# Patient Record
Sex: FEMALE | Race: WHITE | NOT HISPANIC OR LATINO | Employment: OTHER | URBAN - METROPOLITAN AREA
[De-identification: names, ages, dates, MRNs, and addresses within clinical notes are randomized per-mention and may not be internally consistent; named-entity substitution may affect disease eponyms.]

---

## 2023-10-18 ENCOUNTER — OFFICE VISIT (OUTPATIENT)
Dept: INTERNAL MEDICINE | Facility: CLINIC | Age: 68
End: 2023-10-18
Payer: MEDICARE

## 2023-10-18 ENCOUNTER — PATIENT ROUNDING (BHMG ONLY) (OUTPATIENT)
Dept: INTERNAL MEDICINE | Facility: CLINIC | Age: 68
End: 2023-10-18
Payer: MEDICARE

## 2023-10-18 ENCOUNTER — TELEPHONE (OUTPATIENT)
Dept: INTERNAL MEDICINE | Facility: CLINIC | Age: 68
End: 2023-10-18

## 2023-10-18 VITALS
TEMPERATURE: 97.2 F | HEIGHT: 67 IN | WEIGHT: 196 LBS | HEART RATE: 97 BPM | BODY MASS INDEX: 30.76 KG/M2 | SYSTOLIC BLOOD PRESSURE: 139 MMHG | OXYGEN SATURATION: 98 % | DIASTOLIC BLOOD PRESSURE: 81 MMHG

## 2023-10-18 DIAGNOSIS — I25.10 CORONARY ARTERY DISEASE INVOLVING NATIVE CORONARY ARTERY OF NATIVE HEART WITHOUT ANGINA PECTORIS: ICD-10-CM

## 2023-10-18 DIAGNOSIS — Z76.89 ESTABLISHING CARE WITH NEW DOCTOR, ENCOUNTER FOR: Primary | ICD-10-CM

## 2023-10-18 DIAGNOSIS — K08.109 EDENTULOUS: ICD-10-CM

## 2023-10-18 DIAGNOSIS — E78.5 HYPERLIPIDEMIA, UNSPECIFIED HYPERLIPIDEMIA TYPE: ICD-10-CM

## 2023-10-18 DIAGNOSIS — Z13.29 SCREENING FOR THYROID DISORDER: ICD-10-CM

## 2023-10-18 DIAGNOSIS — E66.09 CLASS 1 OBESITY DUE TO EXCESS CALORIES WITHOUT SERIOUS COMORBIDITY WITH BODY MASS INDEX (BMI) OF 30.0 TO 30.9 IN ADULT: ICD-10-CM

## 2023-10-18 DIAGNOSIS — G25.81 RESTLESS LEG: ICD-10-CM

## 2023-10-18 DIAGNOSIS — I43 CARDIOMYOPATHY IN DISEASES CLASSIFIED ELSEWHERE: ICD-10-CM

## 2023-10-18 DIAGNOSIS — I10 ESSENTIAL HYPERTENSION: ICD-10-CM

## 2023-10-18 DIAGNOSIS — R39.15 URINARY URGENCY: ICD-10-CM

## 2023-10-18 DIAGNOSIS — Z23 ENCOUNTER FOR IMMUNIZATION: ICD-10-CM

## 2023-10-18 DIAGNOSIS — I50.22 CHRONIC SYSTOLIC CONGESTIVE HEART FAILURE: ICD-10-CM

## 2023-10-18 DIAGNOSIS — Z11.59 NEED FOR HEPATITIS C SCREENING TEST: ICD-10-CM

## 2023-10-18 DIAGNOSIS — Z86.73 HISTORY OF CVA (CEREBROVASCULAR ACCIDENT): ICD-10-CM

## 2023-10-18 DIAGNOSIS — E11.65 TYPE 2 DIABETES MELLITUS WITH HYPERGLYCEMIA, UNSPECIFIED WHETHER LONG TERM INSULIN USE: ICD-10-CM

## 2023-10-18 DIAGNOSIS — K58.0 IRRITABLE BOWEL SYNDROME WITH DIARRHEA: ICD-10-CM

## 2023-10-18 LAB
ALBUMIN SERPL-MCNC: 4 G/DL (ref 3.5–5.2)
ALBUMIN UR-MCNC: 12.5 MG/DL
ALBUMIN/GLOB SERPL: 1.3 G/DL
ALP SERPL-CCNC: 59 U/L (ref 39–117)
ALT SERPL W P-5'-P-CCNC: 23 U/L (ref 1–33)
ANION GAP SERPL CALCULATED.3IONS-SCNC: 12.3 MMOL/L (ref 5–15)
AST SERPL-CCNC: 29 U/L (ref 1–32)
BILIRUB BLD-MCNC: NEGATIVE MG/DL
BILIRUB SERPL-MCNC: 0.3 MG/DL (ref 0–1.2)
BUN SERPL-MCNC: 29 MG/DL (ref 8–23)
BUN/CREAT SERPL: 18.5 (ref 7–25)
CALCIUM SPEC-SCNC: 9.4 MG/DL (ref 8.6–10.5)
CHLORIDE SERPL-SCNC: 107 MMOL/L (ref 98–107)
CHOLEST SERPL-MCNC: 212 MG/DL (ref 0–200)
CLARITY, POC: CLEAR
CO2 SERPL-SCNC: 18.7 MMOL/L (ref 22–29)
COLOR UR: YELLOW
CREAT SERPL-MCNC: 1.57 MG/DL (ref 0.57–1)
CREAT UR-MCNC: 37.5 MG/DL
EGFRCR SERPLBLD CKD-EPI 2021: 35.3 ML/MIN/1.73
EXPIRATION DATE: NORMAL
GLOBULIN UR ELPH-MCNC: 3 GM/DL
GLUCOSE SERPL-MCNC: 90 MG/DL (ref 65–99)
GLUCOSE UR STRIP-MCNC: NEGATIVE MG/DL
HBA1C MFR BLD: 5.6 % (ref 4.8–5.6)
HCV AB SER DONR QL: REACTIVE
HDLC SERPL-MCNC: 52 MG/DL (ref 40–60)
KETONES UR QL: NEGATIVE
LDLC SERPL CALC-MCNC: 136 MG/DL (ref 0–100)
LDLC/HDLC SERPL: 2.57 {RATIO}
LEUKOCYTE EST, POC: NEGATIVE
Lab: NORMAL
MICROALBUMIN/CREAT UR: 333.3 MG/G (ref 0–29)
NITRITE UR-MCNC: NEGATIVE MG/ML
PH UR: 7 [PH] (ref 5–8)
POTASSIUM SERPL-SCNC: 5.4 MMOL/L (ref 3.5–5.2)
PROT SERPL-MCNC: 7 G/DL (ref 6–8.5)
PROT UR STRIP-MCNC: NEGATIVE MG/DL
RBC # UR STRIP: NEGATIVE /UL
SODIUM SERPL-SCNC: 138 MMOL/L (ref 136–145)
SP GR UR: 1.01 (ref 1–1.03)
TRIGL SERPL-MCNC: 132 MG/DL (ref 0–150)
TSH SERPL DL<=0.05 MIU/L-ACNC: 2.31 UIU/ML (ref 0.27–4.2)
UROBILINOGEN UR QL: NORMAL
VLDLC SERPL-MCNC: 24 MG/DL (ref 5–40)

## 2023-10-18 PROCEDURE — 84443 ASSAY THYROID STIM HORMONE: CPT | Performed by: STUDENT IN AN ORGANIZED HEALTH CARE EDUCATION/TRAINING PROGRAM

## 2023-10-18 PROCEDURE — 83036 HEMOGLOBIN GLYCOSYLATED A1C: CPT | Performed by: STUDENT IN AN ORGANIZED HEALTH CARE EDUCATION/TRAINING PROGRAM

## 2023-10-18 PROCEDURE — 80061 LIPID PANEL: CPT | Performed by: STUDENT IN AN ORGANIZED HEALTH CARE EDUCATION/TRAINING PROGRAM

## 2023-10-18 PROCEDURE — 80053 COMPREHEN METABOLIC PANEL: CPT | Performed by: STUDENT IN AN ORGANIZED HEALTH CARE EDUCATION/TRAINING PROGRAM

## 2023-10-18 PROCEDURE — 82043 UR ALBUMIN QUANTITATIVE: CPT | Performed by: STUDENT IN AN ORGANIZED HEALTH CARE EDUCATION/TRAINING PROGRAM

## 2023-10-18 PROCEDURE — 86803 HEPATITIS C AB TEST: CPT | Performed by: STUDENT IN AN ORGANIZED HEALTH CARE EDUCATION/TRAINING PROGRAM

## 2023-10-18 PROCEDURE — 82570 ASSAY OF URINE CREATININE: CPT | Performed by: STUDENT IN AN ORGANIZED HEALTH CARE EDUCATION/TRAINING PROGRAM

## 2023-10-18 RX ORDER — DICYCLOMINE HYDROCHLORIDE 10 MG/1
10 CAPSULE ORAL
Qty: 120 CAPSULE | Refills: 2 | Status: SHIPPED | OUTPATIENT
Start: 2023-10-18

## 2023-10-18 RX ORDER — ROPINIROLE 1 MG/1
1 TABLET, FILM COATED ORAL 3 TIMES DAILY
Qty: 270 TABLET | Refills: 2 | Status: SHIPPED | OUTPATIENT
Start: 2023-10-18

## 2023-10-18 RX ORDER — ALLOPURINOL 300 MG/1
300 TABLET ORAL DAILY
COMMUNITY

## 2023-10-18 RX ORDER — ACYCLOVIR 200 MG/1
200 CAPSULE ORAL
Status: CANCELLED | OUTPATIENT
Start: 2023-10-18

## 2023-10-18 RX ORDER — ALLOPURINOL 300 MG/1
300 TABLET ORAL DAILY
Status: CANCELLED | OUTPATIENT
Start: 2023-10-18

## 2023-10-18 RX ORDER — ROSUVASTATIN CALCIUM 20 MG/1
20 TABLET, COATED ORAL DAILY
Qty: 90 TABLET | Refills: 2 | Status: SHIPPED | OUTPATIENT
Start: 2023-10-18

## 2023-10-18 RX ORDER — METOPROLOL SUCCINATE 50 MG/1
50 TABLET, EXTENDED RELEASE ORAL DAILY
Qty: 90 TABLET | Refills: 2 | Status: SHIPPED | OUTPATIENT
Start: 2023-10-18

## 2023-10-18 RX ORDER — DAPAGLIFLOZIN 10 MG/1
10 TABLET, FILM COATED ORAL DAILY
Qty: 90 TABLET | Refills: 2 | Status: SHIPPED | OUTPATIENT
Start: 2023-10-18

## 2023-10-18 RX ORDER — TRAZODONE HYDROCHLORIDE 100 MG/1
100 TABLET ORAL NIGHTLY
COMMUNITY

## 2023-10-18 RX ORDER — SPIRONOLACTONE 25 MG/1
25 TABLET ORAL DAILY
Qty: 90 TABLET | Refills: 2 | Status: SHIPPED | OUTPATIENT
Start: 2023-10-18

## 2023-10-18 RX ORDER — ACYCLOVIR 200 MG/1
200 CAPSULE ORAL
COMMUNITY

## 2023-10-18 RX ORDER — FUROSEMIDE 40 MG/1
40 TABLET ORAL DAILY
Qty: 90 TABLET | Refills: 2 | Status: SHIPPED | OUTPATIENT
Start: 2023-10-18 | End: 2024-01-16

## 2023-10-18 RX ORDER — ROPINIROLE 1 MG/1
1 TABLET, FILM COATED ORAL 3 TIMES DAILY
Qty: 270 TABLET | Refills: 2 | Status: SHIPPED | OUTPATIENT
Start: 2023-10-18 | End: 2023-10-18 | Stop reason: SDUPTHER

## 2023-10-18 NOTE — TELEPHONE ENCOUNTER
CALLED SPOKE TO PATIENT, SHE WILL COME IN & SIGN THE RECORDS RELEASE FORMS SO WE CAN FAX THE 4 REQUESTS.

## 2023-10-18 NOTE — LETTER
[facility]  Vaccine Consent Form    Patient Name:  Stefania Figueroa  Patient :  1955     Vaccine(s) Ordered    Pneumococcal Conjugate Vaccine 20-Valent (PCV20)        Screening Checklist  The following questions should be completed prior to vaccination. If you answer “yes” to any question, it does not necessarily mean you should not be vaccinated. It just means we may need to clarify or ask more questions. If a question is unclear, please ask your healthcare provider to explain it.    Yes No   Any fever or moderate to severe illness today (mild illness and/or antibiotic treatment are not contraindications)?     Do you have a history of a serious reaction to any previous vaccinations, such as anaphylaxis, encephalopathy within 7 days, Guillain-Westerlo syndrome within 6 weeks, seizure?     Have you received any live vaccine(s) in the past month (MMR, KWABENA)?     Do you have an anaphylactic allergy to latex (DTaP, DTaP-IPV, Hep A, Hep B, MenB, RV, Td, Tdap), baker’s yeast (Hep B, HPV), or gelatin (KWABENA, MMR)?     Do you have an anaphylactic allergy to neomycin (Rabies, KWABENA, MMR, IPV, Hep A), polymyxin B (IPV), or streptomycin (IPV)?      Any cancer, leukemia, AIDS, or other immune system disorder? (KWABENA, MMR, RV)     Do you have a parent, brother, or sister with an immune system problem (if immune competence of vaccine recipient clinically verified, can proceed)? (MMR, KWABENA)     Any recent steroid treatments for >2 weeks, chemotherapy, or radiation treatment? (KWABENA, MMR)     Have you received antibody-containing blood transfusions or IVIG in the past 11 months (recommended interval is dependent on product)? (MMR, KWABENA)     Have you taken antiviral drugs (acyclovir, famciclovir, valacyclovir) in the last 24 or 48 hours, respectively (KWABENA)?      Are you pregnant or planning to become pregnant within 1 month? (KWABENA, MMR, HPV, IPV, MenB; For hep B- refer to Engerix-B)     For infants, have you ever been told your child has  had intussusception or a medical emergency involving obstruction of the intestine (RV)? If not for an infant, can skip this question.         *Ordering Physician/APC should be consulted if “yes” is checked by the patient or guardian above.      I have received, read, and understand the Vaccine Information Statement (VIS) for each vaccine ordered above.  I have considered my health status as well as the health status of my close contacts.  I have taken the opportunity to discuss my vaccine questions with my health care provider.   I have requested that the ordered vaccine(s) be given to me.  I understand the benefits and risks of the vaccines.  I understand that I should remain in the clinic for 15 minutes after receiving the vaccine(s).  _________________________________________________________  Signature of Patient or Parent/Legal Guardian ____________________  Date

## 2023-10-18 NOTE — PROGRESS NOTES
"Chief Complaint  Med Refill (Pt states she might have a week left here she's trying to go back to [place] ), Irritable Bowel Syndrome (Pt thinks she has ibs has really bad gas ), Weight Gain (Pt concerned of weight gain), and Anxiety (States her meds aren't helping /Scared she might have another heart attack or stroke her bp has been running 170/90 )    Subjective          Stefania Figueroa presents to [facility]  History of Present Illness  Previous PCP: negar brink ([place], [place])  Specialists: Quintin Velazquez (cardiology, [facility]); Michael Van (cardiology, [facility])  Covid Vaccine: 2 doses   Shingrix: no  Pneumonia Vaccine: no  TDAP Vaccine: no  Colonoscopy:yes   Pap Smear: years ago   Mammogram: no     Miss Figueroa is a 69 yo F with a history of HFrEF (EF 25-35%), CAD, previous CVA, T2DM who is here to establish care.  Of note, she has been living in [place] for about 6 months.  Prior to that, she's bounced between three other [facility] ([place], [place]; [place], [place]; and [place], [place]).    Reports having had previous echo's showing an EF low of 25%, with a more recent one at 35%.  She reports a history of two Mis.  She has no defibrillator, although this has been discussed.  She has one INOCENTE, and has not had a CABG.    She, of note, also reports two previous CVA's, with residual issues in her left foot (stating that the \"Left foot doesn't get signals correctly\").    She reports three hospitalizations in the two years for heart failure (one of these was for sepsis as well as heart failure).  These hospitalizations were in [place], [place] (in September 2022, December 2022, and March 2023).  She reports that she briefly follows with an Michael Van of Cardiology in [place].  In [place], [place], followed with a Quintin Velazquez MD.    She has been off her lasix and metoprolol 3 days ago.  She reports that her regular weight is 176 lbs (today, she weighed 196 lbs). "  She reports that her edema generally is in her stomach rather than her legs.  She denies dyspnea, and denies chest pain.    She has previously refused statins because she is concerned that it might affect her memory.  I did encourage her to go onto a statin, and she is amenable to starting.    She reports a long standing history of IBS with diarrhea.  She reports having urinary urgency without dysuria, and would like her urine checked for a UTI.    Elevated BP noted by MA above under chief complaint was taken prior to taking her morning meds (with the exception of metoprolol and lasix, that she is out of).    PHQ-9 Depression Screening  Little interest or pleasure in doing things? 0-->not at all   Feeling down, depressed, or hopeless? 0-->not at all   Trouble falling or staying asleep, or sleeping too much?     Feeling tired or having little energy?     Poor appetite or overeating?     Feeling bad about yourself - or that you are a failure or have let yourself or your family down?     Trouble concentrating on things, such as reading the newspaper or watching television?     Moving or speaking so slowly that other people could have noticed? Or the opposite - being so fidgety or restless that you have been moving around a lot more than usual?     Thoughts that you would be better off dead, or of hurting yourself in some way?     PHQ-9 Total Score 0   If you checked off any problems, how difficult have these problems made it for you to do your work, take care of things at home, or get along with other people?           Current Outpatient Medications   Medication Instructions    acyclovir (ZOVIRAX) 200 mg, Oral, Every 4 Hours While Awake    allopurinol (ZYLOPRIM) 300 mg, Oral, Daily    dicyclomine (BENTYL) 10 mg, Oral, 4 Times Daily Before Meals & Nightly PRN    Farxiga 10 mg, Oral, Daily    furosemide (LASIX) 40 mg, Oral, Daily    metoprolol succinate XL (TOPROL XL) 50 mg, Oral, Daily    rOPINIRole (REQUIP) 1 mg,  "Oral, 3 Times Daily, Take 1 hour before bedtime.    rosuvastatin (CRESTOR) 20 mg, Oral, Daily    spironolactone (ALDACTONE) 25 mg, Oral, Daily    traZODone (DESYREL) 100 mg, Oral, Nightly       The following portions of the patient's history were reviewed and updated as appropriate: allergies, current medications, past family history, past medical history, past social history, past surgical history, and problem list.    Objective   Vital Signs:   /81 (BP Location: Left arm, Patient Position: Sitting, Cuff Size: Large Adult)   Pulse 97   Temp 97.2 °F (36.2 °C) (Temporal)   Ht 170.2 cm (67\")   Wt 88.9 kg (196 lb)   SpO2 98%   BMI 30.70 kg/m²     BP Readings from Last 3 Encounters:   10/18/23 139/81   09/25/23 128/65     Wt Readings from Last 3 Encounters:   10/18/23 88.9 kg (196 lb)   09/25/23 85 kg (187 lb 6.3 oz)     BMI is >= 25 and <30. (Overweight) The following options were offered after discussion;: exercise counseling/recommendations and nutrition counseling/recommendations    Physical Exam  Vitals reviewed.   Constitutional:       General: She is not in acute distress.     Appearance: Normal appearance. She is not ill-appearing, toxic-appearing or diaphoretic.   HENT:      Head: Normocephalic and atraumatic.      Right Ear: Tympanic membrane, ear canal and external ear normal.      Left Ear: Tympanic membrane, ear canal and external ear normal.      Mouth/Throat:      Mouth: Mucous membranes are moist.      Pharynx: Oropharynx is clear.      Comments: Wears dentures  Eyes:      Conjunctiva/sclera: Conjunctivae normal.   Cardiovascular:      Rate and Rhythm: Normal rate and regular rhythm.      Pulses: Normal pulses.      Heart sounds: Normal heart sounds. No murmur heard.     No friction rub. No gallop.   Pulmonary:      Effort: Pulmonary effort is normal. No respiratory distress.      Breath sounds: Normal breath sounds. No stridor. No wheezing, rhonchi or rales.   Chest:      Chest wall: No " tenderness.   Abdominal:      General: Abdomen is flat.      Palpations: Abdomen is soft. There is no mass.      Tenderness: There is no abdominal tenderness. There is no guarding or rebound.   Musculoskeletal:      Right lower leg: No edema.      Left lower leg: No edema.   Skin:     General: Skin is warm and dry.   Neurological:      Mental Status: She is alert. Mental status is at baseline.   Psychiatric:         Behavior: Behavior normal.         Thought Content: Thought content normal.         Judgment: Judgment normal.        Result Review :   The following data was reviewed by: Parker Fontana MD on 10/18/2023:           Lab Results   Component Value Date    BILIRUBINUR Negative 10/18/2023       Procedures   POC UA is negative LE, negative nitrites.  UA unremarkable     Assessment and Plan    Diagnoses and all orders for this visit:    1. Establishing care with new doctor, encounter for (Primary)    2. Essential hypertension  -     metoprolol succinate XL (Toprol XL) 50 MG 24 hr tablet; Take 1 tablet by mouth Daily.  Dispense: 90 tablet; Refill: 2  -     spironolactone (ALDACTONE) 25 MG tablet; Take 1 tablet by mouth Daily.  Dispense: 90 tablet; Refill: 2  -     Ambulatory Referral to Cardiology    3. Restless leg  -     Discontinue: rOPINIRole (REQUIP) 1 MG tablet; Take 1 tablet by mouth 3 (Three) Times a Day for 14 days. Take 1 hour before bedtime.  Dispense: 270 tablet; Refill: 2  -     rOPINIRole (REQUIP) 1 MG tablet; Take 1 tablet by mouth 3 (Three) Times a Day. Take 1 hour before bedtime.  Dispense: 270 tablet; Refill: 2    4. Class 1 obesity due to excess calories without serious comorbidity with body mass index (BMI) of 30.0 to 30.9 in adult    5. Chronic systolic congestive heart failure  -     dapagliflozin Propanediol (Farxiga) 10 MG tablet; Take 10 mg by mouth Daily.  Dispense: 90 tablet; Refill: 2  -     furosemide (LASIX) 40 MG tablet; Take 1 tablet by mouth Daily for 90 days.  Dispense: 90  tablet; Refill: 2  -     metoprolol succinate XL (Toprol XL) 50 MG 24 hr tablet; Take 1 tablet by mouth Daily.  Dispense: 90 tablet; Refill: 2  -     spironolactone (ALDACTONE) 25 MG tablet; Take 1 tablet by mouth Daily.  Dispense: 90 tablet; Refill: 2  -     Ambulatory Referral to Cardiology  -     Adult Transthoracic Echo Complete W/ Cont if Necessary Per Protocol; Future  -     Comprehensive Metabolic Panel  -     Hemoglobin A1c    6. History of CVA (cerebrovascular accident)  -     Lipid Panel  -     rosuvastatin (Crestor) 20 MG tablet; Take 1 tablet by mouth Daily.  Dispense: 90 tablet; Refill: 2    7. Type 2 diabetes mellitus with hyperglycemia, unspecified whether long term insulin use  -     dapagliflozin Propanediol (Farxiga) 10 MG tablet; Take 10 mg by mouth Daily.  Dispense: 90 tablet; Refill: 2  -     Comprehensive Metabolic Panel  -     Hemoglobin A1c  -     Microalbumin / Creatinine Urine Ratio - Urine, Clean Catch    8. Screening for thyroid disorder  -     TSH    9. Hyperlipidemia, unspecified hyperlipidemia type  -     Lipid Panel  -     rosuvastatin (Crestor) 20 MG tablet; Take 1 tablet by mouth Daily.  Dispense: 90 tablet; Refill: 2    10. Need for hepatitis C screening test  -     Hepatitis C Antibody    11. Encounter for immunization  -     Pneumococcal Conjugate Vaccine 20-Valent (PCV20)    12. Cardiomyopathy in diseases classified elsewhere  -     Adult Transthoracic Echo Complete W/ Cont if Necessary Per Protocol; Future    13. Edentulous    14. Irritable bowel syndrome with diarrhea  -     dicyclomine (BENTYL) 10 MG capsule; Take 1 capsule by mouth 4 (Four) Times a Day Before Meals & at Bedtime As Needed for Abdominal Cramping.  Dispense: 120 capsule; Refill: 2    15. Coronary artery disease involving native coronary artery of native heart without angina pectoris  -     rosuvastatin (Crestor) 20 MG tablet; Take 1 tablet by mouth Daily.  Dispense: 90 tablet; Refill: 2    16. Urinary  urgency  -     POC Urinalysis Dipstick, Automated  -     Urine Culture - Urine, Urine, Clean Catch  -     Urinalysis With Microscopic - Urine, Clean Catch      CHF:  -have ordered repeat Echo and placed cardiology referral    Misc:  -I did  Miss Figueroa that I am quite concerned that her frequent travel between states is resulting in her being lost to care  -I did speak with her at length that when she moves, she really should do so in as organized a manner as possible.  This would include efforts to find and establish with a new provider as soon as possible (Miss Figueroa reports having been living here for 6 months, and only established when she was in danger of running out of chronic medications).    Immunization:  -Discussed risks/benefits to vaccination, reviewed components of the vaccine, discussed VIS, discussed informed consent, informed consent obtained. Patient/Parent was allowed to accept or refuse vaccine. Questions answered to satisfactory state of patient/parent. We reviewed typical age appropriate and seasonally appropriate vaccinations. Reviewed immunization history and updated state vaccination form as needed. Patient/Parent was counseled on the above vaccines.        Medications Discontinued During This Encounter   Medication Reason    dapagliflozin Propanediol (Farxiga) 10 MG tablet Reorder    metoprolol succinate XL (Toprol XL) 50 MG 24 hr tablet Reorder    spironolactone (ALDACTONE) 25 MG tablet Reorder    furosemide (LASIX) 40 MG tablet Reorder    rOPINIRole (REQUIP) 1 MG tablet Reorder    rOPINIRole (REQUIP) 1 MG tablet Reorder          Follow Up   Return in about 1 month (around 11/18/2023) for Medicare Wellness.  Patient was given instructions and counseling regarding her condition or for health maintenance advice. Please see specific information pulled into the AVS if appropriate.       Parker Fontana MD  10/18/23  12:12 EDT

## 2023-10-18 NOTE — Clinical Note
Please records:  [facility], in [place] Michael Van of cardiology, [facility] Kurt Gonzales, previous PCP in [place] Quintin Velazquez, Cardiology, [facility]

## 2023-10-19 ENCOUNTER — LAB (OUTPATIENT)
Dept: LAB | Facility: HOSPITAL | Age: 68
End: 2023-10-19
Payer: MEDICARE

## 2023-10-19 DIAGNOSIS — R76.8 POSITIVE HEPATITIS C ANTIBODY TEST: Primary | ICD-10-CM

## 2023-10-19 DIAGNOSIS — B19.20 HEPATITIS C VIRUS INFECTION WITHOUT HEPATIC COMA, UNSPECIFIED CHRONICITY: ICD-10-CM

## 2023-10-19 DIAGNOSIS — N18.32 STAGE 3B CHRONIC KIDNEY DISEASE: ICD-10-CM

## 2023-10-19 DIAGNOSIS — R76.8 POSITIVE HEPATITIS C ANTIBODY TEST: ICD-10-CM

## 2023-10-19 PROBLEM — Z53.20 REFUSAL OF STATIN MEDICATION BY PATIENT: Status: ACTIVE | Noted: 2023-10-19

## 2023-10-19 LAB
ALBUMIN SERPL-MCNC: 4.2 G/DL (ref 3.5–5.2)
ALBUMIN/GLOB SERPL: 1.4 G/DL
ALP SERPL-CCNC: 61 U/L (ref 39–117)
ALT SERPL W P-5'-P-CCNC: 20 U/L (ref 1–33)
ANION GAP SERPL CALCULATED.3IONS-SCNC: 8.7 MMOL/L (ref 5–15)
AST SERPL-CCNC: 24 U/L (ref 1–32)
BILIRUB SERPL-MCNC: 0.3 MG/DL (ref 0–1.2)
BUN SERPL-MCNC: 28 MG/DL (ref 8–23)
BUN/CREAT SERPL: 16.1 (ref 7–25)
CALCIUM SPEC-SCNC: 9.5 MG/DL (ref 8.6–10.5)
CHLORIDE SERPL-SCNC: 106 MMOL/L (ref 98–107)
CO2 SERPL-SCNC: 23.3 MMOL/L (ref 22–29)
CREAT SERPL-MCNC: 1.74 MG/DL (ref 0.57–1)
EGFRCR SERPLBLD CKD-EPI 2021: 31.2 ML/MIN/1.73
GLOBULIN UR ELPH-MCNC: 2.9 GM/DL
GLUCOSE SERPL-MCNC: 83 MG/DL (ref 65–99)
INR PPP: 0.92 (ref 0.86–1.15)
POTASSIUM SERPL-SCNC: 5.3 MMOL/L (ref 3.5–5.2)
PROT SERPL-MCNC: 7.1 G/DL (ref 6–8.5)
PROTHROMBIN TIME: 12.5 SECONDS (ref 11.8–14.9)
SODIUM SERPL-SCNC: 138 MMOL/L (ref 136–145)

## 2023-10-19 PROCEDURE — 87517 HEPATITIS B DNA QUANT: CPT

## 2023-10-19 PROCEDURE — 85610 PROTHROMBIN TIME: CPT

## 2023-10-19 PROCEDURE — 87522 HEPATITIS C REVRS TRNSCRPJ: CPT

## 2023-10-19 PROCEDURE — 36415 COLL VENOUS BLD VENIPUNCTURE: CPT

## 2023-10-19 PROCEDURE — 80053 COMPREHEN METABOLIC PANEL: CPT

## 2023-10-21 LAB
HBV DNA SERPL NAA+PROBE-ACNC: NORMAL IU/ML
HBV DNA SERPL NAA+PROBE-LOG IU: NORMAL LOG10 IU/ML
HBV GENTYP SERPL NAA+PROBE: NORMAL
REF LAB TEST REF RANGE: NORMAL

## 2023-10-23 LAB
HCV RNA SERPL NAA+PROBE-ACNC: NORMAL IU/ML
TEST INFORMATION: NORMAL

## 2023-10-26 ENCOUNTER — TELEPHONE (OUTPATIENT)
Dept: INTERNAL MEDICINE | Facility: CLINIC | Age: 68
End: 2023-10-26
Payer: MEDICARE

## 2023-10-26 NOTE — TELEPHONE ENCOUNTER
Name: DUKE Stefania    Relationship: Self    Best Callback Number: [phone]    HUB PROVIDED THE RELAY MESSAGE FROM THE OFFICE   PATIENT VOICED UNDERSTANDING AND HAS NO FURTHER QUESTIONS AT THIS TIME    ADDITIONAL INFORMATION:

## 2023-10-26 NOTE — TELEPHONE ENCOUNTER
Attempted to contact patient regarding lab results. Left Voicemail to call our office back.     HUB OK TO READ/ ADVISE:       ----- Message from Parker Fontana MD sent at 10/26/2023  8:18 AM EDT -----  PCR testing for Hepatitis C is negative.  There is no evidence of hepatitis C infection.

## 2023-10-26 NOTE — TELEPHONE ENCOUNTER
----- Message from Parker Fontana MD sent at 10/26/2023  8:18 AM EDT -----  PCR testing for Hepatitis C is negative.  There is no evidence of hepatitis C infection.

## 2023-10-30 ENCOUNTER — TELEPHONE (OUTPATIENT)
Dept: INTERNAL MEDICINE | Facility: CLINIC | Age: 68
End: 2023-10-30
Payer: MEDICARE

## 2023-10-30 DIAGNOSIS — I10 ESSENTIAL HYPERTENSION: ICD-10-CM

## 2023-10-30 DIAGNOSIS — I50.22 CHRONIC SYSTOLIC CONGESTIVE HEART FAILURE: ICD-10-CM

## 2023-10-30 DIAGNOSIS — G25.81 RESTLESS LEG: ICD-10-CM

## 2023-10-30 DIAGNOSIS — E78.5 HYPERLIPIDEMIA, UNSPECIFIED HYPERLIPIDEMIA TYPE: ICD-10-CM

## 2023-10-30 DIAGNOSIS — Z86.73 HISTORY OF CVA (CEREBROVASCULAR ACCIDENT): ICD-10-CM

## 2023-10-30 DIAGNOSIS — K58.0 IRRITABLE BOWEL SYNDROME WITH DIARRHEA: ICD-10-CM

## 2023-10-30 DIAGNOSIS — E11.65 TYPE 2 DIABETES MELLITUS WITH HYPERGLYCEMIA, UNSPECIFIED WHETHER LONG TERM INSULIN USE: ICD-10-CM

## 2023-10-30 DIAGNOSIS — I25.10 CORONARY ARTERY DISEASE INVOLVING NATIVE CORONARY ARTERY OF NATIVE HEART WITHOUT ANGINA PECTORIS: ICD-10-CM

## 2023-10-30 NOTE — TELEPHONE ENCOUNTER
Caller: Stefania DUKE    Relationship to patient: Self    Best call back number: [phone]    Patient is needing: PATIENT CALLED STATING ALL HER MEDICATION THAT WAS SENT ON 10.18.23 WAS SENT TO THE WRONG PHARMACY AND PATIENT IS REQUESTING THE MEDICATION BE RESUBMITTED TO [facility] IN [place] INSTEAD. PLEASE CALL PATIENT ONCE COMPLETED.

## 2023-11-01 RX ORDER — SPIRONOLACTONE 25 MG/1
25 TABLET ORAL DAILY
Qty: 90 TABLET | Refills: 2 | Status: SHIPPED | OUTPATIENT
Start: 2023-11-01

## 2023-11-01 RX ORDER — DAPAGLIFLOZIN 10 MG/1
10 TABLET, FILM COATED ORAL DAILY
Qty: 90 TABLET | Refills: 2 | Status: SHIPPED | OUTPATIENT
Start: 2023-11-01 | End: 2023-11-01 | Stop reason: SDUPTHER

## 2023-11-01 RX ORDER — ROPINIROLE 1 MG/1
1 TABLET, FILM COATED ORAL 3 TIMES DAILY
Qty: 270 TABLET | Refills: 2 | Status: SHIPPED | OUTPATIENT
Start: 2023-11-01

## 2023-11-01 RX ORDER — FUROSEMIDE 40 MG/1
40 TABLET ORAL DAILY
Qty: 30 TABLET | Refills: 2 | Status: SHIPPED | OUTPATIENT
Start: 2023-11-01 | End: 2023-11-01 | Stop reason: SDUPTHER

## 2023-11-01 RX ORDER — DAPAGLIFLOZIN 10 MG/1
10 TABLET, FILM COATED ORAL DAILY
Qty: 90 TABLET | Refills: 2 | Status: SHIPPED | OUTPATIENT
Start: 2023-11-01

## 2023-11-01 RX ORDER — FUROSEMIDE 40 MG/1
40 TABLET ORAL DAILY
Qty: 30 TABLET | Refills: 2 | Status: SHIPPED | OUTPATIENT
Start: 2023-11-01 | End: 2024-01-30

## 2023-11-01 RX ORDER — ACYCLOVIR 200 MG/1
200 CAPSULE ORAL
Status: CANCELLED | OUTPATIENT
Start: 2023-11-01

## 2023-11-01 RX ORDER — ROPINIROLE 1 MG/1
1 TABLET, FILM COATED ORAL 3 TIMES DAILY
Qty: 270 TABLET | Refills: 2 | Status: SHIPPED | OUTPATIENT
Start: 2023-11-01 | End: 2023-11-01 | Stop reason: SDUPTHER

## 2023-11-01 RX ORDER — ROSUVASTATIN CALCIUM 20 MG/1
20 TABLET, COATED ORAL DAILY
Qty: 90 TABLET | Refills: 2 | Status: SHIPPED | OUTPATIENT
Start: 2023-11-01

## 2023-11-01 RX ORDER — ROSUVASTATIN CALCIUM 20 MG/1
20 TABLET, COATED ORAL DAILY
Qty: 90 TABLET | Refills: 2 | Status: SHIPPED | OUTPATIENT
Start: 2023-11-01 | End: 2023-11-01 | Stop reason: SDUPTHER

## 2023-11-01 RX ORDER — DICYCLOMINE HYDROCHLORIDE 10 MG/1
10 CAPSULE ORAL
Qty: 120 CAPSULE | Refills: 2 | Status: SHIPPED | OUTPATIENT
Start: 2023-11-01 | End: 2023-11-01 | Stop reason: SDUPTHER

## 2023-11-01 RX ORDER — METOPROLOL SUCCINATE 50 MG/1
50 TABLET, EXTENDED RELEASE ORAL DAILY
Qty: 90 TABLET | Refills: 2 | Status: SHIPPED | OUTPATIENT
Start: 2023-11-01

## 2023-11-01 RX ORDER — DICYCLOMINE HYDROCHLORIDE 10 MG/1
10 CAPSULE ORAL
Qty: 120 CAPSULE | Refills: 2 | Status: SHIPPED | OUTPATIENT
Start: 2023-11-01

## 2023-11-01 RX ORDER — METOPROLOL SUCCINATE 50 MG/1
50 TABLET, EXTENDED RELEASE ORAL DAILY
Qty: 90 TABLET | Refills: 2 | Status: SHIPPED | OUTPATIENT
Start: 2023-11-01 | End: 2023-11-01 | Stop reason: SDUPTHER

## 2023-11-01 RX ORDER — SPIRONOLACTONE 25 MG/1
25 TABLET ORAL DAILY
Qty: 90 TABLET | Refills: 2 | Status: SHIPPED | OUTPATIENT
Start: 2023-11-01 | End: 2023-11-01 | Stop reason: SDUPTHER

## 2023-11-01 NOTE — TELEPHONE ENCOUNTER
Caller: Stefania DUKE    Relationship: Self    Best call back number: [phone]     Requested Prescriptions:   Requested Prescriptions     Pending Prescriptions Disp Refills    allopurinol (ZYLOPRIM) 300 MG tablet       Sig: Take 1 tablet by mouth Daily.    acyclovir (ZOVIRAX) 200 MG capsule       Sig: Take 1 capsule by mouth Every 4 (Four) Hours While Awake.        Pharmacy where request should be sent: [facility]/PHARMACY #[id number] - [place], [place] - [address] [facility] [phone] [facility] [phone] FX     Last office visit with prescribing clinician: 10/18/2023   Last telemedicine visit with prescribing clinician: Visit date not found   Next office visit with prescribing clinician: 11/22/2023     Additional details provided by patient: PATIENT IS NEEDING A REFILL.    Does the patient have less than a 3 day supply:  [x] Yes  [] No    Would you like a call back once the refill request has been completed: [x] Yes [] No    If the office needs to give you a call back, can they leave a voicemail: [x] Yes [] No    Natanael Kong Rep   11/01/23 16:09 EDT

## 2023-11-02 RX ORDER — ALLOPURINOL 300 MG/1
300 TABLET ORAL DAILY
Qty: 90 TABLET | Refills: 2 | Status: SHIPPED | OUTPATIENT
Start: 2023-11-02

## 2023-11-02 NOTE — TELEPHONE ENCOUNTER
I don't believe we discussed either issue in clinic, and I don't have her other provider's notes for review.  I'll continue the allopurinol, but I'll hold off on refilling acyclovir for now.

## 2023-11-03 RX ORDER — ACYCLOVIR 200 MG/1
200 CAPSULE ORAL
OUTPATIENT
Start: 2023-11-03

## 2023-11-03 NOTE — TELEPHONE ENCOUNTER
Caller: Stefania DUKE    Relationship: Self    Best call back number: [phone]     Requested Prescriptions:   Requested Prescriptions     Pending Prescriptions Disp Refills    acyclovir (ZOVIRAX) 200 MG capsule       Sig: Take 1 capsule by mouth Every 4 (Four) Hours While Awake.      [facility]     Pharmacy where request should be sent: [facility]/PHARMACY #[id number] - [place], [place] - [address] [facility] [phone]  - [phone] FX     Last office visit with prescribing clinician: 10/18/2023   Last telemedicine visit with prescribing clinician: Visit date not found   Next office visit with prescribing clinician: 11/22/2023     Does the patient have less than a 3 day supply:  [x] Yes  [] No    Would you like a call back once the refill request has been completed: [x] Yes [] No    If the office needs to give you a call back, can they leave a voicemail: [x] Yes [] No    Natanael Riggs Rep   11/03/23 10:12 EDT

## 2023-11-03 NOTE — TELEPHONE ENCOUNTER
"This is a repeat request.    \"  I don't believe we discussed either issue in clinic, and I don't have her other provider's notes for review.  I'll continue the allopurinol, but I'll hold off on refilling acyclovir for now.      \"  "

## 2023-11-03 NOTE — TELEPHONE ENCOUNTER
It does not appear that the Acyclovir or the Myrbetriq has been sent in by you prior. Are these medications okay to send?

## 2023-11-06 ENCOUNTER — TELEPHONE (OUTPATIENT)
Dept: INTERNAL MEDICINE | Facility: CLINIC | Age: 68
End: 2023-11-06
Payer: MEDICARE

## 2023-11-06 DIAGNOSIS — N32.81 OVERACTIVE BLADDER: Primary | ICD-10-CM

## 2023-11-06 NOTE — PROGRESS NOTES
".\"A [employer] message has been sent to the patient for PATIENT ROUNDING with [facility], [place]\"  "

## 2023-11-06 NOTE — TELEPHONE ENCOUNTER
Patient called in upset and stated she did not understand why her Acyclovir was denied. I explained your note to patients prior refill request and advised she had not discussed this with you in re guard to needing it refilled. Patient stated she is having a viral breakout. I asked the patient if she would like me to schedule her an appointment to come in and talk you about it and she stated that she did not have a car. Asking if we can send it in or do a video visit.

## 2023-11-09 RX ORDER — ACYCLOVIR 400 MG/1
400 TABLET ORAL 3 TIMES DAILY
Qty: 30 TABLET | Refills: 0 | Status: SHIPPED | OUTPATIENT
Start: 2023-11-09 | End: 2023-11-19

## 2023-11-09 NOTE — TELEPHONE ENCOUNTER
Patient informed that medication has been sent in. Patient asked if the Myrbetriq had been sent in, informed patient that this was not on active med list. Patient states that she takes this due to having her issues with bladder due to stroke. She has not taken this for a while but is stating that she needs this.    Patient requests that [facility] be sent to [facility].

## 2023-11-16 ENCOUNTER — OFFICE VISIT (OUTPATIENT)
Dept: CARDIOLOGY | Facility: CLINIC | Age: 68
End: 2023-11-16
Payer: MEDICARE

## 2023-11-16 VITALS
BODY MASS INDEX: 30.29 KG/M2 | DIASTOLIC BLOOD PRESSURE: 70 MMHG | HEART RATE: 76 BPM | SYSTOLIC BLOOD PRESSURE: 142 MMHG | HEIGHT: 67 IN | WEIGHT: 193 LBS

## 2023-11-16 DIAGNOSIS — I50.22 SYSTOLIC CHF, CHRONIC: Primary | ICD-10-CM

## 2023-11-16 DIAGNOSIS — E78.2 HYPERLIPEMIA, MIXED: ICD-10-CM

## 2023-11-16 DIAGNOSIS — I42.0 NONISCHEMIC DILATED CARDIOMYOPATHY: ICD-10-CM

## 2023-11-16 PROCEDURE — 99204 OFFICE O/P NEW MOD 45 MIN: CPT | Performed by: SPECIALIST

## 2023-11-16 PROCEDURE — 93000 ELECTROCARDIOGRAM COMPLETE: CPT | Performed by: SPECIALIST

## 2023-11-16 PROCEDURE — 3077F SYST BP >= 140 MM HG: CPT | Performed by: SPECIALIST

## 2023-11-16 PROCEDURE — 3078F DIAST BP <80 MM HG: CPT | Performed by: SPECIALIST

## 2023-11-16 RX ORDER — ASPIRIN 81 MG/1
81 TABLET ORAL DAILY
Qty: 90 TABLET | Refills: 5 | Status: SHIPPED | OUTPATIENT
Start: 2023-11-16

## 2023-11-16 NOTE — PROGRESS NOTES
[facility]   Cardiology Consult Note    Patient Name: Stefania DUKE  : 1955  Referring Physician: Parker Fontana MD  Subjective   Subjective     Reason for Consult/ Chief Complaint:   Chief Complaint   Patient presents with    Hypertension    Congestive Heart Failure       HPI:  Stefania DUKE is a 68 y.o. female with history of cardiomyopathy and CHF.  Has moved in here from [place].  Wants to establish care.  No chest pain.  No shortness of breath.    Review of Systems:    Constitutional no fever,  no weight loss   Skin no rash   Otolaryngeal no difficulty swallowing   Cardiovascular See HPI   Pulmonary no cough, no sputum production   Gastrointestinal no constipation, no diarrhea   Genitourinary no dysuria, no hematuria   Hematologic no easy bruisability, no abnormal bleeding   Musculoskeletal no muscle pain   Neurologic no dizziness, no falls       Personal History     Past Medical History:  Past Medical History:   Diagnosis Date    CHF (congestive heart failure)     Diabetes mellitus     Fluid retention     Gout     Hypertension     Stroke     Tachycardia        Family History: History reviewed. No pertinent family history.    Social History:  reports that she quit smoking about 31 years ago. Her smoking use included cigarettes. She has never used smokeless tobacco. She reports that she does not drink alcohol and does not use drugs.    Home Medications:  Mirabegron ER, acyclovir, allopurinol, dapagliflozin Propanediol, dicyclomine, furosemide, metoprolol succinate XL, rOPINIRole, rosuvastatin, spironolactone, and traZODone    Allergies:  Allergies   Allergen Reactions    Entresto [Sacubitril-Valsartan] Unknown - Low Severity       Objective    Objective     Vitals:   Heart Rate:  [76] 76  BP: (142)/(70) 142/70  Body mass index is 30.23 kg/m².  PHYSICAL EXAM:    General Appearance:   well developed  well nourished  HENT:   oropharynx moist  lips not cyanotic  Neck:  thyroid not  enlarged  supple  Respiratory:  no respiratory distress  normal breath sounds  no rales  Cardiovascular:  no jugular venous distention  regular rhythm  apical impulse normal  S1 normal, S2 normal  no S3, no S4   no murmur  no rub, no thrill  carotid pulses normal; no bruit  pedal pulses normal  lower extremity edema: none    Skin:   warm, dry  Psychiatric:  judgement and insight appropriate  normal mood and affect    RESULTS:           Result Review    Result Review:  I have personally reviewed the available results:  [x]  Laboratory  [x]  EKG/Telemetry   [x]  Cardiology/Vascular   [x] Medications  [x]  Old records  Lab Results   Component Value Date    CHOL 212 (H) 10/18/2023     Lab Results   Component Value Date    TRIG 132 10/18/2023     Lab Results   Component Value Date    HDL 52 10/18/2023     Lab Results   Component Value Date     (H) 10/18/2023     Lab Results   Component Value Date    VLDL 24 10/18/2023           ECG 12 Lead    Date/Time: 11/16/2023 1:00 PM  Performed by: Paco Cortez MD    Authorized by: Paco Cortez MD  Rhythm: sinus rhythm  Rate: normal  Conduction: left bundle branch block  QRS axis: normal  Other findings: non-specific ST-T wave changes    Clinical impression: abnormal EKG           Impression/Plan  1.  Nonischemic cardiomyopathy: Continue Toprol-XL 50 mg once a day.  Could not tolerate Entresto before.  Repeat echocardiogram.  2.  Chronic systolic heart failure stable: Continue Farxiga 10 mg once a day.  Continue Aldactone 25 mg once a day.  Monitor BMP.  Continue Lasix 40 mg once a day.  3.  Hyperlipidemia: Continue Crestor 20 mg once a day.  Monitor lipid and hepatic profile.  4.  Stable CORONARY ARTERY DISEASE: Continue metoprolol 50 mg once a day.  Add aspirin 81 mg once a day.        Electronically signed by Paco Cortez MD, 11/16/23, 12:47 PM EST.

## 2023-12-08 ENCOUNTER — TELEPHONE (OUTPATIENT)
Dept: INTERNAL MEDICINE | Facility: CLINIC | Age: 68
End: 2023-12-08

## 2023-12-08 DIAGNOSIS — I50.22 CHRONIC SYSTOLIC CONGESTIVE HEART FAILURE: ICD-10-CM

## 2023-12-08 RX ORDER — FUROSEMIDE 40 MG/1
40 TABLET ORAL DAILY
Qty: 30 TABLET | Refills: 0 | Status: SHIPPED | OUTPATIENT
Start: 2023-12-08 | End: 2024-03-07

## 2023-12-08 RX ORDER — TRAZODONE HYDROCHLORIDE 100 MG/1
100 TABLET ORAL NIGHTLY
Status: CANCELLED | OUTPATIENT
Start: 2023-12-08

## 2023-12-08 NOTE — TELEPHONE ENCOUNTER
Caller: Stefania Molina    Relationship: Self    Best call back number: [phone]     Requested Prescriptions:   Requested Prescriptions     Pending Prescriptions Disp Refills    traZODone (DESYREL) 100 MG tablet       Sig: Take 1 tablet by mouth Every Night.    furosemide (LASIX) 40 MG tablet 30 tablet 2     Sig: Take 1 tablet by mouth Daily for 90 days.        Pharmacy where request should be sent: [facility]/PHARMACY #[id number] - [place], [place] - [address] [facility] [phone] [facility] [phone] FX     Last office visit with prescribing clinician: 10/18/2023   Last telemedicine visit with prescribing clinician: Visit date not found   Next office visit with prescribing clinician: Visit date not found     Additional details provided by patient:     Does the patient have less than a 3 day supply:  [x] Yes  [] No    Would you like a call back once the refill request has been completed: [] Yes [] No    If the office needs to give you a call back, can they leave a voicemail: [] Yes [] No    Naatnael Pitt Rep   12/08/23 11:10 EST

## 2023-12-08 NOTE — TELEPHONE ENCOUNTER
I'm sending a courtesy refill of a month of lasix.  She apparently cancelled as she is out of network.  Does she intend to continue to follow at this clinic?    I'd like to have an appointment with her sometime in the next month for follow up.

## 2023-12-08 NOTE — TELEPHONE ENCOUNTER
Dr. Sauceda asked me to call the patient to talk to her about her insurance and her refills. She has [employer]. I called patient and shared since she is already a patient she can call the number on the back of her card and request to have continuity of care benefits so she can continue seeing her provider and not worry about out of network. Patient agreed and will call. I shared that the provider does need to see her for routine follow ups for medication refills so if she can do that so we don't have any delays with future refills. Patient will keep us updated.

## 2023-12-14 DIAGNOSIS — I50.22 CHRONIC SYSTOLIC CONGESTIVE HEART FAILURE: ICD-10-CM

## 2023-12-14 DIAGNOSIS — E11.65 TYPE 2 DIABETES MELLITUS WITH HYPERGLYCEMIA, UNSPECIFIED WHETHER LONG TERM INSULIN USE: ICD-10-CM

## 2023-12-14 DIAGNOSIS — N32.81 OVERACTIVE BLADDER: ICD-10-CM

## 2023-12-14 DIAGNOSIS — I10 ESSENTIAL HYPERTENSION: ICD-10-CM

## 2023-12-14 NOTE — TELEPHONE ENCOUNTER
Caller: Stefania Molina    Relationship: Self    Best call back number: [phone]     Requested Prescriptions:   Requested Prescriptions     Pending Prescriptions Disp Refills    traZODone (DESYREL) 100 MG tablet       Sig: Take 1 tablet by mouth Every Night.    allopurinol (ZYLOPRIM) 300 MG tablet 90 tablet 2     Sig: Take 1 tablet by mouth Daily.    furosemide (LASIX) 40 MG tablet 30 tablet 0     Sig: Take 1 tablet by mouth Daily for 90 days.    metoprolol succinate XL (Toprol XL) 50 MG 24 hr tablet 90 tablet 2     Sig: Take 1 tablet by mouth Daily.    Mirabegron ER (MYRBETRIQ) 25 MG tablet sustained-release 24 hour 24 hr tablet 90 tablet 2     Sig: Take 1 tablet by mouth Daily.    spironolactone (ALDACTONE) 25 MG tablet 90 tablet 2     Sig: Take 1 tablet by mouth Daily.        Pharmacy where request should be sent: [facility]/PHARMACY #[id number] - [place], [place] - [address] [facility] - [phone]  - [phone] FX     Last office visit with prescribing clinician: 10/18/2023   Last telemedicine visit with prescribing clinician: Visit date not found   Next office visit with prescribing clinician: Visit date not found     Additional details provided by patient: PATIENT IS CHANGING INSURANCE FIRST OF THE YEAR    Does the patient have less than a 3 day supply:  [] Yes  [] No    Would you like a call back once the refill request has been completed: [] Yes [] No    If the office needs to give you a call back, can they leave a voicemail: [] Yes [] No    Rozina Real, PCT   12/14/23 15:41 EST

## 2023-12-15 RX ORDER — METOPROLOL SUCCINATE 50 MG/1
50 TABLET, EXTENDED RELEASE ORAL DAILY
Qty: 90 TABLET | Refills: 2 | Status: SHIPPED | OUTPATIENT
Start: 2023-12-15

## 2023-12-15 RX ORDER — TRAZODONE HYDROCHLORIDE 100 MG/1
100 TABLET ORAL NIGHTLY
Qty: 90 TABLET | Refills: 1 | Status: SHIPPED | OUTPATIENT
Start: 2023-12-15

## 2023-12-15 RX ORDER — ALLOPURINOL 300 MG/1
300 TABLET ORAL DAILY
Qty: 90 TABLET | Refills: 2 | Status: SHIPPED | OUTPATIENT
Start: 2023-12-15

## 2023-12-15 RX ORDER — SPIRONOLACTONE 25 MG/1
25 TABLET ORAL DAILY
Qty: 90 TABLET | Refills: 2 | Status: SHIPPED | OUTPATIENT
Start: 2023-12-15

## 2023-12-15 RX ORDER — FUROSEMIDE 40 MG/1
40 TABLET ORAL DAILY
Qty: 30 TABLET | Refills: 0 | Status: SHIPPED | OUTPATIENT
Start: 2023-12-15 | End: 2024-03-14

## 2024-02-08 ENCOUNTER — HOSPITAL ENCOUNTER (OUTPATIENT)
Dept: CARDIOLOGY | Facility: HOSPITAL | Age: 69
Discharge: HOME | End: 2024-02-08
Admitting: SPECIALIST
Payer: MEDICARE

## 2024-02-08 DIAGNOSIS — I50.22 SYSTOLIC CHF, CHRONIC: ICD-10-CM

## 2024-02-08 DIAGNOSIS — I42.0 NONISCHEMIC DILATED CARDIOMYOPATHY: ICD-10-CM

## 2024-02-08 PROCEDURE — 93306 TTE W/DOPPLER COMPLETE: CPT

## 2024-02-09 ENCOUNTER — TELEPHONE (OUTPATIENT)
Dept: CARDIOLOGY | Facility: CLINIC | Age: 69
End: 2024-02-09
Payer: MEDICARE

## 2024-02-09 DIAGNOSIS — I42.0 NONISCHEMIC DILATED CARDIOMYOPATHY: Primary | ICD-10-CM

## 2024-02-09 LAB
AORTIC DIMENSIONLESS INDEX: 0.82 (DI)
BH CV ECHO MEAS - ACS: 1.6 CM
BH CV ECHO MEAS - AI P1/2T: 469.4 MSEC
BH CV ECHO MEAS - AO MAX PG: 7 MMHG
BH CV ECHO MEAS - AO MEAN PG: 4 MMHG
BH CV ECHO MEAS - AO ROOT DIAM: 2.8 CM
BH CV ECHO MEAS - AO V2 MAX: 135 CM/SEC
BH CV ECHO MEAS - AO V2 VTI: 28.1 CM
BH CV ECHO MEAS - AVA(I,D): 2.6 CM2
BH CV ECHO MEAS - EDV(CUBED): 132.7 ML
BH CV ECHO MEAS - EDV(MOD-SP2): 96.7 ML
BH CV ECHO MEAS - EDV(MOD-SP4): 105 ML
BH CV ECHO MEAS - EF(MOD-BP): 36.6 %
BH CV ECHO MEAS - EF(MOD-SP2): 26.8 %
BH CV ECHO MEAS - EF(MOD-SP4): 43.6 %
BH CV ECHO MEAS - ESV(CUBED): 59.3 ML
BH CV ECHO MEAS - ESV(MOD-SP2): 70.8 ML
BH CV ECHO MEAS - ESV(MOD-SP4): 59.2 ML
BH CV ECHO MEAS - FS: 23.5 %
BH CV ECHO MEAS - IVS/LVPW: 1 CM
BH CV ECHO MEAS - IVSD: 0.8 CM
BH CV ECHO MEAS - LA DIMENSION: 4.1 CM
BH CV ECHO MEAS - LAT PEAK E' VEL: 7.1 CM/SEC
BH CV ECHO MEAS - LV DIASTOLIC VOL/BSA (35-75): 56.6 CM2
BH CV ECHO MEAS - LV MASS(C)D: 140.5 GRAMS
BH CV ECHO MEAS - LV MAX PG: 5.7 MMHG
BH CV ECHO MEAS - LV MEAN PG: 3 MMHG
BH CV ECHO MEAS - LV SYSTOLIC VOL/BSA (12-30): 31.9 CM2
BH CV ECHO MEAS - LV V1 MAX: 119 CM/SEC
BH CV ECHO MEAS - LV V1 VTI: 23 CM
BH CV ECHO MEAS - LVIDD: 5.1 CM
BH CV ECHO MEAS - LVIDS: 3.9 CM
BH CV ECHO MEAS - LVOT AREA: 3.1 CM2
BH CV ECHO MEAS - LVOT DIAM: 2 CM
BH CV ECHO MEAS - LVPWD: 0.8 CM
BH CV ECHO MEAS - MED PEAK E' VEL: 4.2 CM/SEC
BH CV ECHO MEAS - MR MAX PG: 96.8 MMHG
BH CV ECHO MEAS - MR MAX VEL: 492 CM/SEC
BH CV ECHO MEAS - MR MEAN PG: 57 MMHG
BH CV ECHO MEAS - MR MEAN VEL: 351 CM/SEC
BH CV ECHO MEAS - MR VTI: 148 CM
BH CV ECHO MEAS - MV A MAX VEL: 119 CM/SEC
BH CV ECHO MEAS - MV DEC SLOPE: 923 CM/SEC2
BH CV ECHO MEAS - MV DEC TIME: 0.2 SEC
BH CV ECHO MEAS - MV E MAX VEL: 64.3 CM/SEC
BH CV ECHO MEAS - MV E/A: 0.54
BH CV ECHO MEAS - MV MEAN PG: 3 MMHG
BH CV ECHO MEAS - MV P1/2T: 30.9 MSEC
BH CV ECHO MEAS - MV V2 VTI: 23.2 CM
BH CV ECHO MEAS - MVA(P1/2T): 7.1 CM2
BH CV ECHO MEAS - MVA(VTI): 3.1 CM2
BH CV ECHO MEAS - PA V2 MAX: 119 CM/SEC
BH CV ECHO MEAS - PULM A REVS DUR: 0.14 SEC
BH CV ECHO MEAS - PULM A REVS VEL: 51.1 CM/SEC
BH CV ECHO MEAS - PULM DIAS VEL: 44.8 CM/SEC
BH CV ECHO MEAS - PULM S/D: 1.47
BH CV ECHO MEAS - PULM SYS VEL: 65.8 CM/SEC
BH CV ECHO MEAS - QP/QS: 1.17
BH CV ECHO MEAS - RV MAX PG: 5.2 MMHG
BH CV ECHO MEAS - RV V1 MAX: 114 CM/SEC
BH CV ECHO MEAS - RV V1 VTI: 26.9 CM
BH CV ECHO MEAS - RVDD: 3.2 CM
BH CV ECHO MEAS - RVOT DIAM: 2 CM
BH CV ECHO MEAS - SI(MOD-SP2): 14 ML/M2
BH CV ECHO MEAS - SI(MOD-SP4): 24.7 ML/M2
BH CV ECHO MEAS - SV(LVOT): 72.3 ML
BH CV ECHO MEAS - SV(MOD-SP2): 25.9 ML
BH CV ECHO MEAS - SV(MOD-SP4): 45.8 ML
BH CV ECHO MEAS - SV(RVOT): 84.5 ML
BH CV ECHO MEAS - TAPSE (>1.6): 1.99 CM
BH CV ECHO MEASUREMENTS AVERAGE E/E' RATIO: 11.38
BH CV XLRA - RV BASE: 3.8 CM
BH CV XLRA - TDI S': 13.5 CM/SEC
LEFT ATRIUM VOLUME INDEX: 24.8 ML/M2
LV EF 2D ECHO EST: 40 %

## 2024-02-09 RX ORDER — VALSARTAN 80 MG/1
80 TABLET ORAL DAILY
Qty: 90 TABLET | Refills: 3 | Status: SHIPPED | OUTPATIENT
Start: 2024-02-09

## 2024-02-09 RX ORDER — ACYCLOVIR 400 MG/1
400 TABLET ORAL 3 TIMES DAILY
Qty: 30 TABLET | Refills: 0 | Status: SHIPPED | OUTPATIENT
Start: 2024-02-09 | End: 2024-02-19

## 2024-02-09 NOTE — TELEPHONE ENCOUNTER
----- Message from FAWAD Fuentes sent at 2/9/2024 12:52 PM EST -----  Echocardiogram shows ejection fraction 40% which is improved from prior echocardiogram 1/21/2023, recommend starting valsartan 80 mg daily.  Monitor blood pressure daily for the first 2 weeks of medication initiation.  Check BMP and proBNP in 2 weeks.  Follow-up as scheduled.

## 2024-02-22 ENCOUNTER — HOSPITAL ENCOUNTER (EMERGENCY)
Facility: HOSPITAL | Age: 69
Discharge: HOME | End: 2024-02-23
Attending: EMERGENCY MEDICINE
Payer: MEDICARE

## 2024-02-22 DIAGNOSIS — R53.1 WEAKNESS: Primary | ICD-10-CM

## 2024-02-22 DIAGNOSIS — N28.9 ACUTE RENAL INSUFFICIENCY: ICD-10-CM

## 2024-02-22 LAB
ALBUMIN SERPL-MCNC: 3.9 G/DL (ref 3.5–5.2)
ALBUMIN/GLOB SERPL: 1.4 G/DL
ALP SERPL-CCNC: 69 U/L (ref 39–117)
ALT SERPL W P-5'-P-CCNC: 12 U/L (ref 1–33)
ANION GAP SERPL CALCULATED.3IONS-SCNC: 13 MMOL/L (ref 5–15)
AST SERPL-CCNC: 17 U/L (ref 1–32)
BASOPHILS # BLD AUTO: 0.03 10*3/MM3 (ref 0–0.2)
BASOPHILS NFR BLD AUTO: 0.4 % (ref 0–1.5)
BILIRUB SERPL-MCNC: 0.2 MG/DL (ref 0–1.2)
BUN SERPL-MCNC: 55 MG/DL (ref 8–23)
BUN/CREAT SERPL: 28.8 (ref 7–25)
CALCIUM SPEC-SCNC: 9.4 MG/DL (ref 8.6–10.5)
CHLORIDE SERPL-SCNC: 99 MMOL/L (ref 98–107)
CO2 SERPL-SCNC: 23 MMOL/L (ref 22–29)
CREAT SERPL-MCNC: 1.91 MG/DL (ref 0.57–1)
DEPRECATED RDW RBC AUTO: 41.3 FL (ref 37–54)
EGFRCR SERPLBLD CKD-EPI 2021: 27.9 ML/MIN/1.73
EOSINOPHIL # BLD AUTO: 0.16 10*3/MM3 (ref 0–0.4)
EOSINOPHIL NFR BLD AUTO: 2 % (ref 0.3–6.2)
ERYTHROCYTE [DISTWIDTH] IN BLOOD BY AUTOMATED COUNT: 12.7 % (ref 12.3–15.4)
GLOBULIN UR ELPH-MCNC: 2.8 GM/DL
GLUCOSE SERPL-MCNC: 106 MG/DL (ref 65–99)
HCT VFR BLD AUTO: 33.4 % (ref 34–46.6)
HGB BLD-MCNC: 10.9 G/DL (ref 12–15.9)
HOLD SPECIMEN: NORMAL
HOLD SPECIMEN: NORMAL
IMM GRANULOCYTES # BLD AUTO: 0.02 10*3/MM3 (ref 0–0.05)
IMM GRANULOCYTES NFR BLD AUTO: 0.3 % (ref 0–0.5)
LYMPHOCYTES # BLD AUTO: 1.93 10*3/MM3 (ref 0.7–3.1)
LYMPHOCYTES NFR BLD AUTO: 24.2 % (ref 19.6–45.3)
MAGNESIUM SERPL-MCNC: 2.3 MG/DL (ref 1.6–2.4)
MCH RBC QN AUTO: 29 PG (ref 26.6–33)
MCHC RBC AUTO-ENTMCNC: 32.6 G/DL (ref 31.5–35.7)
MCV RBC AUTO: 88.8 FL (ref 79–97)
MONOCYTES # BLD AUTO: 0.47 10*3/MM3 (ref 0.1–0.9)
MONOCYTES NFR BLD AUTO: 5.9 % (ref 5–12)
NEUTROPHILS NFR BLD AUTO: 5.38 10*3/MM3 (ref 1.7–7)
NEUTROPHILS NFR BLD AUTO: 67.2 % (ref 42.7–76)
NRBC BLD AUTO-RTO: 0 /100 WBC (ref 0–0.2)
PLATELET # BLD AUTO: 164 10*3/MM3 (ref 140–450)
PMV BLD AUTO: 10.2 FL (ref 6–12)
POTASSIUM SERPL-SCNC: 4.5 MMOL/L (ref 3.5–5.2)
PROT SERPL-MCNC: 6.7 G/DL (ref 6–8.5)
RBC # BLD AUTO: 3.76 10*6/MM3 (ref 3.77–5.28)
SODIUM SERPL-SCNC: 135 MMOL/L (ref 136–145)
TROPONIN T SERPL HS-MCNC: 65 NG/L
WBC NRBC COR # BLD AUTO: 7.99 10*3/MM3 (ref 3.4–10.8)
WHOLE BLOOD HOLD COAG: NORMAL
WHOLE BLOOD HOLD SPECIMEN: NORMAL

## 2024-02-22 PROCEDURE — 93005 ELECTROCARDIOGRAM TRACING: CPT | Performed by: EMERGENCY MEDICINE

## 2024-02-22 PROCEDURE — 84484 ASSAY OF TROPONIN QUANT: CPT

## 2024-02-22 PROCEDURE — 80053 COMPREHEN METABOLIC PANEL: CPT

## 2024-02-22 PROCEDURE — 99284 EMERGENCY DEPT VISIT MOD MDM: CPT

## 2024-02-22 PROCEDURE — 85025 COMPLETE CBC W/AUTO DIFF WBC: CPT

## 2024-02-22 PROCEDURE — 36415 COLL VENOUS BLD VENIPUNCTURE: CPT

## 2024-02-22 PROCEDURE — 83735 ASSAY OF MAGNESIUM: CPT

## 2024-02-22 PROCEDURE — 93010 ELECTROCARDIOGRAM REPORT: CPT | Performed by: INTERNAL MEDICINE

## 2024-02-22 PROCEDURE — 93005 ELECTROCARDIOGRAM TRACING: CPT

## 2024-02-22 RX ORDER — SODIUM CHLORIDE 0.9 % (FLUSH) 0.9 %
10 SYRINGE (ML) INJECTION AS NEEDED
Status: DISCONTINUED | OUTPATIENT
Start: 2024-02-22 | End: 2024-02-23 | Stop reason: HOSPADM

## 2024-02-23 ENCOUNTER — APPOINTMENT (OUTPATIENT)
Dept: GENERAL RADIOLOGY | Facility: HOSPITAL | Age: 69
End: 2024-02-23
Payer: MEDICARE

## 2024-02-23 VITALS
HEART RATE: 67 BPM | DIASTOLIC BLOOD PRESSURE: 82 MMHG | WEIGHT: 195.77 LBS | TEMPERATURE: 98.2 F | OXYGEN SATURATION: 95 % | HEIGHT: 67 IN | SYSTOLIC BLOOD PRESSURE: 146 MMHG | BODY MASS INDEX: 30.73 KG/M2 | RESPIRATION RATE: 18 BRPM

## 2024-02-23 LAB
BACTERIA UR QL AUTO: ABNORMAL /HPF
BILIRUB UR QL STRIP: NEGATIVE
CLARITY UR: CLEAR
COLOR UR: YELLOW
FLUAV SUBTYP SPEC NAA+PROBE: NOT DETECTED
FLUBV RNA ISLT QL NAA+PROBE: NOT DETECTED
GEN 5 2HR TROPONIN T REFLEX: 67 NG/L
GLUCOSE UR STRIP-MCNC: ABNORMAL MG/DL
HGB UR QL STRIP.AUTO: NEGATIVE
HYALINE CASTS UR QL AUTO: ABNORMAL /LPF
KETONES UR QL STRIP: NEGATIVE
LEUKOCYTE ESTERASE UR QL STRIP.AUTO: ABNORMAL
NITRITE UR QL STRIP: NEGATIVE
PH UR STRIP.AUTO: 5.5 [PH] (ref 5–8)
PROT UR QL STRIP: NEGATIVE
RBC # UR STRIP: ABNORMAL /HPF
REF LAB TEST METHOD: ABNORMAL
RSV RNA NPH QL NAA+NON-PROBE: NOT DETECTED
SARS-COV-2 RNA RESP QL NAA+PROBE: NOT DETECTED
SP GR UR STRIP: 1.01 (ref 1–1.03)
SQUAMOUS #/AREA URNS HPF: ABNORMAL /HPF
TROPONIN T DELTA: 2 NG/L
UROBILINOGEN UR QL STRIP: ABNORMAL
WBC # UR STRIP: ABNORMAL /HPF

## 2024-02-23 PROCEDURE — 81001 URINALYSIS AUTO W/SCOPE: CPT | Performed by: EMERGENCY MEDICINE

## 2024-02-23 PROCEDURE — 84484 ASSAY OF TROPONIN QUANT: CPT | Performed by: EMERGENCY MEDICINE

## 2024-02-23 PROCEDURE — 87637 SARSCOV2&INF A&B&RSV AMP PRB: CPT | Performed by: EMERGENCY MEDICINE

## 2024-02-23 PROCEDURE — 71045 X-RAY EXAM CHEST 1 VIEW: CPT

## 2024-02-23 NOTE — DISCHARGE INSTRUCTIONS
Please stop your valsartan and call your cardiologist today to discuss a low blood pressure and further instructions with your blood pressure medication    Your BUN and creatinine were increased today.  This certainly can be from the Lasix, spironolactone and valsartan.  Please stop the valsartan and cut the Lasix to 20 mg a day.  Please have your doctor recheck your renal function or BUN, creatinine and GFR on Monday    Please check your blood pressure twice a day, record and discuss those readings with your cardiologist    Please return to the emergency immediately for chest pain, chest pressure, shortness of breath, near passing out, passing out, unusual fatigue, unusual sweating or any new symptoms that you are concerned with

## 2024-02-23 NOTE — ED PROVIDER NOTES
Time: 9:49 PM EST  Date of encounter:  2/22/2024  Independent Historian/Clinical History and Information was obtained by:   Patient    History is limited by: N/A    Chief complaint: Weakness and fatigue    History of Present Illness:  Patient is a 68 y.o. year old female who presents to the emergency department for evaluation of weakness and fatigue.  The patient states that she has had weakness and fatigue and lightheadedness since Monday.  She states that she started on losartan on that day prescribed by her cardiologist, Dr. Sood.  She states she is not exactly sure why she was started on that medication other than she does have congestive heart failure.  She does note since that time she has been feeling well.  She has been lightheaded with standing and fatigued.  She does note shortness of breath but this is chronic and unchanged.  The patient's had no chest pain or chest pressure.  The patient's had no headache.  Patient's had no vomiting or diarrhea.  Patient's had no cough.  Patient's had no sore throat.  The patient's had no abdominal pain.  Patient denies any urinary frequency, urgency or dysuria.  Patient denies any swelling in the legs.  The patient does note that she has been checking her blood pressure at home.  She states that 1 time on her wrist monitor her systolic blood pressure was 60.  It has improved since has been in the emergency room.    Patient Care Team  Primary Care Provider: Angie Orourke APRN    Past Medical History:     Allergies   Allergen Reactions    Entresto [Sacubitril-Valsartan] Unknown - Low Severity     Past Medical History:   Diagnosis Date    CHF (congestive heart failure)     Diabetes mellitus     Fluid retention     Gout     Hypertension     Stroke     Tachycardia      Past Surgical History:   Procedure Laterality Date    TONSILLECTOMY AND ADENOIDECTOMY       History reviewed. No pertinent family history.    Home Medications:  Prior to Admission medications     Medication Sig Start Date End Date Taking? Authorizing Provider   allopurinol (ZYLOPRIM) 300 MG tablet Take 1 tablet by mouth Daily. 12/15/23   Angie Oruorke APRN   aspirin 81 MG EC tablet Take 1 tablet by mouth Daily. 23   Paco Cortez MD   dapagliflozin Propanediol (Farxiga) 10 MG tablet Take 10 mg by mouth Daily. 23   Parker Fontana MD   dicyclomine (BENTYL) 10 MG capsule Take 1 capsule by mouth 4 (Four) Times a Day Before Meals & at Bedtime As Needed for Abdominal Cramping. 23   Parker Fontana MD   furosemide (LASIX) 40 MG tablet Take 1 tablet by mouth Daily for 90 days. 12/15/23 3/14/24  Angie Orourke APRN   metoprolol succinate XL (Toprol XL) 50 MG 24 hr tablet Take 1 tablet by mouth Daily. 12/15/23   Angie Orourke APRN   Mirabegron ER (MYRBETRIQ) 25 MG tablet sustained-release 24 hour 24 hr tablet Take 1 tablet by mouth Daily. 12/15/23   Angie Orourke APRN   rOPINIRole (REQUIP) 1 MG tablet Take 1 tablet by mouth 3 (Three) Times a Day. Take 1 hour before bedtime. 23   Parker Fontana MD   rosuvastatin (Crestor) 20 MG tablet Take 1 tablet by mouth Daily. 23   Parker Fontana MD   spironolactone (ALDACTONE) 25 MG tablet Take 1 tablet by mouth Daily. 12/15/23   Angie Orourke APRN   traZODone (DESYREL) 100 MG tablet Take 1 tablet by mouth Every Night. 12/15/23   Angie Orourke APRN   valsartan (DIOVAN) 80 MG tablet Take 1 tablet by mouth Daily. 24   Latrice Allen APRN        Social History:   Social History     Tobacco Use    Smoking status: Former     Types: Cigarettes     Quit date:      Years since quittin.1    Smokeless tobacco: Never   Substance Use Topics    Alcohol use: Never    Drug use: Never         Review of Systems:  Review of Systems   Constitutional:  Positive for fatigue. Negative for chills, diaphoresis and fever.   HENT:  Negative for congestion, postnasal drip, rhinorrhea and sore  "throat.    Eyes:  Negative for photophobia.   Respiratory:  Positive for shortness of breath. Negative for cough and chest tightness.    Cardiovascular:  Negative for chest pain, palpitations and leg swelling.   Gastrointestinal:  Negative for abdominal pain, diarrhea, nausea and vomiting.   Genitourinary:  Negative for difficulty urinating, dysuria, flank pain, frequency, hematuria and urgency.   Musculoskeletal:  Negative for neck pain and neck stiffness.   Skin:  Negative for pallor and rash.   Neurological:  Positive for weakness and light-headedness. Negative for dizziness, syncope, numbness and headaches.   Hematological:  Negative for adenopathy. Does not bruise/bleed easily.   Psychiatric/Behavioral: Negative.          Physical Exam:  /82   Pulse 67   Temp 98.2 °F (36.8 °C) (Oral)   Resp 18   Ht 170 cm (66.93\")   Wt 88.8 kg (195 lb 12.3 oz)   SpO2 95%   BMI 30.73 kg/m²         Physical Exam  Vitals and nursing note reviewed.   Constitutional:       General: She is not in acute distress.     Appearance: Normal appearance. She is not ill-appearing, toxic-appearing or diaphoretic.   HENT:      Head: Normocephalic and atraumatic.      Mouth/Throat:      Mouth: Mucous membranes are moist.   Eyes:      Pupils: Pupils are equal, round, and reactive to light.   Cardiovascular:      Rate and Rhythm: Normal rate and regular rhythm.      Pulses: Normal pulses.           Carotid pulses are 2+ on the right side and 2+ on the left side.       Radial pulses are 2+ on the right side and 2+ on the left side.        Femoral pulses are 2+ on the right side and 2+ on the left side.       Popliteal pulses are 2+ on the right side and 2+ on the left side.        Dorsalis pedis pulses are 2+ on the right side and 2+ on the left side.        Posterior tibial pulses are 2+ on the right side and 2+ on the left side.      Heart sounds: Normal heart sounds. No murmur heard.  Pulmonary:      Effort: Pulmonary effort is " normal. No accessory muscle usage, respiratory distress or retractions.      Breath sounds: Normal breath sounds. No wheezing, rhonchi or rales.   Abdominal:      General: Abdomen is flat. There is no distension.      Palpations: Abdomen is soft. There is no mass or pulsatile mass.      Tenderness: There is no abdominal tenderness. There is no right CVA tenderness, left CVA tenderness, guarding or rebound.      Comments: No rigidity   Musculoskeletal:         General: No swelling, tenderness or deformity.      Cervical back: Neck supple. No tenderness.      Right lower leg: No edema.      Left lower leg: No edema.   Skin:     General: Skin is warm and dry.      Capillary Refill: Capillary refill takes less than 2 seconds.      Coloration: Skin is not jaundiced or pale.      Findings: No erythema.   Neurological:      General: No focal deficit present.      Mental Status: She is alert and oriented to person, place, and time. Mental status is at baseline.      Cranial Nerves: Cranial nerves 2-12 are intact. No cranial nerve deficit.      Sensory: Sensation is intact. No sensory deficit.      Motor: Motor function is intact. No weakness or pronator drift.      Coordination: Coordination is intact. Coordination normal.   Psychiatric:         Mood and Affect: Mood normal.         Behavior: Behavior normal.                Procedures:  Procedures      Medical Decision Making:      Comorbidities that affect care:    Hypertension, congestive heart failure, diabetes, hypercholesteremia    External Notes reviewed:    None      The following orders were placed and all results were independently analyzed by me:  Orders Placed This Encounter   Procedures    COVID-19, FLU A/B, RSV PCR 1 HR TAT - Swab, Nasopharynx    XR Chest 1 View    Painter Draw    Comprehensive Metabolic Panel    Magnesium    Single High Sensitivity Troponin T    CBC Auto Differential    High Sensitivity Troponin T 2Hr    Urinalysis With Culture If Indicated -  Urine, Clean Catch    Urinalysis, Microscopic Only - Urine, Clean Catch    Undress & Gown    Continuous Pulse Oximetry    Vital Signs    ECG 12 Lead ED Triage Standing Order; Syncope    CBC & Differential    Green Top (Gel)    Lavender Top    Gold Top - SST    Light Blue Top       Medications Given in the Emergency Department:  Medications - No data to display       ED Course:    The patient was initially evaluated in the triage area where orders were placed. The patient was later dispositioned by Ghassan Jerry DO.      The patient was advised to stay for completion of workup which includes but is not limited to communication of labs and radiological results, reassessment and plan. The patient was advised that leaving prior to disposition by a provider could result in critical findings that are not communicated to the patient.     ED Course as of 02/26/24 0243   Thu Feb 22, 2024 2040 EKG:    Rhythm: Sinus rhythm  Rate: 70  Intervals: Normal KS and QT interval  Left bundle branch block present  T-wave: Isolated nonspecific T wave inversion in III, nonspecific T wave flattening  ST Segment: Nonspecific ST segment V1, V2, no obvious pathological ST elevation and reciprocal ST depression to suggest STEMI    EKG Comparison: No change in the QRS and ST morphology from the EKG that was performed January 20, 2023  Interpreted by me   [SD]   2150 --- PROVIDER IN TRIAGE NOTE ---    The patient was evaluated by Maria De Jesus fairbanks in triage. Orders were placed and the patient is currently awaiting disposition.    [AJ]      ED Course User Index  [AJ] Maria De Jesus Cha PA-C  [SD] Ghassan Jerry DO       Labs:    Lab Results (last 24 hours)       ** No results found for the last 24 hours. **             Imaging:    No Radiology Exams Resulted Within Past 24 Hours      Differential Diagnosis and Discussion:      Weakness: Based on the patient's history, signs, and symptoms, the diffential diagnosis includes but is not  limited to meningitis, stroke, sepsis, subarachnoid hemorrhage, intracranial bleeding, encephalitis, acute uti, dehydration, MS, myasthenia gravis, [place], migraine variant, neuromuscular disorders vertigo, electrolyte imbalance, and metabolic disorders.    All labs were reviewed and interpreted by me.  All X-rays impressions were independently interpreted by me.  EKG was interpreted by me.    MDM  Number of Diagnoses or Management Options  Acute renal insufficiency  Weakness  Diagnosis management comments:     The patient's vital signs are stable in the emergency room.    The patient's Covid swab was negative   The patient's Influenza swab was negative     The patient's urinalysis was negative for obvious infection or blood    The patient's CBC was reviewed and shows no abnormalities of critical concern.  Of note, there is no anemia requiring a blood transfusion and the platelet count is acceptable    The patient's CMP was reviewed and shows no abnormalities of critical concern.  Of note, the patient's sodium and potassium are acceptable.  The patient's liver enzymes are unremarkable.  The patient's renal function mistreats a BUN of 55 and a creatinine 1.91.  The patient's GFR was 27.9.  The patient has a normal anion gap.    Patient's initial troponin was 65.  Patient's repeat troponin 2 hours later was 67.  This is a delta of 2.  This is considered clinically insignificant.  I do feel the patient's troponin was slightly elevated due to the patient's kidney disease.    Chest x-ray was performed while in the emergency room.  The chest x-ray demonstrated no acute cardiopulmonary process    The patient's EKG demonstrated a normal sinus rhythm.  The EKG demonstrated no evidence of dysrhythmia.  The patient had no acute ST abnormalities or acute T wave abnormalities to indicate STEMI or other acute cardiac pathology, injury or ischemia      The patient's vital signs were stable while in the emergency  room.    The patient appeared to have worsening renal insufficiency by labs today.  This certainly can be caused by the combination of the valsartan, Lasix and spironolactone.  The patient notes that her blood pressure has been low since starting valsartan, so I will stop that medication.  I will have the patient cut her Lasix from 40 mg a day to 20 mg a day.  The patient will follow-up with her doctor on Monday to recheck the renal function.  I discussed this with her in detail.  The patient will also call her cardiologist today to determine further instructions on blood pressure control.    The patient was given very specific instructions on when and why to return to the emergency room.  The patient voiced understanding and felt comfortable with the discharge instructions.  They would return to the emergency room if necessary.  The patient appears appropriate for discharge and outpatient follow-up.         Amount and/or Complexity of Data Reviewed  Clinical lab tests: reviewed  Tests in the radiology section of CPT®: reviewed  Tests in the medicine section of CPT®: reviewed  Decide to obtain previous medical records or to obtain history from someone other than the patient: yes         Social Determinants of Health:    Patient is independent, reliable, and has access to care.       Disposition and Care Coordination:    Discharged: The patient is suitable and stable for discharge with no need for consideration of admission.    I have explained discharge medications and the need for follow up with the patient/caretakers. This was also printed in the discharge instructions. Patient was discharged with the following medications and follow up:      Medication List        Stop      valsartan 80 MG tablet  Commonly known as: Angie Giraldo, FAWAD  [address] [facility] [address]  [place] [address]  [phone]    On 2/26/2024  Acute renal insufficiency, call for appointment    Paco Cortez  MD  [address] [place] [address]  [phone]    Call on 2/23/2024  Blood pressure management and congestive heart failure, call for appointment       Final diagnoses:   Weakness   Acute renal insufficiency        ED Disposition       ED Disposition   Discharge    Condition   Stable    Comment   --               This medical record created using voice recognition software.             Ghassan eJrry DO  02/26/24 0249

## 2024-02-26 LAB
QT INTERVAL: 441 MS
QTC INTERVAL: 476 MS

## 2024-02-27 ENCOUNTER — OFFICE VISIT (OUTPATIENT)
Dept: CARDIOLOGY | Facility: CLINIC | Age: 69
End: 2024-02-27
Payer: MEDICARE

## 2024-02-27 ENCOUNTER — TELEPHONE (OUTPATIENT)
Dept: GASTROENTEROLOGY | Facility: CLINIC | Age: 69
End: 2024-02-27
Payer: MEDICARE

## 2024-02-27 VITALS
HEART RATE: 78 BPM | DIASTOLIC BLOOD PRESSURE: 76 MMHG | SYSTOLIC BLOOD PRESSURE: 130 MMHG | HEIGHT: 67 IN | WEIGHT: 193 LBS | BODY MASS INDEX: 30.29 KG/M2

## 2024-02-27 DIAGNOSIS — I42.8 NICM (NONISCHEMIC CARDIOMYOPATHY): ICD-10-CM

## 2024-02-27 DIAGNOSIS — I25.10 CORONARY ARTERY DISEASE INVOLVING NATIVE CORONARY ARTERY OF NATIVE HEART WITHOUT ANGINA PECTORIS: Primary | ICD-10-CM

## 2024-02-27 DIAGNOSIS — I50.22 CHRONIC SYSTOLIC HEART FAILURE: ICD-10-CM

## 2024-02-27 PROBLEM — M1A.9XX0 CHRONIC GOUT WITHOUT TOPHUS: Status: ACTIVE | Noted: 2024-02-27

## 2024-02-27 PROBLEM — E78.5 HYPERLIPIDEMIA: Status: ACTIVE | Noted: 2023-02-03

## 2024-02-27 PROBLEM — G25.81 RESTLESS LEGS SYNDROME: Status: ACTIVE | Noted: 2024-02-27

## 2024-02-27 PROBLEM — Z86.73 HISTORY OF CEREBROVASCULAR ACCIDENT: Status: RESOLVED | Noted: 2023-02-03 | Resolved: 2024-02-27

## 2024-02-27 PROBLEM — E11.9 TYPE 2 DIABETES MELLITUS: Status: ACTIVE | Noted: 2023-02-03

## 2024-02-27 PROBLEM — J45.909 ASTHMA: Status: ACTIVE | Noted: 2023-02-03

## 2024-02-27 PROBLEM — I83.90 VARICOSE VEINS OF LOWER EXTREMITY: Status: ACTIVE | Noted: 2023-07-12

## 2024-02-27 PROBLEM — G47.00 INSOMNIA: Status: ACTIVE | Noted: 2024-02-27

## 2024-02-27 PROBLEM — J34.2 DEVIATED NASAL SEPTUM: Status: ACTIVE | Noted: 2024-02-27

## 2024-02-27 PROBLEM — J42 CHRONIC BRONCHITIS: Status: ACTIVE | Noted: 2024-02-27

## 2024-02-27 PROCEDURE — 99214 OFFICE O/P EST MOD 30 MIN: CPT | Performed by: NURSE PRACTITIONER

## 2024-02-27 PROCEDURE — 1160F RVW MEDS BY RX/DR IN RCRD: CPT | Performed by: NURSE PRACTITIONER

## 2024-02-27 PROCEDURE — 3075F SYST BP GE 130 - 139MM HG: CPT | Performed by: NURSE PRACTITIONER

## 2024-02-27 PROCEDURE — 1159F MED LIST DOCD IN RCRD: CPT | Performed by: NURSE PRACTITIONER

## 2024-02-27 PROCEDURE — 3078F DIAST BP <80 MM HG: CPT | Performed by: NURSE PRACTITIONER

## 2024-02-27 RX ORDER — ACYCLOVIR 400 MG/1
TABLET ORAL
COMMUNITY

## 2024-02-27 RX ORDER — FUROSEMIDE 20 MG/1
40 TABLET ORAL DAILY
Qty: 90 TABLET | Refills: 1 | Status: SHIPPED | OUTPATIENT
Start: 2024-02-27

## 2024-02-27 RX ORDER — UBIDECARENONE 200 MG
CAPSULE ORAL
COMMUNITY

## 2024-02-27 NOTE — PROGRESS NOTES
"Chief Complaint  Follow-up    Subjective            History of Present Illness  Stefania Molina is a 68-year-old female patient who presents to the office today for follow-up.  She has CAD, chronic systolic heart failure, and nonischemic cardiomyopathy.  She was seen by Dr. Cortez on 2023 noticed a new patient.  At that time he had ordered an echocardiogram for evaluation of her heart function.  Echocardiogram was performed on 2024 and shows ejection fraction of 35 to 40% with trace amount of pericardial effusion.  She refuses to take aspirin due to prior adverse effect of purpura and refuses to take statin due to potential adverse effect of dementia. She reports cutting her lasix dose in half on her own after going to ER for \"dehydration\" on 24. She denies any new or worsening symptoms today.     PMH  Past Medical History:   Diagnosis Date    CAD 10/18/2023    CHF (congestive heart failure)     Diabetes mellitus     Fluid retention     Gout     Hypertension     Stroke     Tachycardia          ALLERGY  Allergies   Allergen Reactions    Clopidogrel Other (See Comments)     Nosebleed    Entresto [Sacubitril-Valsartan] Other (See Comments)     hypotension          SURGICALHX  Past Surgical History:   Procedure Laterality Date    TONSILLECTOMY AND ADENOIDECTOMY            SOC  Social History     Socioeconomic History    Marital status:    Tobacco Use    Smoking status: Former     Types: Cigarettes     Quit date:      Years since quittin.1    Smokeless tobacco: Never   Vaping Use    Vaping Use: Never used   Substance and Sexual Activity    Alcohol use: Never    Drug use: Never    Sexual activity: Defer         FAMHX  History reviewed. No pertinent family history.       MEDSIGONLY  Current Outpatient Medications on File Prior to Visit   Medication Sig    acyclovir (ZOVIRAX) 400 MG tablet Oral    allopurinol (ZYLOPRIM) 300 MG tablet Take 1 tablet by mouth Daily.    Coenzyme Q10 200 MG capsule " "Take  by mouth.    dapagliflozin Propanediol (Farxiga) 10 MG tablet Take 10 mg by mouth Daily.    dicyclomine (BENTYL) 10 MG capsule Take 1 capsule by mouth 4 (Four) Times a Day Before Meals & at Bedtime As Needed for Abdominal Cramping.    metoprolol succinate XL (Toprol XL) 50 MG 24 hr tablet Take 1 tablet by mouth Daily.    Mirabegron ER (MYRBETRIQ) 25 MG tablet sustained-release 24 hour 24 hr tablet Take 1 tablet by mouth Daily.    rOPINIRole (REQUIP) 1 MG tablet Take 1 tablet by mouth 3 (Three) Times a Day. Take 1 hour before bedtime.    spironolactone (ALDACTONE) 25 MG tablet Take 1 tablet by mouth Daily.    traZODone (DESYREL) 100 MG tablet Take 1 tablet by mouth Every Night.    [DISCONTINUED] furosemide (LASIX) 40 MG tablet Take 1 tablet by mouth Daily for 90 days.    [DISCONTINUED] aspirin 81 MG EC tablet Take 1 tablet by mouth Daily. (Patient not taking: Reported on 2/27/2024)    [DISCONTINUED] rosuvastatin (Crestor) 20 MG tablet Take 1 tablet by mouth Daily. (Patient not taking: Reported on 2/27/2024)     No current facility-administered medications on file prior to visit.         Objective   /76   Pulse 78   Ht 170 cm (66.93\")   Wt 87.5 kg (193 lb)   BMI 30.29 kg/m²       Physical Exam  HENT:      Head: Normocephalic.   Neck:      Vascular: No carotid bruit.   Cardiovascular:      Rate and Rhythm: Normal rate and regular rhythm.      Pulses: Normal pulses.      Heart sounds: Normal heart sounds. No murmur heard.  Pulmonary:      Effort: Pulmonary effort is normal.      Breath sounds: Normal breath sounds.   Musculoskeletal:      Cervical back: Neck supple.      Right lower leg: No edema.      Left lower leg: No edema.   Skin:     General: Skin is dry.   Neurological:      Mental Status: She is alert and oriented to person, place, and time.   Psychiatric:         Behavior: Behavior normal.       Result Review :   The following data was reviewed by: FAWAD Beard on 02/27/2024:  No " "results found for: \"PROBNP\"  CMP          2/22/2024    22:01   CMP   Glucose 106    BUN 55    Creatinine 1.91    EGFR 27.9    Sodium 135    Potassium 4.5    Chloride 99    Calcium 9.4    Total Protein 6.7    Albumin 3.9    Globulin 2.8    Total Bilirubin 0.2    Alkaline Phosphatase 69    AST (SGOT) 17    ALT (SGPT) 12    Albumin/Globulin Ratio 1.4    BUN/Creatinine Ratio 28.8    Anion Gap 13.0      CBC w/diff          2/22/2024    22:01   CBC w/Diff   WBC 7.99    RBC 3.76    Hemoglobin 10.9    Hematocrit 33.4    MCV 88.8    MCH 29.0    MCHC 32.6    RDW 12.7    Platelets 164    Neutrophil Rel % 67.2    Immature Granulocyte Rel % 0.3    Lymphocyte Rel % 24.2    Monocyte Rel % 5.9    Eosinophil Rel % 2.0    Basophil Rel % 0.4       Lab Results   Component Value Date    TSH 2.310 10/18/2023      No results found for: \"FREET4\"   No results found for: \"DDIMERQUANT\"  Magnesium   Date Value Ref Range Status   02/22/2024 2.3 1.6 - 2.4 mg/dL Final      No results found for: \"DIGOXIN\"   Lab Results   Component Value Date    TROPONINT 67 (C) 02/23/2024               10/18/2023    11:08   Lipid Panel   Total Cholesterol 212    Triglycerides 132    HDL Cholesterol 52    VLDL Cholesterol 24    LDL Cholesterol  136    LDL/HDL Ratio 2.57        Results for orders placed during the hospital encounter of 02/08/24    Adult Transthoracic Echo Complete W/ Cont if Necessary Per Protocol    Interpretation Summary  Fibrocalcific mitral and aortic valves.  Diffuse hypokinesis of the left ventricule and reduced left-ventricular systolic function ejection fraction around 35 to 40%.  Trace pericardial effusion.  Mild MR and mild TR.         Assessment and Plan    Diagnoses and all orders for this visit:    1. CAD (Primary)  She denies any anginal symptoms today, will not take aspirin therapy.  Recommending sporadically monitoring blood pressure at home since she is taking antihypertensive medications.  -     Blood Pressure Device    2. Chronic " systolic heart failure & 3. NICM (nonischemic cardiomyopathy)  Symptomatically stable at this time and euvolemic on exam today, continue to monitor for signs of fluid retention.  Recommending Lasix 20 mg daily, Farxiga 10 mg daily, metoprolol 50 mg daily, and spironolactone 25 mg daily.  Check BMP and proBNP in 2 weeks to assess CHF and renal function.  -     Basic Metabolic Panel; Future  -     proBNP; Future      Other orders  -     Misc. Devices (BMI Digital Smart Scale) misc; Use 1 each Daily.  Dispense: 1 each; Refill: 0            Follow Up   Return in about 6 months (around 8/27/2024) for Follow up with Dr Cortez.    Patient was given instructions and counseling regarding her condition or for health maintenance advice. Please see specific information pulled into the AVS if appropriate.     Stefania Molina  reports that she quit smoking about 32 years ago. Her smoking use included cigarettes. She has never used smokeless tobacco.           Latrice Allen, APRN  02/27/24  14:12 EST    Dictated Utilizing Dragon Dictation

## 2024-02-29 ENCOUNTER — TELEPHONE (OUTPATIENT)
Dept: GASTROENTEROLOGY | Facility: CLINIC | Age: 69
End: 2024-02-29
Payer: MEDICARE

## 2024-03-16 ENCOUNTER — APPOINTMENT (OUTPATIENT)
Dept: CT IMAGING | Facility: HOSPITAL | Age: 69
End: 2024-03-16
Payer: MEDICARE

## 2024-03-16 ENCOUNTER — APPOINTMENT (OUTPATIENT)
Dept: GENERAL RADIOLOGY | Facility: HOSPITAL | Age: 69
End: 2024-03-16
Payer: MEDICARE

## 2024-03-16 ENCOUNTER — HOSPITAL ENCOUNTER (INPATIENT)
Facility: HOSPITAL | Age: 69
LOS: 6 days | Discharge: HOME | End: 2024-03-22
Attending: EMERGENCY MEDICINE | Admitting: STUDENT IN AN ORGANIZED HEALTH CARE EDUCATION/TRAINING PROGRAM
Payer: MEDICARE

## 2024-03-16 DIAGNOSIS — I50.23 ACUTE ON CHRONIC HFREF (HEART FAILURE WITH REDUCED EJECTION FRACTION): ICD-10-CM

## 2024-03-16 DIAGNOSIS — R26.2 DIFFICULTY IN WALKING: ICD-10-CM

## 2024-03-16 DIAGNOSIS — I50.9 CONGESTIVE HEART FAILURE, UNSPECIFIED HF CHRONICITY, UNSPECIFIED HEART FAILURE TYPE: Primary | ICD-10-CM

## 2024-03-16 DIAGNOSIS — N18.31 STAGE 3A CHRONIC KIDNEY DISEASE: ICD-10-CM

## 2024-03-16 LAB
ALBUMIN SERPL-MCNC: 3.8 G/DL (ref 3.5–5.2)
ALBUMIN/GLOB SERPL: 1.2 G/DL
ALP SERPL-CCNC: 89 U/L (ref 39–117)
ALT SERPL W P-5'-P-CCNC: 13 U/L (ref 1–33)
ANION GAP SERPL CALCULATED.3IONS-SCNC: 12.2 MMOL/L (ref 5–15)
AST SERPL-CCNC: 16 U/L (ref 1–32)
BASOPHILS # BLD AUTO: 0.04 10*3/MM3 (ref 0–0.2)
BASOPHILS NFR BLD AUTO: 0.4 % (ref 0–1.5)
BILIRUB SERPL-MCNC: 0.5 MG/DL (ref 0–1.2)
BUN SERPL-MCNC: 27 MG/DL (ref 8–23)
BUN/CREAT SERPL: 17.5 (ref 7–25)
CALCIUM SPEC-SCNC: 9 MG/DL (ref 8.6–10.5)
CHLORIDE SERPL-SCNC: 103 MMOL/L (ref 98–107)
CHOLEST SERPL-MCNC: 152 MG/DL (ref 0–200)
CO2 SERPL-SCNC: 22.8 MMOL/L (ref 22–29)
CREAT SERPL-MCNC: 1.54 MG/DL (ref 0.57–1)
DEPRECATED RDW RBC AUTO: 43.8 FL (ref 37–54)
EGFRCR SERPLBLD CKD-EPI 2021: 36.2 ML/MIN/1.73
EOSINOPHIL # BLD AUTO: 0.24 10*3/MM3 (ref 0–0.4)
EOSINOPHIL NFR BLD AUTO: 2.7 % (ref 0.3–6.2)
ERYTHROCYTE [DISTWIDTH] IN BLOOD BY AUTOMATED COUNT: 13.3 % (ref 12.3–15.4)
FLUAV SUBTYP SPEC NAA+PROBE: NOT DETECTED
FLUBV RNA ISLT QL NAA+PROBE: NOT DETECTED
GEN 5 2HR TROPONIN T REFLEX: 92 NG/L
GLOBULIN UR ELPH-MCNC: 3.1 GM/DL
GLUCOSE BLDC GLUCOMTR-MCNC: 177 MG/DL (ref 70–99)
GLUCOSE SERPL-MCNC: 106 MG/DL (ref 65–99)
HCT VFR BLD AUTO: 34.5 % (ref 34–46.6)
HDLC SERPL-MCNC: 41 MG/DL (ref 40–60)
HGB BLD-MCNC: 11.1 G/DL (ref 12–15.9)
HOLD SPECIMEN: NORMAL
HOLD SPECIMEN: NORMAL
IMM GRANULOCYTES # BLD AUTO: 0.02 10*3/MM3 (ref 0–0.05)
IMM GRANULOCYTES NFR BLD AUTO: 0.2 % (ref 0–0.5)
LDLC SERPL CALC-MCNC: 92 MG/DL (ref 0–100)
LDLC/HDLC SERPL: 2.2 {RATIO}
LYMPHOCYTES # BLD AUTO: 1.27 10*3/MM3 (ref 0.7–3.1)
LYMPHOCYTES NFR BLD AUTO: 14 % (ref 19.6–45.3)
MCH RBC QN AUTO: 29.5 PG (ref 26.6–33)
MCHC RBC AUTO-ENTMCNC: 32.2 G/DL (ref 31.5–35.7)
MCV RBC AUTO: 91.8 FL (ref 79–97)
MONOCYTES # BLD AUTO: 0.53 10*3/MM3 (ref 0.1–0.9)
MONOCYTES NFR BLD AUTO: 5.9 % (ref 5–12)
NEUTROPHILS NFR BLD AUTO: 6.94 10*3/MM3 (ref 1.7–7)
NEUTROPHILS NFR BLD AUTO: 76.8 % (ref 42.7–76)
NRBC BLD AUTO-RTO: 0 /100 WBC (ref 0–0.2)
NT-PROBNP SERPL-MCNC: ABNORMAL PG/ML (ref 0–900)
PLATELET # BLD AUTO: 188 10*3/MM3 (ref 140–450)
PMV BLD AUTO: 10 FL (ref 6–12)
POTASSIUM SERPL-SCNC: 4.1 MMOL/L (ref 3.5–5.2)
PROT SERPL-MCNC: 6.9 G/DL (ref 6–8.5)
QT INTERVAL: 405 MS
QTC INTERVAL: 481 MS
RBC # BLD AUTO: 3.76 10*6/MM3 (ref 3.77–5.28)
RSV RNA NPH QL NAA+NON-PROBE: NOT DETECTED
SARS-COV-2 RNA RESP QL NAA+PROBE: NOT DETECTED
SODIUM SERPL-SCNC: 138 MMOL/L (ref 136–145)
TRIGL SERPL-MCNC: 104 MG/DL (ref 0–150)
TROPONIN T DELTA: -14 NG/L
TROPONIN T SERPL HS-MCNC: 106 NG/L
VLDLC SERPL-MCNC: 19 MG/DL (ref 5–40)
WBC NRBC COR # BLD AUTO: 9.04 10*3/MM3 (ref 3.4–10.8)
WHOLE BLOOD HOLD COAG: NORMAL
WHOLE BLOOD HOLD SPECIMEN: NORMAL

## 2024-03-16 PROCEDURE — 25510000001 IOPAMIDOL PER 1 ML: Performed by: EMERGENCY MEDICINE

## 2024-03-16 PROCEDURE — 71045 X-RAY EXAM CHEST 1 VIEW: CPT

## 2024-03-16 PROCEDURE — 25010000002 HEPARIN (PORCINE) PER 1000 UNITS: Performed by: STUDENT IN AN ORGANIZED HEALTH CARE EDUCATION/TRAINING PROGRAM

## 2024-03-16 PROCEDURE — 36415 COLL VENOUS BLD VENIPUNCTURE: CPT

## 2024-03-16 PROCEDURE — 71260 CT THORAX DX C+: CPT

## 2024-03-16 PROCEDURE — 25010000002 METHYLPREDNISOLONE PER 125 MG: Performed by: EMERGENCY MEDICINE

## 2024-03-16 PROCEDURE — 99223 1ST HOSP IP/OBS HIGH 75: CPT | Performed by: STUDENT IN AN ORGANIZED HEALTH CARE EDUCATION/TRAINING PROGRAM

## 2024-03-16 PROCEDURE — 82948 REAGENT STRIP/BLOOD GLUCOSE: CPT

## 2024-03-16 PROCEDURE — 83880 ASSAY OF NATRIURETIC PEPTIDE: CPT | Performed by: EMERGENCY MEDICINE

## 2024-03-16 PROCEDURE — 80061 LIPID PANEL: CPT | Performed by: STUDENT IN AN ORGANIZED HEALTH CARE EDUCATION/TRAINING PROGRAM

## 2024-03-16 PROCEDURE — 94799 UNLISTED PULMONARY SVC/PX: CPT

## 2024-03-16 PROCEDURE — 84484 ASSAY OF TROPONIN QUANT: CPT | Performed by: EMERGENCY MEDICINE

## 2024-03-16 PROCEDURE — 82948 REAGENT STRIP/BLOOD GLUCOSE: CPT | Performed by: STUDENT IN AN ORGANIZED HEALTH CARE EDUCATION/TRAINING PROGRAM

## 2024-03-16 PROCEDURE — 80053 COMPREHEN METABOLIC PANEL: CPT | Performed by: EMERGENCY MEDICINE

## 2024-03-16 PROCEDURE — 85025 COMPLETE CBC W/AUTO DIFF WBC: CPT | Performed by: EMERGENCY MEDICINE

## 2024-03-16 PROCEDURE — 94761 N-INVAS EAR/PLS OXIMETRY MLT: CPT

## 2024-03-16 PROCEDURE — 25010000002 FUROSEMIDE PER 20 MG: Performed by: EMERGENCY MEDICINE

## 2024-03-16 PROCEDURE — 94640 AIRWAY INHALATION TREATMENT: CPT

## 2024-03-16 PROCEDURE — 63710000001 INSULIN LISPRO (HUMAN) PER 5 UNITS: Performed by: STUDENT IN AN ORGANIZED HEALTH CARE EDUCATION/TRAINING PROGRAM

## 2024-03-16 PROCEDURE — 93005 ELECTROCARDIOGRAM TRACING: CPT | Performed by: EMERGENCY MEDICINE

## 2024-03-16 PROCEDURE — 99291 CRITICAL CARE FIRST HOUR: CPT

## 2024-03-16 PROCEDURE — 87637 SARSCOV2&INF A&B&RSV AMP PRB: CPT | Performed by: EMERGENCY MEDICINE

## 2024-03-16 RX ORDER — ATORVASTATIN CALCIUM 40 MG/1
40 TABLET, FILM COATED ORAL NIGHTLY
Status: DISCONTINUED | OUTPATIENT
Start: 2024-03-16 | End: 2024-03-22 | Stop reason: HOSPADM

## 2024-03-16 RX ORDER — NICOTINE POLACRILEX 4 MG
15 LOZENGE BUCCAL
Status: DISCONTINUED | OUTPATIENT
Start: 2024-03-16 | End: 2024-03-22 | Stop reason: HOSPADM

## 2024-03-16 RX ORDER — FUROSEMIDE 10 MG/ML
40 INJECTION INTRAMUSCULAR; INTRAVENOUS ONCE
Status: COMPLETED | OUTPATIENT
Start: 2024-03-16 | End: 2024-03-16

## 2024-03-16 RX ORDER — ROPINIROLE 1 MG/1
2 TABLET, FILM COATED ORAL NIGHTLY
COMMUNITY

## 2024-03-16 RX ORDER — ALBUTEROL SULFATE 2.5 MG/3ML
2.5 SOLUTION RESPIRATORY (INHALATION) EVERY 6 HOURS PRN
Status: DISCONTINUED | OUTPATIENT
Start: 2024-03-16 | End: 2024-03-22 | Stop reason: HOSPADM

## 2024-03-16 RX ORDER — INSULIN LISPRO 100 [IU]/ML
2-7 INJECTION, SOLUTION INTRAVENOUS; SUBCUTANEOUS
Status: DISCONTINUED | OUTPATIENT
Start: 2024-03-16 | End: 2024-03-22 | Stop reason: HOSPADM

## 2024-03-16 RX ORDER — FUROSEMIDE 10 MG/ML
40 INJECTION INTRAMUSCULAR; INTRAVENOUS 2 TIMES DAILY WITH MEALS
Status: DISCONTINUED | OUTPATIENT
Start: 2024-03-17 | End: 2024-03-17

## 2024-03-16 RX ORDER — TRAZODONE HYDROCHLORIDE 100 MG/1
100 TABLET ORAL NIGHTLY
Status: DISCONTINUED | OUTPATIENT
Start: 2024-03-16 | End: 2024-03-22 | Stop reason: HOSPADM

## 2024-03-16 RX ORDER — ACETAMINOPHEN 325 MG/1
650 TABLET ORAL EVERY 4 HOURS PRN
Status: DISCONTINUED | OUTPATIENT
Start: 2024-03-16 | End: 2024-03-22 | Stop reason: HOSPADM

## 2024-03-16 RX ORDER — NITROGLYCERIN 0.4 MG/1
0.4 TABLET SUBLINGUAL
Status: DISCONTINUED | OUTPATIENT
Start: 2024-03-16 | End: 2024-03-22 | Stop reason: HOSPADM

## 2024-03-16 RX ORDER — AMOXICILLIN 250 MG
2 CAPSULE ORAL 2 TIMES DAILY PRN
Status: DISCONTINUED | OUTPATIENT
Start: 2024-03-16 | End: 2024-03-22 | Stop reason: HOSPADM

## 2024-03-16 RX ORDER — ACETAMINOPHEN 650 MG/1
650 SUPPOSITORY RECTAL EVERY 4 HOURS PRN
Status: DISCONTINUED | OUTPATIENT
Start: 2024-03-16 | End: 2024-03-22 | Stop reason: HOSPADM

## 2024-03-16 RX ORDER — POLYETHYLENE GLYCOL 3350 17 G/17G
17 POWDER, FOR SOLUTION ORAL DAILY PRN
Status: DISCONTINUED | OUTPATIENT
Start: 2024-03-16 | End: 2024-03-22 | Stop reason: HOSPADM

## 2024-03-16 RX ORDER — IBUPROFEN 600 MG/1
1 TABLET ORAL
Status: DISCONTINUED | OUTPATIENT
Start: 2024-03-16 | End: 2024-03-22 | Stop reason: HOSPADM

## 2024-03-16 RX ORDER — SODIUM CHLORIDE 9 MG/ML
40 INJECTION, SOLUTION INTRAVENOUS AS NEEDED
Status: DISCONTINUED | OUTPATIENT
Start: 2024-03-16 | End: 2024-03-22 | Stop reason: HOSPADM

## 2024-03-16 RX ORDER — BISACODYL 5 MG/1
5 TABLET, DELAYED RELEASE ORAL DAILY PRN
Status: DISCONTINUED | OUTPATIENT
Start: 2024-03-16 | End: 2024-03-22 | Stop reason: HOSPADM

## 2024-03-16 RX ORDER — IPRATROPIUM BROMIDE AND ALBUTEROL SULFATE 2.5; .5 MG/3ML; MG/3ML
3 SOLUTION RESPIRATORY (INHALATION)
Status: DISCONTINUED | OUTPATIENT
Start: 2024-03-16 | End: 2024-03-21

## 2024-03-16 RX ORDER — DICYCLOMINE HYDROCHLORIDE 10 MG/1
10 CAPSULE ORAL
Status: DISCONTINUED | OUTPATIENT
Start: 2024-03-16 | End: 2024-03-22 | Stop reason: HOSPADM

## 2024-03-16 RX ORDER — PREDNISONE 20 MG/1
40 TABLET ORAL
Status: COMPLETED | OUTPATIENT
Start: 2024-03-17 | End: 2024-03-21

## 2024-03-16 RX ORDER — HEPARIN SODIUM 5000 [USP'U]/ML
5000 INJECTION, SOLUTION INTRAVENOUS; SUBCUTANEOUS EVERY 8 HOURS SCHEDULED
Status: DISCONTINUED | OUTPATIENT
Start: 2024-03-16 | End: 2024-03-22 | Stop reason: HOSPADM

## 2024-03-16 RX ORDER — SODIUM CHLORIDE 0.9 % (FLUSH) 0.9 %
10 SYRINGE (ML) INJECTION AS NEEDED
Status: DISCONTINUED | OUTPATIENT
Start: 2024-03-16 | End: 2024-03-22 | Stop reason: HOSPADM

## 2024-03-16 RX ORDER — DEXTROSE MONOHYDRATE 25 G/50ML
25 INJECTION, SOLUTION INTRAVENOUS
Status: DISCONTINUED | OUTPATIENT
Start: 2024-03-16 | End: 2024-03-22 | Stop reason: HOSPADM

## 2024-03-16 RX ORDER — BISACODYL 10 MG
10 SUPPOSITORY, RECTAL RECTAL DAILY PRN
Status: DISCONTINUED | OUTPATIENT
Start: 2024-03-16 | End: 2024-03-22 | Stop reason: HOSPADM

## 2024-03-16 RX ORDER — ROPINIROLE 1 MG/1
1 TABLET, FILM COATED ORAL
COMMUNITY

## 2024-03-16 RX ORDER — METHYLPREDNISOLONE SODIUM SUCCINATE 125 MG/2ML
125 INJECTION, POWDER, LYOPHILIZED, FOR SOLUTION INTRAMUSCULAR; INTRAVENOUS ONCE
Status: COMPLETED | OUTPATIENT
Start: 2024-03-16 | End: 2024-03-16

## 2024-03-16 RX ORDER — SODIUM CHLORIDE 0.9 % (FLUSH) 0.9 %
10 SYRINGE (ML) INJECTION EVERY 12 HOURS SCHEDULED
Status: DISCONTINUED | OUTPATIENT
Start: 2024-03-16 | End: 2024-03-22 | Stop reason: HOSPADM

## 2024-03-16 RX ORDER — ALBUTEROL SULFATE 2.5 MG/3ML
10 SOLUTION RESPIRATORY (INHALATION)
Status: COMPLETED | OUTPATIENT
Start: 2024-03-16 | End: 2024-03-16

## 2024-03-16 RX ORDER — ACETAMINOPHEN 160 MG/5ML
650 SOLUTION ORAL EVERY 4 HOURS PRN
Status: DISCONTINUED | OUTPATIENT
Start: 2024-03-16 | End: 2024-03-22 | Stop reason: HOSPADM

## 2024-03-16 RX ADMIN — IPRATROPIUM BROMIDE AND ALBUTEROL SULFATE 3 ML: .5; 3 SOLUTION RESPIRATORY (INHALATION) at 19:08

## 2024-03-16 RX ADMIN — TRAZODONE HYDROCHLORIDE 100 MG: 100 TABLET ORAL at 23:33

## 2024-03-16 RX ADMIN — METHYLPREDNISOLONE SODIUM SUCCINATE 125 MG: 125 INJECTION INTRAMUSCULAR; INTRAVENOUS at 10:56

## 2024-03-16 RX ADMIN — FUROSEMIDE 40 MG: 10 INJECTION, SOLUTION INTRAMUSCULAR; INTRAVENOUS at 15:26

## 2024-03-16 RX ADMIN — INSULIN LISPRO 4 UNITS: 100 INJECTION, SOLUTION INTRAVENOUS; SUBCUTANEOUS at 22:05

## 2024-03-16 RX ADMIN — IOPAMIDOL 100 ML: 755 INJECTION, SOLUTION INTRAVENOUS at 12:38

## 2024-03-16 RX ADMIN — HEPARIN SODIUM 5000 UNITS: 5000 INJECTION INTRAVENOUS; SUBCUTANEOUS at 22:01

## 2024-03-16 RX ADMIN — ALBUTEROL SULFATE 10 MG: 2.5 SOLUTION RESPIRATORY (INHALATION) at 11:22

## 2024-03-16 RX ADMIN — Medication 10 ML: at 10:59

## 2024-03-16 RX ADMIN — Medication 10 ML: at 22:02

## 2024-03-16 NOTE — PLAN OF CARE
Goal Outcome Evaluation:   Arrived with 2l/nc.  No respiratory distress noted.  Educated on core morbidities.  Care plan is ongoing.

## 2024-03-16 NOTE — CASE MANAGEMENT/SOCIAL WORK
"Discharge Planning Assessment   [facility]     Patient Name: Stefania Molina  MRN: [id number]  Today's Date: 3/16/2024    Admit Date: 3/16/2024        Discharge Needs Assessment       Row Name 03/16/24 1414       Living Environment    People in Home sibling(s);other (see comments)    Unique Family Situation 6 people living in a \"travel home\"    Current Living Arrangements other (see comments)  travel home    Potentially Unsafe Housing Conditions no electricity;no heat;no hot water;no air conditioning;no indoor plumbing    In the past 12 months has the electric, gas, oil, or water company threatened to shut off services in your home? Shut off  no utilities in travel home    Primary Care Provided by self    Provides Primary Care For no one    Family Caregiver if Needed none    Quality of Family Relationships involved       Resource/Environmental Concerns    Resource/Environmental Concerns environmental    Environment Concerns no electricity;no heat;no indoor plumbing;no permanent residence    Transportation Concerns rides, unreliable from others       Transportation Needs    In the past 12 months, has lack of transportation kept you from medical appointments or from getting medications? yes    In the past 12 months, has lack of transportation kept you from meetings, work, or from getting things needed for daily living? Yes       Food Insecurity    Within the past 12 months, you worried that your food would run out before you got the money to buy more. Sometimes    Within the past 12 months, the food you bought just didn't last and you didn't have money to get more. Sometimes       Transition Planning    Patient/Family Anticipates Transition to home with family    Patient/Family Anticipated Services at Transition none    Transportation Anticipated family or friend will provide       Discharge Needs Assessment    Readmission Within the Last 30 Days no previous admission in last 30 days    Equipment Currently Used at Home " "cane, straight;nebulizer    Concerns to be Addressed basic needs    Anticipated Changes Related to Illness none    Equipment Needed After Discharge none                   Discharge Plan    No documentation.                 Continued Care and Services - Admitted Since 3/16/2024    No active coordination exists for this encounter.          Demographic Summary       Row Name 03/16/24 1420       General Information    Arrived From home    Referral Source emergency department    Preferred Language English                   Functional Status       Row Name 03/16/24 1421       Physical Activity    On average, how many days per week do you engage in moderate to strenuous exercise (like a brisk walk)? 0 days    On average, how many minutes do you engage in exercise at this level? 0 min    Number of minutes of exercise per week 0       Functional Status, IADL    Medications independent    Meal Preparation independent    Housekeeping independent    Laundry independent    Shopping independent                   Psychosocial    No documentation.                  Abuse/Neglect       Row Name 03/16/24 1420       Personal Safety    Feels Unsafe at Home or Work/School no    Feels Threatened by Someone no    Does Anyone Try to Keep You From Having Contact with Others or Doing Things Outside Your Home? no    Physical Signs of Abuse Present no                   Legal    No documentation.                  Substance Abuse       Row Name 03/16/24 1420       Substance Use    Substance Use Status never used       AUDIT-C (Alcohol Use Disorders ID Test)    Q1: How often do you have a drink containing alcohol? Never    Q2: How many drinks containing alcohol do you have on a typical day when you are drinking? None    Q3: How often do you have six or more drinks on one occasion? Never    Audit-C Score 0                   Patient Forms    No documentation.                 Pt resides in a \"travel home\" with 6 other people. Pt has no running water, " no electricity, no heating and air. Pt does not have current services in the home. She uses a cane and a nebulizer however she cannot use the nebulizer due to the lack of electricity. She does not identify any other needs at this time regarding medical services. Pt would like to get back to [place] and get out of the living situation she is currently in. There are resource concerns in regards to her home situation, transportation concerns and financial strain.     ARCHANA Stevens

## 2024-03-16 NOTE — ED PROVIDER NOTES
Time: 3:12 PM EDT  Date of encounter:  3/16/2024  Independent Historian/Clinical History and Information was obtained by:   Patient    History is limited by: N/A    Chief Complaint: Dyspnea      History of Present Illness:  Patient is a 68 y.o. year old female who presents to the emergency department for evaluation of dyspnea.  Patient denies fever and chills.  Patient has no chest pain but reports shortness of breath with no cough.  The patient states that she has been standing mod and that is what triggered this.  Patient has no leg pain or swelling.  Patient denies abdominal pain.    HPI    Patient Care Team  Primary Care Provider: Angie Orourke APRN    Past Medical History:     Allergies   Allergen Reactions    Clopidogrel Other (See Comments)     Nosebleed    Entresto [Sacubitril-Valsartan] Other (See Comments)     hypotension     Past Medical History:   Diagnosis Date    CAD 10/18/2023    CHF (congestive heart failure)     Diabetes mellitus     Fluid retention     Gout     Hypertension     Stroke     Tachycardia      Past Surgical History:   Procedure Laterality Date    TONSILLECTOMY AND ADENOIDECTOMY       History reviewed. No pertinent family history.    Home Medications:  Prior to Admission medications    Medication Sig Start Date End Date Taking? Authorizing Provider   acyclovir (ZOVIRAX) 400 MG tablet Oral    ProviderIsamar MD   allopurinol (ZYLOPRIM) 300 MG tablet Take 1 tablet by mouth Daily. 12/15/23   Angie Orourke APRN   Coenzyme Q10 200 MG capsule Take  by mouth.    ProviderIsamar MD   dapagliflozin Propanediol (Farxiga) 10 MG tablet Take 10 mg by mouth Daily. 11/1/23   Parker Fontana MD   dicyclomine (BENTYL) 10 MG capsule Take 1 capsule by mouth 4 (Four) Times a Day Before Meals & at Bedtime As Needed for Abdominal Cramping. 11/1/23   Parker Fontana MD   furosemide (LASIX) 20 MG tablet Take 2 tablets by mouth Daily. 2/27/24   Latrice Allen APRN    metoprolol succinate XL (Toprol XL) 50 MG 24 hr tablet Take 1 tablet by mouth Daily. 12/15/23   Angie Orourke APRN   Mirabegron ER (MYRBETRIQ) 25 MG tablet sustained-release 24 hour 24 hr tablet Take 1 tablet by mouth Daily. 12/15/23   Angie Orourke APRN   Misc. Devices (BMI Digital Smart Scale) misc Use 1 each Daily. 3/13/24   Latrice Allen APRN   rOPINIRole (REQUIP) 1 MG tablet Take 1 tablet by mouth 3 (Three) Times a Day. Take 1 hour before bedtime. 23   Parker Fontana MD   spironolactone (ALDACTONE) 25 MG tablet Take 1 tablet by mouth Daily. 12/15/23   Angie Orourke APRN   traZODone (DESYREL) 100 MG tablet Take 1 tablet by mouth Every Night. 12/15/23   Angie Orourke APRN        Social History:   Social History     Tobacco Use    Smoking status: Former     Current packs/day: 0.00     Types: Cigarettes     Quit date:      Years since quittin.2    Smokeless tobacco: Never   Vaping Use    Vaping status: Some Days    Substances: Flavoring   Substance Use Topics    Alcohol use: Yes     Comment: occ    Drug use: Never         Review of Systems:  Review of Systems   Constitutional:  Negative for chills and fever.   HENT:  Negative for congestion, rhinorrhea and sore throat.    Eyes:  Negative for pain and visual disturbance.   Respiratory:  Positive for shortness of breath. Negative for apnea, cough and chest tightness.    Cardiovascular:  Positive for chest pain. Negative for palpitations.   Gastrointestinal:  Negative for abdominal pain, diarrhea, nausea and vomiting.   Genitourinary:  Negative for difficulty urinating and dysuria.   Musculoskeletal:  Negative for joint swelling and myalgias.   Skin:  Negative for color change.   Neurological:  Negative for seizures and headaches.   Psychiatric/Behavioral: Negative.     All other systems reviewed and are negative.       Physical Exam:  /74 (BP Location: Left arm, Patient Position: Sitting)   Pulse 84    "Temp 98 °F (36.7 °C) (Oral)   Resp 28   Ht 160 cm (63\")   Wt 87.5 kg (192 lb 14.4 oz)   SpO2 94%   BMI 34.17 kg/m²     Physical Exam  Vitals and nursing note reviewed.   Constitutional:       General: She is not in acute distress.     Appearance: Normal appearance. She is not toxic-appearing.   HENT:      Head: Normocephalic and atraumatic.      Jaw: There is normal jaw occlusion.   Eyes:      General: Lids are normal.      Extraocular Movements: Extraocular movements intact.      Conjunctiva/sclera: Conjunctivae normal.      Pupils: Pupils are equal, round, and reactive to light.   Cardiovascular:      Rate and Rhythm: Normal rate and regular rhythm.      Pulses: Normal pulses.      Heart sounds: Normal heart sounds.   Pulmonary:      Effort: Pulmonary effort is normal. No respiratory distress.      Breath sounds: Wheezing present. No rhonchi.   Abdominal:      General: Abdomen is flat.      Palpations: Abdomen is soft.      Tenderness: There is no abdominal tenderness. There is no guarding or rebound.   Musculoskeletal:         General: Normal range of motion.      Cervical back: Normal range of motion and neck supple.      Right lower leg: No edema.      Left lower leg: No edema.   Skin:     General: Skin is warm and dry.   Neurological:      Mental Status: She is alert and oriented to person, place, and time. Mental status is at baseline.   Psychiatric:         Mood and Affect: Mood normal.                  Procedures:  Procedures      Medical Decision Making:      Comorbidities that affect care:    Congestive heart failure    External Notes reviewed:    Hospital Discharge Summary: Patient was last seen in clinic for weakness and fatigue      The following orders were placed and all results were independently analyzed by me:  Orders Placed This Encounter   Procedures    XR Chest 1 View    CT Chest With Contrast Diagnostic    [facility] Draw    Comprehensive Metabolic Panel    BNP    Single High Sensitivity " Troponin T    CBC Auto Differential    High Sensitivity Troponin T 2Hr    NPO Diet NPO Type: Strict NPO    Undress & Gown    Continuous Pulse Oximetry    Vital Signs    Code Status and Medical Interventions:    Inpatient Hospitalist Consult    Oxygen Therapy- Nasal Cannula; Titrate 1-6 LPM Per SpO2; 90 - 95%    ECG 12 Lead ED Triage Standing Order; SOA    Insert Peripheral IV    Inpatient Admission    CBC & Differential    Green Top (Gel)    Lavender Top    Gold Top - SST    Light Blue Top       Medications Given in the Emergency Department:  Medications   sodium chloride 0.9 % flush 10 mL (10 mL Intravenous Given 3/16/24 1059)   methylPREDNISolone sodium succinate (SOLU-Medrol) injection 125 mg (125 mg Intravenous Given 3/16/24 1056)   albuterol (PROVENTIL) nebulizer solution 0.083% 2.5 mg/3mL (10 mg Nebulization Given 3/16/24 1122)   iopamidol (ISOVUE-370) 76 % injection 100 mL (100 mL Intravenous Given 3/16/24 1238)        ED Course:         Labs:    Lab Results (last 24 hours)       Procedure Component Value Units Date/Time    CBC & Differential [[id number]]  (Abnormal) Collected: 03/16/24 1041    Specimen: Blood from Arm, Right Updated: 03/16/24 1051    Narrative:      The following orders were created for panel order CBC & Differential.  Procedure                               Abnormality         Status                     ---------                               -----------         ------                     CBC Auto Differential[[id number]]        Abnormal            Final result                 Please view results for these tests on the individual orders.    Comprehensive Metabolic Panel [[id number]]  (Abnormal) Collected: 03/16/24 1041    Specimen: Blood from Arm, Right Updated: 03/16/24 1117     Glucose 106 mg/dL      BUN 27 mg/dL      Creatinine 1.54 mg/dL      Sodium 138 mmol/L      Potassium 4.1 mmol/L      Chloride 103 mmol/L      CO2 22.8 mmol/L      Calcium 9.0 mg/dL      Total Protein 6.9 g/dL       Albumin 3.8 g/dL      ALT (SGPT) 13 U/L      AST (SGOT) 16 U/L      Alkaline Phosphatase 89 U/L      Total Bilirubin 0.5 mg/dL      Globulin 3.1 gm/dL      A/G Ratio 1.2 g/dL      BUN/Creatinine Ratio 17.5     Anion Gap 12.2 mmol/L      eGFR 36.2 mL/min/1.73     Narrative:      GFR Normal >60  Chronic Kidney Disease <60  Kidney Failure <15      BNP [[id number]]  (Abnormal) Collected: 03/16/24 1041    Specimen: Blood from Arm, Right Updated: 03/16/24 1115     proBNP 16,037.0 pg/mL     Narrative:      This assay is used as an aid in the diagnosis of individuals suspected of having heart failure. It can be used as an aid in the diagnosis of acute decompensated heart failure (ADHF) in patients presenting with signs and symptoms of ADHF to the emergency department (ED). In addition, NT-proBNP of <300 pg/mL indicates ADHF is not likely.    Age Range Result Interpretation  NT-proBNP Concentration (pg/mL:      <50             Positive            >450                   Gray                 300-450                    Negative             <300    50-75           Positive            >900                  Gray                300-900                  Negative            <300      >75             Positive            >1800                  Gray                300-1800                  Negative            <300    Single High Sensitivity Troponin T [[id number]]  (Abnormal) Collected: 03/16/24 1041    Specimen: Blood from Arm, Right Updated: 03/16/24 1131     HS Troponin T 106 ng/L     Narrative:      High Sensitive Troponin T Reference Range:  <14.0 ng/L- Negative Female for AMI  <22.0 ng/L- Negative Male for AMI  >=14 - Abnormal Female indicating possible myocardial injury.  >=22 - Abnormal Male indicating possible myocardial injury.   Clinicians would have to utilize clinical acumen, EKG, Troponin, and serial changes to determine if it is an Acute Myocardial Infarction or myocardial injury due to an underlying chronic condition.          CBC Auto Differential [[id number]]  (Abnormal) Collected: 03/16/24 1041    Specimen: Blood from Arm, Right Updated: 03/16/24 1051     WBC 9.04 10*3/mm3      RBC 3.76 10*6/mm3      Hemoglobin 11.1 g/dL      Hematocrit 34.5 %      MCV 91.8 fL      MCH 29.5 pg      MCHC 32.2 g/dL      RDW 13.3 %      RDW-SD 43.8 fl      MPV 10.0 fL      Platelets 188 10*3/mm3      Neutrophil % 76.8 %      Lymphocyte % 14.0 %      Monocyte % 5.9 %      Eosinophil % 2.7 %      Basophil % 0.4 %      Immature Grans % 0.2 %      Neutrophils, Absolute 6.94 10*3/mm3      Lymphocytes, Absolute 1.27 10*3/mm3      Monocytes, Absolute 0.53 10*3/mm3      Eosinophils, Absolute 0.24 10*3/mm3      Basophils, Absolute 0.04 10*3/mm3      Immature Grans, Absolute 0.02 10*3/mm3      nRBC 0.0 /100 WBC     High Sensitivity Troponin T 2Hr [[id number]]  (Abnormal) Collected: 03/16/24 1312    Specimen: Blood from Arm, Left Updated: 03/16/24 1354     HS Troponin T 92 ng/L      Troponin T Delta -14 ng/L     Narrative:      High Sensitive Troponin T Reference Range:  <14.0 ng/L- Negative Female for AMI  <22.0 ng/L- Negative Male for AMI  >=14 - Abnormal Female indicating possible myocardial injury.  >=22 - Abnormal Male indicating possible myocardial injury.   Clinicians would have to utilize clinical acumen, EKG, Troponin, and serial changes to determine if it is an Acute Myocardial Infarction or myocardial injury due to an underlying chronic condition.                  Imaging:    CT Chest With Contrast Diagnostic    Result Date: 3/16/2024  PROCEDURE: CT CHEST W CONTRAST DIAGNOSTIC  COMPARISON:  [facility], CR, XR CHEST 1 VW, 3/16/2024, 10:50.  INDICATIONS: shortness of breath  TECHNIQUE: After obtaining the patient's consent, CT images were obtained with non-ionic intravenous contrast material.   PROTOCOL:   Pulmonary embolism imaging protocol performed    RADIATION:   DLP: 481.8mGy*cm   Automated exposure control was utilized to minimize  radiation dose. CONTRAST: 70cc Isovue 370 I.V. LABS:   eGFR: >60ml/min/1.73m2  FINDINGS:  Exam is mildly limited by respiratory motion artifact, but allowing for this limitation, no pulmonary arterial filling defects are seen to suggest pulmonary emboli.  Main pulmonary artery is nondilated.  Heart size is mildly enlarged.  There are moderate-sized bilateral pleural effusions with partial bilateral lower lobe compressive atelectasis.  There are mild scattered bilateral ground-glass opacities.  There is skin thickening of the partially included left breast with a 4.6 cm mass noted in the left breast, likely retro areolar location.  There also surrounding calcifications.  Partially included solid organs of the upper abdomen appear unremarkable for the phase of contrast enhancement.  No acute osseous abnormality is identified.        1. Allowing for the limitation of mild respiratory motion artifact, no pulmonary emboli are seen. 2. Moderate-sized bilateral pleural effusions with partial underlying bilateral lower lobe compressive atelectasis. 3. Mild scattered bilateral ground-glass opacities, which may be due to pulmonary edema or infection. 4. Abnormal appearance of the partially included left breast with skin thickening, mass, and calcifications.  Findings are concerning for malignancy or potentially post treatment changes.  There is no previous breast imaging for comparison.  Recommend correlation with the patient's history and physical exam.  If this is an unexpected finding, recommend further evaluation with outpatient bilateral diagnostic mammogram and breast ultrasound.     JINA VEGAS MD       Electronically Signed and Approved By: JINA VEGAS MD on 3/16/2024 at 12:56             XR Chest 1 View    Result Date: 3/16/2024  PROCEDURE: XR CHEST 1 VW  COMPARISON: [facility], CR, XR CHEST 1 VW, 2/23/2024, 0:30.  INDICATIONS: SOA Triage Protocol  FINDINGS:  There are new airspace opacities  in the lower lungs with partial obscuration of each hemidiaphragm.  Pneumothorax is seen.  Cardiac silhouette remains enlarged.        New airspace opacities in both lower lungs, concerning for pneumonia and/or pulmonary edema with pleural effusions.       JINA VEGAS MD       Electronically Signed and Approved By: JINA VEGAS MD on 3/16/2024 at 11:14                Differential Diagnosis and Discussion:    Dyspnea: Differential diagnosis includes but is not limited to metabolic acidosis, neurological disorders, psychogenic, asthma, pneumothorax, upper airway obstruction, COPD, pneumonia, noncardiogenic pulmonary edema, interstitial lung disease, anemia, congestive heart failure, and pulmonary embolism    All labs were reviewed and interpreted by me.  All X-rays impressions were independently interpreted by me.    MDM     The patient´s CBC that was reviewed and interpreted by me shows no abnormalities of critical concern. Of note, there is no anemia requiring a blood transfusion and the platelet count is acceptable.  The patient´s CMP that was reviewed and interpretted by me shows no abnormalities of critical concern. Of note, the patient´s sodium and potassium are acceptable. The patient´s liver enzymes are unremarkable. The patient´s renal function (creatinine) is preserved. The patient has a normal anion gap.  Troponin is 106.  [place] [address].  Chest x-ray is negative for acute infiltrate.  CT scan of the chest shows bilateral effusions.  Patient was given albuterol and Solu-Medrol in the ED.  Lasix was then ordered.    Critical Care Note: Total Critical Care time of 45 minutes. Total critical care time documented does not include time spent on separately billed procedures for services of nurses or physician assistants. I personally saw and examined the patient. I have reviewed all diagnostic interpretations and treatment plans as written. I was present for the key portions of any procedures performed and  the inclusive time noted in any critical care statement. Critical care time includes patient management by me, time spent at the patients bedside,  time to review lab and imaging results, discussing patient care, documentation in the medical record, and time spent with family or caregiver.        Patient Care Considerations:    None      Consultants/Shared Management Plan:    Case was discussed with Dr. Lambert who agrees with admission.    Social Determinants of Health:    Patient is independent, reliable, and has access to care.       Disposition and Care Coordination:    Admit:   Through independent evaluation of the patient's history, physical, and imperical data, the patient meets criteria for inpatient admission to the hospital.        Final diagnoses:   Congestive heart failure, unspecified HF chronicity, unspecified heart failure type        ED Disposition       ED Disposition   Decision to Admit    Condition   --    Comment   Level of Care: Telemetry [5]   Diagnosis: Acute decompensated heart failure [[id number]]   Certification: I Certify That Inpatient Hospital Services Are Medically Necessary For Greater Than 2 Midnights                 This medical record created using voice recognition software.             Jelly Michele MD  03/16/24 8085

## 2024-03-16 NOTE — H&P
[facility]  Date: 3/16/2024   Patient Name: Stefania Molina  : 1955  MRN: [id number]  Primary Care Physician:  Angie Orourke APRN  Date of admission: 3/16/2024    Subjective   Subjective     Chief Complaint:   Chief Complaint   Patient presents with    Shortness of Breath     Patient states that she helped her sister with a remodeling project and was sanding drywall and now is unable to breath and feels like she is getting worse.         HPI:    Stefania Molina is a 68 y.o. female with history of CAD s/p PCI(unknown vessel),  HFrEF, left bundle branch block, diabetes, gout, hypertension, CVA with left-sided weakness who presents for complaints of shortness of breath.  Reports she recently moved came back from [place]. She   has been hospitalized about 3 times over the past few months for volume overload with most recently in 2024.      She has been helping her sister with remodeling projects on the trailer and was sanding drywall.  And felt like her shortness breath was getting worse.  She reports her Lasix was recently decreased from 40 mg to 20 mg and has noted swelling in her belly as well as trouble breathing since then.  She denies any fever, chills, lightheadedness, dizziness or syncopal episodes.  She denies any chest pain. She reports she has rhinorrhea due to allergies.     She does report years ago she was told she has DCIS of the left side breast.  She sought second opinion after that but has never received chemotherapy.  She reports they told him she could live with this for long time.      On arrival to ER she is afebrile at 98, saturating 94 to 97% on 2 L nasal cannula heart rate between 81-84/min blood pressure 134/74.      Labs show WBC count of 9, hemoglobin of 11.1, proBNP of 16,000, high-sensitivity opponent of 106 followed by 92, sodium 138, potassium 4.1, BUN of 27, creatinine 1.54, potassium 4.1.  Chest x-ray was performed which showed new  airspace opacities in both lower lungs concerning for pneumonia versus pulmonary edema.      CT chest was performed, no PE but noted moderate-sized bilateral pleural effusions partially underlying bilateral lower lobe compressive atelectasis.  Mild scattered bilateral groundglass opacities.  There was also concern for malignancy from breast skin thickening noted on CT chest.    EKG sinus rhythm left bundle branch block. She doesn't have a PCP.        Personal History     Past Medical History:  Past Medical History:   Diagnosis Date    CAD 10/18/2023    CHF (congestive heart failure)     Diabetes mellitus     Fluid retention     Gout     Hypertension     Stroke     Tachycardia        Past Surgical History:  Past Surgical History:   Procedure Laterality Date    TONSILLECTOMY AND ADENOIDECTOMY         Family History:   History reviewed. No pertinent family history.    Social History:   Social History     Socioeconomic History    Marital status:    Tobacco Use    Smoking status: Former     Current packs/day: 0.00     Types: Cigarettes     Quit date:      Years since quittin.2    Smokeless tobacco: Never   Vaping Use    Vaping status: Some Days    Substances: Flavoring   Substance and Sexual Activity    Alcohol use: Yes     Comment: occ    Drug use: Never    Sexual activity: Defer       Home Medications:  BMI Digital Smart Scale, Coenzyme Q10, Mirabegron ER, acyclovir, allopurinol, dapagliflozin Propanediol, dicyclomine, furosemide, metoprolol succinate XL, rOPINIRole, spironolactone, and traZODone    Allergies:  Allergies   Allergen Reactions    Clopidogrel Other (See Comments)     Nosebleed    Entresto [Sacubitril-Valsartan] Other (See Comments)     hypotension       Review of Systems   All systems were reviewed and negative except for: Shortness of breath    Objective   Objective     Vitals:   Temp:  [98 °F (36.7 °C)] 98 °F (36.7 °C)  Heart Rate:  [81-84] 84  Resp:  [20-28] 28  BP: (134)/(74)  134/74  Flow (L/min):  [2] 2    Physical Exam    Constitutional: Awake, alert, no acute distress able to speak in full sentences without use of accessory muscle   eyes: Pupils equal   HENT: NCAT, mucous membranes dry   Neck: Supple   Respiratory: Decreased breath sounds bilateral bases with crackles.No wheezing or ronchi noted.    Cardiovascular: RRR, no murmurs, rubs, or gallops, palpable pedal pulses bilaterally warm to touch   Gastrointestinal: Positive bowel sounds, soft, nontender, nondistended   Musculoskeletal: No bilateral ankle edema, no clubbing or cyanosis to extremities   Psychiatric: Appropriate affect, cooperative   Neurologic: Oriented x 3,follows all commands.   Skin: No rashes     Result Review    Result Review:  I have personally reviewed the results from the time of this admission to 3/16/2024 15:21 EDT and agree with these findings:  [x]  Laboratory  []  Microbiology  []  Radiology  [x]  EKG/Telemetry   []  Cardiology/Vascular   []  Pathology  [x]  Old records  []  Other:      Assessment & Plan   Assessment / Plan     Assessment/Plan:   Shortness of breath likely in the setting of acute on chronic heart failure exacerbation  LBBB   NSTEMI likely type 2 from HFrEF exacerbation, denies any chest pain.   ?possible asthma exacerbation  Doubt pneumonia,  Afebrile, no WBC count.  Hold off antibiotics.  Troponin has down trended.   Will check viral panel   Albuterol PRN , Duonebs , prednisone 40 mg daily   Receiving 40 mg IV lasix in Er, will order for 40 mg IV BID   Trend weights daily, Strict I/os.   Fluid restriction  Home med rec pending   Check lipid panel   Will start atorvastatin, reports she has been taken off of aspirin due to issues with ?raynaud's and had nose bleeds with plavix     Hx of DCIS L breast (many years ago)- not on treatment   Will need further workup with diagnostic mammogram in outpatient setting     DM  SSI   POC glucose checks     Resume home medications once reconciled.      DVT prophylaxis:  Medical DVT prophylaxis orders are signed and held.          CODE STATUS:    Level Of Support Discussed With: Patient  Code Status (Patient has no pulse and is not breathing): CPR (Attempt to Resuscitate)  Medical Interventions (Patient has pulse or is breathing): Full Support      Admission Status:  I believe this patient meets inpatient  status.    Electronically signed by Sari Lambert MD, 03/16/24, 2:04 PM EDT.

## 2024-03-17 LAB
ANION GAP SERPL CALCULATED.3IONS-SCNC: 15.2 MMOL/L (ref 5–15)
BASOPHILS # BLD AUTO: 0.01 10*3/MM3 (ref 0–0.2)
BASOPHILS NFR BLD AUTO: 0.1 % (ref 0–1.5)
BUN SERPL-MCNC: 38 MG/DL (ref 8–23)
BUN/CREAT SERPL: 22.8 (ref 7–25)
CALCIUM SPEC-SCNC: 9.4 MG/DL (ref 8.6–10.5)
CHLORIDE SERPL-SCNC: 101 MMOL/L (ref 98–107)
CO2 SERPL-SCNC: 18.8 MMOL/L (ref 22–29)
CREAT SERPL-MCNC: 1.67 MG/DL (ref 0.57–1)
DEPRECATED RDW RBC AUTO: 43.9 FL (ref 37–54)
EGFRCR SERPLBLD CKD-EPI 2021: 32.8 ML/MIN/1.73
EOSINOPHIL # BLD AUTO: 0 10*3/MM3 (ref 0–0.4)
EOSINOPHIL NFR BLD AUTO: 0 % (ref 0.3–6.2)
ERYTHROCYTE [DISTWIDTH] IN BLOOD BY AUTOMATED COUNT: 13.2 % (ref 12.3–15.4)
GLUCOSE BLDC GLUCOMTR-MCNC: 174 MG/DL (ref 70–99)
GLUCOSE BLDC GLUCOMTR-MCNC: 193 MG/DL (ref 70–99)
GLUCOSE BLDC GLUCOMTR-MCNC: 250 MG/DL (ref 70–99)
GLUCOSE SERPL-MCNC: 161 MG/DL (ref 65–99)
HCT VFR BLD AUTO: 33.4 % (ref 34–46.6)
HGB BLD-MCNC: 10.5 G/DL (ref 12–15.9)
IMM GRANULOCYTES # BLD AUTO: 0.04 10*3/MM3 (ref 0–0.05)
IMM GRANULOCYTES NFR BLD AUTO: 0.3 % (ref 0–0.5)
LYMPHOCYTES # BLD AUTO: 0.53 10*3/MM3 (ref 0.7–3.1)
LYMPHOCYTES NFR BLD AUTO: 4.2 % (ref 19.6–45.3)
MAGNESIUM SERPL-MCNC: 2 MG/DL (ref 1.6–2.4)
MCH RBC QN AUTO: 28.9 PG (ref 26.6–33)
MCHC RBC AUTO-ENTMCNC: 31.4 G/DL (ref 31.5–35.7)
MCV RBC AUTO: 92 FL (ref 79–97)
MONOCYTES # BLD AUTO: 0.24 10*3/MM3 (ref 0.1–0.9)
MONOCYTES NFR BLD AUTO: 1.9 % (ref 5–12)
NEUTROPHILS NFR BLD AUTO: 11.71 10*3/MM3 (ref 1.7–7)
NEUTROPHILS NFR BLD AUTO: 93.5 % (ref 42.7–76)
NRBC BLD AUTO-RTO: 0 /100 WBC (ref 0–0.2)
PHOSPHATE SERPL-MCNC: 3.2 MG/DL (ref 2.5–4.5)
PLATELET # BLD AUTO: 182 10*3/MM3 (ref 140–450)
PMV BLD AUTO: 10.5 FL (ref 6–12)
POTASSIUM SERPL-SCNC: 4.8 MMOL/L (ref 3.5–5.2)
RBC # BLD AUTO: 3.63 10*6/MM3 (ref 3.77–5.28)
SODIUM SERPL-SCNC: 135 MMOL/L (ref 136–145)
WBC NRBC COR # BLD AUTO: 12.53 10*3/MM3 (ref 3.4–10.8)

## 2024-03-17 PROCEDURE — 94761 N-INVAS EAR/PLS OXIMETRY MLT: CPT

## 2024-03-17 PROCEDURE — 25010000002 HEPARIN (PORCINE) PER 1000 UNITS: Performed by: STUDENT IN AN ORGANIZED HEALTH CARE EDUCATION/TRAINING PROGRAM

## 2024-03-17 PROCEDURE — 94664 DEMO&/EVAL PT USE INHALER: CPT

## 2024-03-17 PROCEDURE — 82948 REAGENT STRIP/BLOOD GLUCOSE: CPT | Performed by: STUDENT IN AN ORGANIZED HEALTH CARE EDUCATION/TRAINING PROGRAM

## 2024-03-17 PROCEDURE — 94799 UNLISTED PULMONARY SVC/PX: CPT

## 2024-03-17 PROCEDURE — 83735 ASSAY OF MAGNESIUM: CPT | Performed by: STUDENT IN AN ORGANIZED HEALTH CARE EDUCATION/TRAINING PROGRAM

## 2024-03-17 PROCEDURE — 99233 SBSQ HOSP IP/OBS HIGH 50: CPT | Performed by: FAMILY MEDICINE

## 2024-03-17 PROCEDURE — 82948 REAGENT STRIP/BLOOD GLUCOSE: CPT

## 2024-03-17 PROCEDURE — 25010000002 FUROSEMIDE PER 20 MG: Performed by: FAMILY MEDICINE

## 2024-03-17 PROCEDURE — 85025 COMPLETE CBC W/AUTO DIFF WBC: CPT | Performed by: STUDENT IN AN ORGANIZED HEALTH CARE EDUCATION/TRAINING PROGRAM

## 2024-03-17 PROCEDURE — 84100 ASSAY OF PHOSPHORUS: CPT | Performed by: STUDENT IN AN ORGANIZED HEALTH CARE EDUCATION/TRAINING PROGRAM

## 2024-03-17 PROCEDURE — 25010000002 FUROSEMIDE PER 20 MG: Performed by: STUDENT IN AN ORGANIZED HEALTH CARE EDUCATION/TRAINING PROGRAM

## 2024-03-17 PROCEDURE — 80048 BASIC METABOLIC PNL TOTAL CA: CPT | Performed by: STUDENT IN AN ORGANIZED HEALTH CARE EDUCATION/TRAINING PROGRAM

## 2024-03-17 PROCEDURE — 63710000001 PREDNISONE PER 1 MG: Performed by: STUDENT IN AN ORGANIZED HEALTH CARE EDUCATION/TRAINING PROGRAM

## 2024-03-17 PROCEDURE — 63710000001 INSULIN LISPRO (HUMAN) PER 5 UNITS: Performed by: FAMILY MEDICINE

## 2024-03-17 RX ORDER — METOPROLOL SUCCINATE 25 MG/1
12.5 TABLET, EXTENDED RELEASE ORAL
Status: DISCONTINUED | OUTPATIENT
Start: 2024-03-17 | End: 2024-03-20

## 2024-03-17 RX ORDER — BUDESONIDE 0.5 MG/2ML
0.5 INHALANT ORAL
Status: DISCONTINUED | OUTPATIENT
Start: 2024-03-17 | End: 2024-03-22 | Stop reason: HOSPADM

## 2024-03-17 RX ORDER — FUROSEMIDE 10 MG/ML
40 INJECTION INTRAMUSCULAR; INTRAVENOUS 2 TIMES DAILY WITH MEALS
Status: DISCONTINUED | OUTPATIENT
Start: 2024-03-17 | End: 2024-03-19

## 2024-03-17 RX ORDER — ASPIRIN 81 MG/1
81 TABLET ORAL DAILY
Status: DISCONTINUED | OUTPATIENT
Start: 2024-03-17 | End: 2024-03-22 | Stop reason: HOSPADM

## 2024-03-17 RX ORDER — ONDANSETRON 2 MG/ML
4 INJECTION INTRAMUSCULAR; INTRAVENOUS EVERY 6 HOURS PRN
Status: DISCONTINUED | OUTPATIENT
Start: 2024-03-17 | End: 2024-03-22 | Stop reason: HOSPADM

## 2024-03-17 RX ADMIN — INSULIN LISPRO 2 UNITS: 100 INJECTION, SOLUTION INTRAVENOUS; SUBCUTANEOUS at 21:38

## 2024-03-17 RX ADMIN — Medication 10 ML: at 21:39

## 2024-03-17 RX ADMIN — IPRATROPIUM BROMIDE AND ALBUTEROL SULFATE 3 ML: .5; 3 SOLUTION RESPIRATORY (INHALATION) at 11:15

## 2024-03-17 RX ADMIN — TRAZODONE HYDROCHLORIDE 100 MG: 100 TABLET ORAL at 21:26

## 2024-03-17 RX ADMIN — PREDNISONE 40 MG: 20 TABLET ORAL at 08:03

## 2024-03-17 RX ADMIN — HEPARIN SODIUM 5000 UNITS: 5000 INJECTION INTRAVENOUS; SUBCUTANEOUS at 21:27

## 2024-03-17 RX ADMIN — FUROSEMIDE 40 MG: 10 INJECTION, SOLUTION INTRAMUSCULAR; INTRAVENOUS at 18:42

## 2024-03-17 RX ADMIN — HEPARIN SODIUM 5000 UNITS: 5000 INJECTION INTRAVENOUS; SUBCUTANEOUS at 05:56

## 2024-03-17 RX ADMIN — Medication 10 ML: at 08:04

## 2024-03-17 RX ADMIN — IPRATROPIUM BROMIDE AND ALBUTEROL SULFATE 3 ML: .5; 3 SOLUTION RESPIRATORY (INHALATION) at 07:23

## 2024-03-17 RX ADMIN — METOPROLOL SUCCINATE 12.5 MG: 25 TABLET, EXTENDED RELEASE ORAL at 11:31

## 2024-03-17 RX ADMIN — FUROSEMIDE 40 MG: 10 INJECTION, SOLUTION INTRAMUSCULAR; INTRAVENOUS at 08:03

## 2024-03-17 RX ADMIN — IPRATROPIUM BROMIDE AND ALBUTEROL SULFATE 3 ML: .5; 3 SOLUTION RESPIRATORY (INHALATION) at 15:16

## 2024-03-17 RX ADMIN — BUDESONIDE 0.5 MG: 0.5 INHALANT RESPIRATORY (INHALATION) at 19:13

## 2024-03-17 RX ADMIN — BUDESONIDE 0.5 MG: 0.5 INHALANT RESPIRATORY (INHALATION) at 11:14

## 2024-03-17 NOTE — PLAN OF CARE
Goal Outcome Evaluation:  Plan of Care Reviewed With: patient        Progress: improving  Outcome Evaluation: Patient has been feeling very overwhelmed and has refused medications throughout shift.  Importance explained on treatment.  Liver US npo after MN.  Care plan is ongoing.

## 2024-03-17 NOTE — PROGRESS NOTES
[facility]   Hospitalist Progress Note  Date: 3/17/2024  Patient Name: Stefania Molina  : 1955  MRN: [id number]  Date of admission: 3/16/2024      Subjective   Subjective     Chief complaint: Shortness of breath    Summary:  68-year-old female with hepatitis C, CAD status post PCI, nonischemic cardiomyopathy, heart failure reduced ejection fraction, with documented intolerance to Entresto, previously refused to take aspirin and statin, left bundle branch block, diabetes mellitus, gout, hypertension, history of DCIS left breast, untreated, being followed as outpatient, history of CVA with left-sided weakness, hospitalized on 3/16/2024 for chief complaint of shortness of breath, with recent environmental exposures while remodeling a trailer, with recent changes to her diuretics, presenting symptoms with NSTEMI likely related to heart acute decompensation of chronic heart failure, placed on scheduled diuretics, echo pending, with superimposed questionable asthma exacerbation due to environmental exposures    Interval follow-up: Seen and examined this morning, no acute distress, no acute major new events, still short of air requiring supplemental oxygen with baseline no oxygen requirement, on 2 L.  Continues to maintain concern for decreased urine output.  Creatinine close to previous 1.67 this morning, bicarb 18, potassium 4.8, sodium 135, downtrending troponin.  Echo pending.  White blood cell count 12,000.  Telemetry reviewed no acute major rhythmic events.  Denies chest pain, palpitations.  Recent echo from 2024, diffuse hypokinesis of the left ventricle EF in the 30-40 range.     Review of systems:  All systems reviewed and negative except for weakness, fatigue, cough, shortness of breath    Objective   Objective     Vitals:   Temp:  [97.3 °F (36.3 °C)-98.2 °F (36.8 °C)] 97.3 °F (36.3 °C)  Heart Rate:  [81-99] 91  Resp:  [16-28] 18  BP: (123-148)/(56-94) 128/56  Flow (L/min):  [2] 2  Physical  Exam      Constitutional: Awake, alert, no acute distress sitting at the edge of the bed on 2 L   Eyes: Pupils equal, sclerae anicteric, no conjunctival injection   HENT: NCAT, mucous membranes moist   Neck: Supple, no thyromegaly, no lymphadenopathy, trachea midline   Respiratory: Diminished breath sounds with scattered rhonchi   Cardiovascular: RRR, no murmurs, rubs, or gallops, palpable pedal pulses bilaterally   Gastrointestinal: Positive bowel sounds, soft, nontender, nondistended   Musculoskeletal: Positive bilateral ankle edema, no clubbing or cyanosis to extremities   Psychiatric: Appropriate affect, cooperative   Neurologic: Oriented x 3, strength symmetric in all extremities, Cranial Nerves grossly intact to confrontation, speech clear   Skin: No rashes visible on exposed skin    Result Review    Result Review:  I have personally reviewed the pertinent results from the past 24 hours to 3/17/2024 10:10 EDT and agree with these findings:  [x]  Laboratory   CBC          2/22/2024    22:01 3/16/2024    10:41 3/17/2024    05:04   CBC   WBC 7.99  9.04  12.53    RBC 3.76  3.76  3.63    Hemoglobin 10.9  11.1  10.5    Hematocrit 33.4  34.5  33.4    MCV 88.8  91.8  92.0    MCH 29.0  29.5  28.9    MCHC 32.6  32.2  31.4    RDW 12.7  13.3  13.2    Platelets 164  188  182      BMP          2/22/2024    22:01 3/16/2024    10:41 3/17/2024    05:04   BMP   BUN 55  27  38    Creatinine 1.91  1.54  1.67    Sodium 135  138  135    Potassium 4.5  4.1  4.8    Chloride 99  103  101    CO2 23.0  22.8  18.8    Calcium 9.4  9.0  9.4      LIVER FUNCTION TESTS:      Lab 03/16/24  1041   TOTAL PROTEIN 6.9   ALBUMIN 3.8   GLOBULIN 3.1   ALT (SGPT) 13   AST (SGOT) 16   BILIRUBIN 0.5   ALK PHOS 89       [x]  Microbiology   Microbiology Results (last 10 days)       Procedure Component Value - Date/Time    COVID PRE-OP / PRE-PROCEDURE SCREENING ORDER (NO ISOLATION) - Swab, Nasopharynx [[id number]]  (Normal) Collected: 03/16/24 1612    Lab  Status: Final result Specimen: Swab from Nasopharynx Updated: 03/16/24 1658    Narrative:      The following orders were created for panel order COVID PRE-OP / PRE-PROCEDURE SCREENING ORDER (NO ISOLATION) - Swab, Nasopharynx.  Procedure                               Abnormality         Status                     ---------                               -----------         ------                     COVID-19, FLU A/B, RSV P...[[id number]]  Normal              Final result                 Please view results for these tests on the individual orders.    COVID-19, FLU A/B, RSV PCR 1 HR TAT - Swab, Nasopharynx [[id number]]  (Normal) Collected: 03/16/24 1612    Lab Status: Final result Specimen: Swab from Nasopharynx Updated: 03/16/24 1658     COVID19 Not Detected     Influenza A PCR Not Detected     Influenza B PCR Not Detected     RSV, PCR Not Detected    Narrative:      Fact sheet for providers: https://www.fda.gov/media/324988/download    Fact sheet for patients: https://www.fda.gov/media/810053/download    Test performed by PCR.              [x]  Radiology CT Chest With Contrast Diagnostic    Result Date: 3/16/2024    1. Allowing for the limitation of mild respiratory motion artifact, no pulmonary emboli are seen. 2. Moderate-sized bilateral pleural effusions with partial underlying bilateral lower lobe compressive atelectasis. 3. Mild scattered bilateral ground-glass opacities, which may be due to pulmonary edema or infection. 4. Abnormal appearance of the partially included left breast with skin thickening, mass, and calcifications.  Findings are concerning for malignancy or potentially post treatment changes.  There is no previous breast imaging for comparison.  Recommend correlation with the patient's history and physical exam.  If this is an unexpected finding, recommend further evaluation with outpatient bilateral diagnostic mammogram and breast ultrasound.     JINA VEGAS MD       Electronically Signed and  Approved By: JINA VEGAS MD on 3/16/2024 at 12:56             XR Chest 1 View    Result Date: 3/16/2024    New airspace opacities in both lower lungs, concerning for pneumonia and/or pulmonary edema with pleural effusions.       JINA VEGAS MD       Electronically Signed and Approved By: JINA VEGAS MD on 3/16/2024 at 11:14                [x]  EKG/Telemetry   ECG 12 Lead ED Triage Standing Order; SOA   Final Result   HEART RATE= 85  bpm   RR Interval= 708  ms   WV Interval= 157  ms   P Horizontal Axis=   deg   P Front Axis= 61  deg   QRSD Interval= 129  ms   QT Interval= 405  ms   QTcB= 481  ms   QRS Axis= -23  deg   T Wave Axis= 30  deg   - ABNORMAL ECG -   Sinus rhythm   Probable left atrial enlargement   Left bundle branch block   When compared with ECG of 22-Feb-2024 20:40:31,   No significant change   Electronically Signed By: Jarod Yeh ([facility]) 16-Mar-2024 21:16:12   Date and Time of Study: 2024-03-16 10:39:58                [x]  Cardiology/Vascular   []  Pathology  [x]  Old records  []  Other:    Assessment & Plan   Assessment / Plan     Assessment/Plan:  Assessment:  Acute decompensation of chronic systolic heart failure  Left bundle branch block  CKD stage IIIa  Bilateral pleural effusions  NSTEMI due to decompensation of heart failure  Questionable asthma exacerbation due to environmental exposures  Left breast DCIS; untreated, needs follow-up  Hep C positive antibody  CAD with history of PCI  Nonischemic cardiomyopathy  Heart failure reduced ejection fraction  Previously intolerant to Entresto due to hypotension  Previously off aspirin and statin due to personal choices  Diabetes mellitus  Gout  Essential hypertension    Plan:  Labs and imaging reviewed  Schedule Lasix 40 mg IV twice daily  Repeat limited echo with new NSTEMI and decompensated heart failure, looking for regional wall motion abnormality  Closely monitor creatinine during aggressive diuresis face; with underlying  CKD  Continue prednisone 40 mg daily  Start Pulmicort nebs twice daily  Recent echo reviewed; needs follow-up with the heart failure clinic  Start aspirin 81 mg daily  Continue atorvastatin 40 mg nightly  Start metoprolol 12.5 mg daily  Monitor on telemetry  May need nephrology consultation  May need pulmonary consultation if she does not diurese with ongoing pleural effusions  Strict I's and O's  Daily weights  Follow-up liver ultrasound  Resume Jardiance 10 mg daily  A.m. labs  Full code  DVT prophylaxis with heparin  Clinical course dictate further management  Discussed with nurse at bedside    DVT prophylaxis:  Medical DVT prophylaxis orders are present.        CODE STATUS:   Level Of Support Discussed With: Patient  Code Status (Patient has no pulse and is not breathing): CPR (Attempt to Resuscitate)  Medical Interventions (Patient has pulse or is breathing): Full Support      Electronically signed by Donald Clark MD, 03/17/24, 10:12 AM EDT.  Portions of this documentation were transcribed electronically from a voice recognition software.  I confirm all data accurately represents the service(s) I performed at today's visit.

## 2024-03-18 ENCOUNTER — APPOINTMENT (OUTPATIENT)
Dept: CARDIOLOGY | Facility: HOSPITAL | Age: 69
End: 2024-03-18
Payer: MEDICARE

## 2024-03-18 ENCOUNTER — APPOINTMENT (OUTPATIENT)
Dept: ULTRASOUND IMAGING | Facility: HOSPITAL | Age: 69
End: 2024-03-18
Payer: MEDICARE

## 2024-03-18 LAB
ALBUMIN SERPL-MCNC: 3.8 G/DL (ref 3.5–5.2)
ALP SERPL-CCNC: 77 U/L (ref 39–117)
ALT SERPL W P-5'-P-CCNC: 13 U/L (ref 1–33)
ANION GAP SERPL CALCULATED.3IONS-SCNC: 13.8 MMOL/L (ref 5–15)
ASCENDING AORTA: 3.3 CM
AST SERPL-CCNC: 20 U/L (ref 1–32)
BACTERIA UR QL AUTO: ABNORMAL /HPF
BASOPHILS # BLD AUTO: 0.01 10*3/MM3 (ref 0–0.2)
BASOPHILS NFR BLD AUTO: 0.1 % (ref 0–1.5)
BH CV ECHO MEAS - AI P1/2T: 152.2 MSEC
BH CV ECHO MEAS - AO MAX PG: 5.9 MMHG
BH CV ECHO MEAS - AO MEAN PG: 3 MMHG
BH CV ECHO MEAS - AO ROOT DIAM: 3 CM
BH CV ECHO MEAS - AO V2 MAX: 121 CM/SEC
BH CV ECHO MEAS - AO V2 VTI: 22.1 CM
BH CV ECHO MEAS - AVA(I,D): 1.95 CM2
BH CV ECHO MEAS - EDV(CUBED): 161 ML
BH CV ECHO MEAS - EDV(MOD-SP2): 146 ML
BH CV ECHO MEAS - EDV(MOD-SP4): 110 ML
BH CV ECHO MEAS - EF(MOD-BP): 27.9 %
BH CV ECHO MEAS - EF(MOD-SP2): 23.3 %
BH CV ECHO MEAS - EF(MOD-SP4): 31 %
BH CV ECHO MEAS - ESV(CUBED): 91.7 ML
BH CV ECHO MEAS - ESV(MOD-SP2): 112 ML
BH CV ECHO MEAS - ESV(MOD-SP4): 75.9 ML
BH CV ECHO MEAS - FS: 17.1 %
BH CV ECHO MEAS - IVS/LVPW: 1.08 CM
BH CV ECHO MEAS - IVSD: 1.29 CM
BH CV ECHO MEAS - LA DIMENSION: 4.8 CM
BH CV ECHO MEAS - LAT PEAK E' VEL: 5.4 CM/SEC
BH CV ECHO MEAS - LV MASS(C)D: 281.6 GRAMS
BH CV ECHO MEAS - LV MAX PG: 2.26 MMHG
BH CV ECHO MEAS - LV MEAN PG: 1 MMHG
BH CV ECHO MEAS - LV V1 MAX: 75.2 CM/SEC
BH CV ECHO MEAS - LV V1 VTI: 13.7 CM
BH CV ECHO MEAS - LVIDD: 5.4 CM
BH CV ECHO MEAS - LVIDS: 4.5 CM
BH CV ECHO MEAS - LVOT AREA: 3.1 CM2
BH CV ECHO MEAS - LVOT DIAM: 2 CM
BH CV ECHO MEAS - LVPWD: 1.2 CM
BH CV ECHO MEAS - MED PEAK E' VEL: 6 CM/SEC
BH CV ECHO MEAS - MR MAX PG: 99.2 MMHG
BH CV ECHO MEAS - MR MAX VEL: 498 CM/SEC
BH CV ECHO MEAS - MR MEAN PG: 65 MMHG
BH CV ECHO MEAS - MR MEAN VEL: 382 CM/SEC
BH CV ECHO MEAS - MR VTI: 163 CM
BH CV ECHO MEAS - MV A MAX VEL: 126 CM/SEC
BH CV ECHO MEAS - MV DEC SLOPE: 954 CM/SEC2
BH CV ECHO MEAS - MV DEC TIME: 0.15 SEC
BH CV ECHO MEAS - MV E MAX VEL: 142 CM/SEC
BH CV ECHO MEAS - MV E/A: 1.13
BH CV ECHO MEAS - MV MAX PG: 9 MMHG
BH CV ECHO MEAS - MV MEAN PG: 4 MMHG
BH CV ECHO MEAS - MV V2 VTI: 31.6 CM
BH CV ECHO MEAS - MVA(VTI): 1.36 CM2
BH CV ECHO MEAS - RAP SYSTOLE: 15 MMHG
BH CV ECHO MEAS - RVDD: 2.7 CM
BH CV ECHO MEAS - RVSP: 55.2 MMHG
BH CV ECHO MEAS - SV(LVOT): 43 ML
BH CV ECHO MEAS - SV(MOD-SP2): 34 ML
BH CV ECHO MEAS - SV(MOD-SP4): 34.1 ML
BH CV ECHO MEAS - TR MAX PG: 40.2 MMHG
BH CV ECHO MEAS - TR MAX VEL: 317 CM/SEC
BH CV ECHO MEASUREMENTS AVERAGE E/E' RATIO: 24.91
BILIRUB CONJ SERPL-MCNC: <0.2 MG/DL (ref 0–0.3)
BILIRUB INDIRECT SERPL-MCNC: NORMAL MG/DL
BILIRUB SERPL-MCNC: 0.2 MG/DL (ref 0–1.2)
BILIRUB UR QL STRIP: NEGATIVE
BUN SERPL-MCNC: 62 MG/DL (ref 8–23)
BUN/CREAT SERPL: 27.2 (ref 7–25)
CALCIUM SPEC-SCNC: 9.2 MG/DL (ref 8.6–10.5)
CHLORIDE SERPL-SCNC: 100 MMOL/L (ref 98–107)
CLARITY UR: CLEAR
CO2 SERPL-SCNC: 23.2 MMOL/L (ref 22–29)
COLOR UR: YELLOW
CREAT SERPL-MCNC: 2.28 MG/DL (ref 0.57–1)
DEPRECATED RDW RBC AUTO: 44 FL (ref 37–54)
EGFRCR SERPLBLD CKD-EPI 2021: 22.6 ML/MIN/1.73
EOSINOPHIL # BLD AUTO: 0 10*3/MM3 (ref 0–0.4)
EOSINOPHIL NFR BLD AUTO: 0 % (ref 0.3–6.2)
ERYTHROCYTE [DISTWIDTH] IN BLOOD BY AUTOMATED COUNT: 13.5 % (ref 12.3–15.4)
GLUCOSE BLDC GLUCOMTR-MCNC: 116 MG/DL (ref 70–99)
GLUCOSE BLDC GLUCOMTR-MCNC: 135 MG/DL (ref 70–99)
GLUCOSE BLDC GLUCOMTR-MCNC: 142 MG/DL (ref 70–99)
GLUCOSE BLDC GLUCOMTR-MCNC: 219 MG/DL (ref 70–99)
GLUCOSE SERPL-MCNC: 144 MG/DL (ref 65–99)
GLUCOSE UR STRIP-MCNC: NEGATIVE MG/DL
HCT VFR BLD AUTO: 30.8 % (ref 34–46.6)
HGB BLD-MCNC: 10 G/DL (ref 12–15.9)
HGB UR QL STRIP.AUTO: NEGATIVE
HYALINE CASTS UR QL AUTO: ABNORMAL /LPF
IMM GRANULOCYTES # BLD AUTO: 0.06 10*3/MM3 (ref 0–0.05)
IMM GRANULOCYTES NFR BLD AUTO: 0.5 % (ref 0–0.5)
KETONES UR QL STRIP: NEGATIVE
LEFT ATRIUM VOLUME INDEX: 42.8 ML/M2
LEUKOCYTE ESTERASE UR QL STRIP.AUTO: ABNORMAL
LYMPHOCYTES # BLD AUTO: 0.83 10*3/MM3 (ref 0.7–3.1)
LYMPHOCYTES NFR BLD AUTO: 6.6 % (ref 19.6–45.3)
MAGNESIUM SERPL-MCNC: 2.1 MG/DL (ref 1.6–2.4)
MCH RBC QN AUTO: 29.6 PG (ref 26.6–33)
MCHC RBC AUTO-ENTMCNC: 32.5 G/DL (ref 31.5–35.7)
MCV RBC AUTO: 91.1 FL (ref 79–97)
MONOCYTES # BLD AUTO: 0.53 10*3/MM3 (ref 0.1–0.9)
MONOCYTES NFR BLD AUTO: 4.2 % (ref 5–12)
NEUTROPHILS NFR BLD AUTO: 11.2 10*3/MM3 (ref 1.7–7)
NEUTROPHILS NFR BLD AUTO: 88.6 % (ref 42.7–76)
NITRITE UR QL STRIP: NEGATIVE
NRBC BLD AUTO-RTO: 0 /100 WBC (ref 0–0.2)
PH UR STRIP.AUTO: 5.5 [PH] (ref 5–8)
PHOSPHATE SERPL-MCNC: 3.6 MG/DL (ref 2.5–4.5)
PLATELET # BLD AUTO: 201 10*3/MM3 (ref 140–450)
PMV BLD AUTO: 10.8 FL (ref 6–12)
POTASSIUM SERPL-SCNC: 4.9 MMOL/L (ref 3.5–5.2)
PROT SERPL-MCNC: 6.9 G/DL (ref 6–8.5)
PROT UR QL STRIP: NEGATIVE
RBC # BLD AUTO: 3.38 10*6/MM3 (ref 3.77–5.28)
RBC # UR STRIP: ABNORMAL /HPF
REF LAB TEST METHOD: ABNORMAL
SODIUM SERPL-SCNC: 137 MMOL/L (ref 136–145)
SP GR UR STRIP: 1.02 (ref 1–1.03)
SQUAMOUS #/AREA URNS HPF: ABNORMAL /HPF
URATE SERPL-MCNC: 6.7 MG/DL (ref 2.4–5.7)
UROBILINOGEN UR QL STRIP: ABNORMAL
WBC # UR STRIP: ABNORMAL /HPF
WBC NRBC COR # BLD AUTO: 12.63 10*3/MM3 (ref 3.4–10.8)

## 2024-03-18 PROCEDURE — 94761 N-INVAS EAR/PLS OXIMETRY MLT: CPT

## 2024-03-18 PROCEDURE — 76775 US EXAM ABDO BACK WALL LIM: CPT

## 2024-03-18 PROCEDURE — 83735 ASSAY OF MAGNESIUM: CPT | Performed by: FAMILY MEDICINE

## 2024-03-18 PROCEDURE — 76705 ECHO EXAM OF ABDOMEN: CPT

## 2024-03-18 PROCEDURE — 84550 ASSAY OF BLOOD/URIC ACID: CPT | Performed by: FAMILY MEDICINE

## 2024-03-18 PROCEDURE — 85025 COMPLETE CBC W/AUTO DIFF WBC: CPT | Performed by: FAMILY MEDICINE

## 2024-03-18 PROCEDURE — 94799 UNLISTED PULMONARY SVC/PX: CPT

## 2024-03-18 PROCEDURE — 94664 DEMO&/EVAL PT USE INHALER: CPT

## 2024-03-18 PROCEDURE — 63710000001 INSULIN LISPRO (HUMAN) PER 5 UNITS: Performed by: FAMILY MEDICINE

## 2024-03-18 PROCEDURE — 93306 TTE W/DOPPLER COMPLETE: CPT | Performed by: INTERNAL MEDICINE

## 2024-03-18 PROCEDURE — 82948 REAGENT STRIP/BLOOD GLUCOSE: CPT

## 2024-03-18 PROCEDURE — 81001 URINALYSIS AUTO W/SCOPE: CPT | Performed by: FAMILY MEDICINE

## 2024-03-18 PROCEDURE — 63710000001 PREDNISONE PER 1 MG: Performed by: FAMILY MEDICINE

## 2024-03-18 PROCEDURE — 99233 SBSQ HOSP IP/OBS HIGH 50: CPT | Performed by: FAMILY MEDICINE

## 2024-03-18 PROCEDURE — 80076 HEPATIC FUNCTION PANEL: CPT | Performed by: FAMILY MEDICINE

## 2024-03-18 PROCEDURE — 82948 REAGENT STRIP/BLOOD GLUCOSE: CPT | Performed by: FAMILY MEDICINE

## 2024-03-18 PROCEDURE — 84100 ASSAY OF PHOSPHORUS: CPT | Performed by: FAMILY MEDICINE

## 2024-03-18 PROCEDURE — 80048 BASIC METABOLIC PNL TOTAL CA: CPT | Performed by: FAMILY MEDICINE

## 2024-03-18 PROCEDURE — 25010000002 HEPARIN (PORCINE) PER 1000 UNITS: Performed by: STUDENT IN AN ORGANIZED HEALTH CARE EDUCATION/TRAINING PROGRAM

## 2024-03-18 PROCEDURE — 93306 TTE W/DOPPLER COMPLETE: CPT

## 2024-03-18 PROCEDURE — 87086 URINE CULTURE/COLONY COUNT: CPT | Performed by: FAMILY MEDICINE

## 2024-03-18 RX ORDER — ALLOPURINOL 100 MG/1
100 TABLET ORAL DAILY
Status: DISCONTINUED | OUTPATIENT
Start: 2024-03-18 | End: 2024-03-22 | Stop reason: HOSPADM

## 2024-03-18 RX ORDER — ROPINIROLE 1 MG/1
1 TABLET, FILM COATED ORAL EVERY MORNING
Status: DISCONTINUED | OUTPATIENT
Start: 2024-03-18 | End: 2024-03-22 | Stop reason: HOSPADM

## 2024-03-18 RX ORDER — FUROSEMIDE 40 MG/1
60 TABLET ORAL DAILY
Status: DISCONTINUED | OUTPATIENT
Start: 2024-03-18 | End: 2024-03-22 | Stop reason: HOSPADM

## 2024-03-18 RX ORDER — ROPINIROLE 1 MG/1
2 TABLET, FILM COATED ORAL NIGHTLY
Status: DISCONTINUED | OUTPATIENT
Start: 2024-03-18 | End: 2024-03-22 | Stop reason: HOSPADM

## 2024-03-18 RX ORDER — ROPINIROLE 1 MG/1
1 TABLET, FILM COATED ORAL
Status: DISCONTINUED | OUTPATIENT
Start: 2024-03-18 | End: 2024-03-22 | Stop reason: HOSPADM

## 2024-03-18 RX ADMIN — HEPARIN SODIUM 5000 UNITS: 5000 INJECTION INTRAVENOUS; SUBCUTANEOUS at 16:48

## 2024-03-18 RX ADMIN — ALLOPURINOL 100 MG: 100 TABLET ORAL at 09:31

## 2024-03-18 RX ADMIN — FUROSEMIDE 60 MG: 40 TABLET ORAL at 18:08

## 2024-03-18 RX ADMIN — TRAZODONE HYDROCHLORIDE 100 MG: 100 TABLET ORAL at 21:21

## 2024-03-18 RX ADMIN — ATORVASTATIN CALCIUM 40 MG: 40 TABLET, FILM COATED ORAL at 21:21

## 2024-03-18 RX ADMIN — HEPARIN SODIUM 5000 UNITS: 5000 INJECTION INTRAVENOUS; SUBCUTANEOUS at 21:21

## 2024-03-18 RX ADMIN — ROPINIROLE HYDROCHLORIDE 1 MG: 1 TABLET, FILM COATED ORAL at 09:32

## 2024-03-18 RX ADMIN — BUDESONIDE 0.5 MG: 0.5 INHALANT RESPIRATORY (INHALATION) at 19:02

## 2024-03-18 RX ADMIN — ROPINIROLE HYDROCHLORIDE 1 MG: 1 TABLET, FILM COATED ORAL at 11:24

## 2024-03-18 RX ADMIN — INSULIN LISPRO 3 UNITS: 100 INJECTION, SOLUTION INTRAVENOUS; SUBCUTANEOUS at 21:20

## 2024-03-18 RX ADMIN — ASPIRIN 81 MG: 81 TABLET, COATED ORAL at 08:41

## 2024-03-18 RX ADMIN — ROPINIROLE HYDROCHLORIDE 2 MG: 1 TABLET, FILM COATED ORAL at 21:21

## 2024-03-18 RX ADMIN — Medication 10 ML: at 08:42

## 2024-03-18 RX ADMIN — PREDNISONE 40 MG: 20 TABLET ORAL at 08:41

## 2024-03-18 RX ADMIN — METOPROLOL SUCCINATE 12.5 MG: 25 TABLET, EXTENDED RELEASE ORAL at 08:41

## 2024-03-18 RX ADMIN — BUDESONIDE 0.5 MG: 0.5 INHALANT RESPIRATORY (INHALATION) at 07:32

## 2024-03-18 RX ADMIN — Medication 10 ML: at 21:25

## 2024-03-18 NOTE — PLAN OF CARE
Goal Outcome Evaluation:         Patient has been compliant with care and has not refused blood sugar check, insulin or heparin during my shift. NPO after MN/ patient aware.

## 2024-03-18 NOTE — CONSULTS
[facility]   Cardiology Consult Note    Patient Name: Stefania Molina  : 1955  MRN: [id number]  Primary Care Physician:  Angie Orourke APRN  Referring Physician: No ref. provider found  Date of admission: 3/16/2024    Subjective   Subjective     Reason for Consult/ Chief Complaint: Cardiomyopathy    HPI:  Stefania Molina is a 68 y.o. female with history of coronary disease status post PCI, cardiomyopathy admitted with increased shortness of breath CHF.  Feels better today.  No chest pain.  Her diuretics were recently decreased.    Review of Systems:      Constitutional no fever,  no weight loss   Skin no rash   Otolaryngeal no difficulty swallowing   Cardiovascular See HPI   Pulmonary no cough, no sputum production   Gastrointestinal no constipation, no diarrhea   Genitourinary no dysuria, no hematuria   Hematologic no easy bruisability, no abnormal bleeding   Musculoskeletal no muscle pain   Neurologic no dizziness, no falls     Personal History       Past Medical/Surgical History:   Past Medical History:   Diagnosis Date    CAD 10/18/2023    CHF (congestive heart failure)     Diabetes mellitus     Fluid retention     Gout     Hypertension     Stroke     Tachycardia      Past Surgical History:   Procedure Laterality Date    TONSILLECTOMY AND ADENOIDECTOMY           Family History: No Family History of CAD.    Social History:  reports that she quit smoking about 32 years ago. Her smoking use included cigarettes. She has never used smokeless tobacco. She reports current alcohol use. She reports that she does not use drugs.    Medications:  Medications Prior to Admission   Medication Sig Dispense Refill Last Dose    acyclovir (ZOVIRAX) 400 MG tablet Take 1 tablet by mouth 3 (Three) Times a Day As Needed (only with outbreaks).   Patient Taking Differently    allopurinol (ZYLOPRIM) 300 MG tablet Take 1 tablet by mouth Daily. 90 tablet 2 3/16/2024    Coenzyme Q10 200 MG capsule Take 200 mg by mouth Daily.    3/16/2024    dapagliflozin Propanediol (Farxiga) 10 MG tablet Take 10 mg by mouth Daily. 90 tablet 2 3/15/2024    dicyclomine (BENTYL) 10 MG capsule Take 1 capsule by mouth 4 (Four) Times a Day Before Meals & at Bedtime As Needed for Abdominal Cramping. (Patient taking differently: Take 1 capsule by mouth 4 (Four) Times a Day Before Meals & at Bedtime As Needed for Abdominal Cramping. Patient takes this BID) 120 capsule 2 Patient Taking Differently    furosemide (LASIX) 20 MG tablet Take 2 tablets by mouth Daily. (Patient taking differently: Take 1 tablet by mouth Daily.) 90 tablet 1 3/15/2024    metoprolol succinate XL (Toprol XL) 50 MG 24 hr tablet Take 1 tablet by mouth Daily. (Patient taking differently: Take 1 tablet by mouth Every Night.) 90 tablet 2 3/15/2024    Mirabegron ER (MYRBETRIQ) 25 MG tablet sustained-release 24 hour 24 hr tablet Take 1 tablet by mouth Daily. 90 tablet 2 3/15/2024    rOPINIRole (REQUIP) 1 MG tablet Take 1 tablet by mouth 3 (Three) Times a Day. Take 1 hour before bedtime. (Patient taking differently: Take 1 tablet by mouth Every Morning. Take 1 hour before bedtime.) 270 tablet 2 3/16/2024    rOPINIRole (REQUIP) 1 MG tablet Take 1 tablet by mouth Daily With Lunch. Take 1 hour before bedtime.   Patient Taking Differently    rOPINIRole (REQUIP) 1 MG tablet Take 2 tablets by mouth Every Night. Take 1 hour before bedtime.   3/15/2024    spironolactone (ALDACTONE) 25 MG tablet Take 1 tablet by mouth Daily. 90 tablet 2 3/15/2024    traZODone (DESYREL) 100 MG tablet Take 1 tablet by mouth Every Night. 90 tablet 1 3/15/2024    Misc. Devices (BMI Digital Smart Scale) misc Use 1 each Daily. 1 each 0      Current medications:  allopurinol, 100 mg, Oral, Daily  aspirin, 81 mg, Oral, Daily  atorvastatin, 40 mg, Oral, Nightly  budesonide, 0.5 mg, Nebulization, BID - RT  [Held by provider] empagliflozin, 10 mg, Oral, Daily  [Held by provider] furosemide, 40 mg, Intravenous, BID With Meals  heparin  (porcine), 5,000 Units, Subcutaneous, Q8H  insulin lispro, 2-7 Units, Subcutaneous, 4x Daily AC & at Bedtime  ipratropium-albuterol, 3 mL, Nebulization, 4x Daily - RT  metoprolol succinate XL, 12.5 mg, Oral, Q24H  predniSONE, 40 mg, Oral, Daily With Breakfast  rOPINIRole, 1 mg, Oral, Daily With Lunch  rOPINIRole, 1 mg, Oral, QAM  rOPINIRole, 2 mg, Oral, Nightly  sodium chloride, 10 mL, Intravenous, Q12H  traZODone, 100 mg, Oral, Nightly      Current IV drips:       Allergies:  Allergies   Allergen Reactions    Clopidogrel Other (See Comments)     Nosebleed    Entresto [Sacubitril-Valsartan] Other (See Comments)     hypotension       Objective    Objective     Vitals:   Temp:  [97.2 °F (36.2 °C)-97.9 °F (36.6 °C)] 97.2 °F (36.2 °C)  Heart Rate:  [] 102  Resp:  [16-18] 16  BP: (115-151)/(49-85) 125/73  Flow (L/min):  [1] 1      Physical Exam:    Constitutional: Awake, alert, No acute distress   Eyes: PERRLA, sclerae anicteric, no conjunctival injection  HENT: NCAT, mucous membranes moist  Neck: Supple, no thyromegaly, no lymphadenopathy, trachea midline  Respiratory: Decreased to auscultation bilaterally, nonlabored respirations   Cardiovascular: RRR, no murmurs, rubs, or gallops, palpable pedal pulses bilaterally  Gastrointestinal: Positive bowel sounds, soft, nontender, nondistended      Result Review    Result Review:  I have personally reviewed the results from the time of this admission to 3/18/2024 08:54 EDT and agree with these findings:  [x]  Laboratory  [x]  EKG/Telemetry   [x]  Cardiology/Vascular   []  Pathology  [x]  Old records  [x]  Medications  Basic Metabolic Panel    Sodium Sodium   Date Value Ref Range Status   03/18/2024 137 136 - 145 mmol/L Final   03/17/2024 135 (L) 136 - 145 mmol/L Final   03/16/2024 138 136 - 145 mmol/L Final      Potassium Potassium   Date Value Ref Range Status   03/18/2024 4.9 3.5 - 5.2 mmol/L Final     Comment:     Slight hemolysis detected by analyzer. Result may be  "falsely elevated.   03/17/2024 4.8 3.5 - 5.2 mmol/L Final   03/16/2024 4.1 3.5 - 5.2 mmol/L Final      Chloride Chloride   Date Value Ref Range Status   03/18/2024 100 98 - 107 mmol/L Final   03/17/2024 101 98 - 107 mmol/L Final   03/16/2024 103 98 - 107 mmol/L Final      Bicarbonate No results found for: \"PLASMABICARB\"   BUN BUN   Date Value Ref Range Status   03/18/2024 62 (H) 8 - 23 mg/dL Final   03/17/2024 38 (H) 8 - 23 mg/dL Final   03/16/2024 27 (H) 8 - 23 mg/dL Final      Creatinine Creatinine   Date Value Ref Range Status   03/18/2024 2.28 (H) 0.57 - 1.00 mg/dL Final   03/17/2024 1.67 (H) 0.57 - 1.00 mg/dL Final   03/16/2024 1.54 (H) 0.57 - 1.00 mg/dL Final      Calcium Calcium   Date Value Ref Range Status   03/18/2024 9.2 8.6 - 10.5 mg/dL Final   03/17/2024 9.4 8.6 - 10.5 mg/dL Final   03/16/2024 9.0 8.6 - 10.5 mg/dL Final      Glucose      No components found for: \"GLUCOSE.*\"  Results for orders placed during the hospital encounter of 02/08/24    Adult Transthoracic Echo Complete W/ Cont if Necessary Per Protocol    Interpretation Summary  Fibrocalcific mitral and aortic valves.  Diffuse hypokinesis of the left ventricule and reduced left-ventricular systolic function ejection fraction around 35 to 40%.  Trace pericardial effusion.  Mild MR and mild TR.        Lab Results   Component Value Date    PROBNP 16,037.0 (H) 03/16/2024          EKG shows sinus rhythm with no acute changes.  Telemetry reviewed    Assessment / Plan     Impression:   1.  Acute on chronic systolic heart failure secondary to cardiomyopathy.  2.  Nonischemic cardiomyopathy.  3.  CORONARY ARTERY DISEASE s/p PCI.  4.  Chronic kidney disease stage III-IV.    Plan:   1.  Continue IV Lasix.  Monitor renal function.  2.  Renal consult.  3.  Repeat echocardiogram.  4.  Continue low-dose beta-blockers.  Cannot tolerate Entresto, also has chronic kidney disease.    Electronically signed by Paco Cortez MD, 03/18/24, 8:54 AM EDT.   "

## 2024-03-18 NOTE — CONSULTS
[facility]   Nephrology Consult Note      Patient Name: Stefania Molina  : 1955  MRN: [id number]  Primary Care Physician:  Angie Orourke APRN  Referring Physician: No ref. provider found  Date of admission: 3/16/2024    Subjective   Subjective     Reason for Consult/ Chief Complaint: Acute kidney injury    HPI:  Stefania Molina is a 68 y.o. female with history of diabetes, hypertension, congestive heart failure and CKD stage III who was admitted with shortness of breath.  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  She had history of asthma but has been stable until this episode.  She was thought to be in congestive heart failure and was given IV diuretics.  She felt better and her respiratory status improved but her creatinine has risen to 2.2.  She had CT scan with contrast on 3/16/2024.  Baseline creatinine from previous record may be between 1.7-1.9.  Patient stated she has been aware of abnormal kidney numbers but has not had any formal nephrology evaluation.  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  No history of kidney stones, no gross hematuria.  She does not use NSAIDs.  Her sister has prediabetes and some form of kidney disease.  No other family history of kidney disease.    Review of Systems   All systems were reviewed and negative except for: What is mentioned above.    Personal History     Past Medical History:   Diagnosis Date    CAD 10/18/2023    CHF (congestive heart failure)     Diabetes mellitus     Fluid retention     Gout     Hypertension     Stroke     Tachycardia    CKD stage III    Past Surgical History:   Procedure Laterality Date    TONSILLECTOMY AND ADENOIDECTOMY         Family History: family history is not on file. Otherwise pertinent FHx was reviewed and not pertinent to  current issue.    Social History:  reports that she quit smoking about 32 years ago. Her smoking use included cigarettes. She has never used smokeless tobacco. She reports current alcohol use. She reports that she does not use drugs.    Home Medications:  BMI Digital Smart Scale, Coenzyme Q10, Mirabegron ER, acyclovir, allopurinol, dapagliflozin Propanediol, dicyclomine, furosemide, metoprolol succinate XL, rOPINIRole, spironolactone, and traZODone    Allergies:  Allergies   Allergen Reactions    Clopidogrel Other (See Comments)     Nosebleed    Entresto [Sacubitril-Valsartan] Other (See Comments)     hypotension       Objective    Objective     Vitals:   Temp:  [97 °F (36.1 °C)-97.9 °F (36.6 °C)] 97.7 °F (36.5 °C)  Heart Rate:  [] 93  Resp:  [16-18] 18  BP: (124-151)/(56-86) 124/86  Flow (L/min):  [1] 1     Physical Exam    Constitutional: Awake, alert, no distress, conversant and pleasant   Eyes: sclerae anicteric, no conjunctival injection   HENT: mucous membranes moist   Neck: Supple, no thyromegaly, no lymphadenopathy, trachea midline, + JVD   Respiratory: Bilateral Rales, no wheezing, nonlabored respirations    Cardiovascular: RRR, no murmurs, rubs, or gallops.   Gastrointestinal: Positive bowel sounds, soft, nontender, nondistended with obesity   Musculoskeletal: Trace edema, no clubbing or cyanosis   Psychiatric: Appropriate affect, cooperative   Neurologic: Oriented x 3, moving all extremities, Cranial Nerves grossly intact, speech clear   Skin: warm and dry, no rashes     Result Review    Result Reviewed:  I have personally reviewed the results from the time of this admission to 3/18/2024 16:41 EDT and agree with these findings:  [x]  Laboratory  []  Microbiology  [x]  Radiology  []  EKG/Telemetry   []  Cardiology/Vascular   []  Pathology  [x]  Old records  []  Other:  LAB RESULTS:    LAB RESULTS:        Lab 03/18/24  0444 03/17/24  0504 03/16/24  1041   SODIUM 137 135* 138   POTASSIUM 4.9 4.8 4.1    CHLORIDE 100 101 103   CO2 23.2 18.8* 22.8   BUN 62* 38* 27*   CREATININE 2.28* 1.67* 1.54*   GLUCOSE 144* 161* 106*   EGFR 22.6* 32.8* 36.2*   ANION GAP 13.8 15.2* 12.2   MAGNESIUM 2.1 2.0  --    PHOSPHORUS 3.6 3.2  --      US Renal Bilateral    Result Date: 3/18/2024  PROCEDURE: US RENAL BILATERAL  COMPARISON: [facility], CT, CT CHEST W CONTRAST DIAGNOSTIC, 3/16/2024, 12:35.  INDICATIONS: NARGIS CKD  FINDINGS:  Right kidney measures:  10.0 x 4.3 x 4.5 cm  Left kidney measures:  8.9 x 5.1 x 3.9 cm  Kidneys demonstrate no hydronephrosis or cortical thinning.  Echogenicity is unremarkable.  No suspicious renal mass or calcification.  Small 8 mm cortical cyst noted on the right  Urinary bladder:  Bladder is obscured, nondistended       Impression:   1. Grossly unremarkable appearance of the kidneys bilaterally 2. Nonvisualization of the bladder      TAVARES REAL MD       Electronically Signed and Approved By: TAVARES REAL MD on 3/18/2024 at 14:36             US Liver    Result Date: 3/18/2024  PROCEDURE: US LIVER  COMPARISON: [facility], CT, CT CHEST W CONTRAST DIAGNOSTIC, 3/16/2024, 12:35.  INDICATIONS: history of hep c  FINDINGS:  Visualized pancreas is normal.  Main portal vein is patent with a normal hepatopetal direction of flow.  Gallbladder is partially contracted, and there is borderline thickening of the wall at 4 mm.  No gallstones.  Common duct is normal in caliber at 5 mm internal diameter.  Right kidney measures 10.1 cm in length.  It is morphologically normal and nonobstructed.  There is a small right renal cyst measuring 8 mm in size.  No liver mass.  No free fluid is seen.      Impression:   1. No gallstones or biliary obstruction.  The gallbladder is partially contracted and there is borderline thickening of the wall.  This wall thickening may be secondary to contraction rather than cholecystitis. 2. Small right renal cyst. 3. No liver mass.       ILIA RUIZ MD        Electronically Signed and Approved By: ILIA RUIZ MD on 3/18/2024 at 6:25               Most notable findings include: Creatinine up to 2.2.    Assessment & Plan   Assessment / Plan     Brief Patient Summary:  Stefania Molina is a 68 y.o. female who is admitted with worsening shortness of breath, CHF exacerbation versus asthma and has known CKD with worsening renal function.    Active Hospital Problems:  Active Hospital Problems    Diagnosis     **Acute decompensated heart failure        Assessment and Plan:   - Acute kidney injury, etiology is uncertain possibly related to diuresis versus CT contrast on 3/16/2024.  Clinically she is still mildly volume overloaded.  Will obtain urinalysis, quantify proteinuria and check urine eosinophils will start Lasix 60 mg p.o. daily.  Farxiga and spironolactone are on hold now.  - Chronic kidney disease stage IIIb with baseline creatinine 1.7-1.9 from previous records.  This is likely related to diabetic hypertensive nephropathy.  - Type 2 diabetes since 1996 with known diabetic retinopathy and neuropathy.  - Hypertension, blood pressure is acceptable.  - History of congestive heart failure, 2D echo showing reduced ejection fraction with LVH, being evaluated by cardiology.  - History of hepatitis C.  - Right renal cyst noted on imaging.  - Abnormal breast imaging, per primary.  Will follow.    Thank you very much for this consult!    Electronically signed by Giles Estrada MD, 3/18/2024, 16:41 EDT.

## 2024-03-18 NOTE — PLAN OF CARE
Problem: Adult Inpatient Plan of Care  Goal: Plan of Care Review  Outcome: Ongoing, Progressing  Goal: Patient-Specific Goal (Individualized)  Outcome: Ongoing, Progressing  Goal: Absence of Hospital-Acquired Illness or Injury  Outcome: Ongoing, Progressing  Intervention: Identify and Manage Fall Risk  Intervention: Prevent Skin Injury  Intervention: Prevent and Manage VTE (Venous Thromboembolism) Risk  Intervention: Prevent Infection  Goal: Optimal Comfort and Wellbeing  Outcome: Ongoing, Progressing  Intervention: Provide Person-Centered Care  Goal: Readiness for Transition of Care  Outcome: Ongoing, Progressing   Goal Outcome Evaluation:  Plan of Care Reviewed With: patient

## 2024-03-18 NOTE — PROGRESS NOTES
[facility]   Hospitalist Progress Note  Date: 3/18/2024  Patient Name: Stefania Molina  : 1955  MRN: [id number]  Date of admission: 3/16/2024      Subjective   Subjective     Chief complaint: Shortness of breath    Summary:  68-year-old female with hepatitis C, CAD status post PCI, nonischemic cardiomyopathy, heart failure reduced ejection fraction, with documented intolerance to Entresto, previously refused to take aspirin and statin, left bundle branch block, diabetes mellitus, gout, hypertension, history of DCIS left breast, untreated, being followed as outpatient, history of CVA with left-sided weakness, hospitalized on 3/16/2024 for chief complaint of shortness of breath, with recent environmental exposures 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  Cardiology and nephrology consulted with underlying CHF, and worsening renal function with diuresis.  Limited echo showing worsening cardiac function EF 26 to 30%.    Interval follow-up: Seen and examined this morning, no acute distress, no acute major new events, nursing staff indicate patient unhappy about feeling overwhelmed, refusing medications.  Overnight did somewhat better.  Renal ultrasound this morning.  Nephrology consulted with worsening renal function with ongoing diuresis efforts.  She indicates that her urine output has been less.  750 mL over the past 24 hours.  Creatinine up to 2.28, BUN 62, white blood cell count 12,000, potassium 4.9, urinalysis without proteinuria.  Heart failure and ongoing issue, worsening ejection fraction.  Consulted her primary cardiologist.  Discussed with Dr. Cortez.  She remains on nasal cannula  oxygen, with sats in the 90s on 1 L with room to wean down.  Telemetry reviewed, sinus rhythm in the 90s.  Few rounds of high heart rates.    Review of systems:  All systems reviewed and negative except for weakness, fatigue, cough, shortness of breath, frustration    Objective   Objective     Vitals:   Temp:  [97 °F (36.1 °C)-97.9 °F (36.6 °C)] 97.7 °F (36.5 °C)  Heart Rate:  [] 93  Resp:  [16-18] 18  BP: (124-151)/(56-86) 124/86  Flow (L/min):  [1] 1  Physical Exam    Constitutional: Awake, alert, no acute distress sitting at the edge of the bed on 2 L   Eyes: Pupils equal, sclerae anicteric, no conjunctival injection   HENT: NCAT, mucous membranes moist   Neck: Supple, full range of motion   Respiratory: Diminished breath sounds with scattered rhonchi, fine crackles at the bases   Cardiovascular: RRR, no murmurs, rubs, or gallops, palpable pedal pulses bilaterally   Gastrointestinal: Positive bowel sounds, soft, nontender, nondistended   Musculoskeletal: Trace bilateral ankle edema, no clubbing or cyanosis to extremities   Psychiatric: Appropriate affect, cooperative   Neurologic: Oriented x 3, strength symmetric in all extremities, Cranial Nerves grossly intact to confrontation, speech clear   Skin: No rashes visible on exposed skin      Result Review    Result Review:  I have personally reviewed the pertinent results from the past 24 hours to 3/18/2024 16:32 EDT and agree with these findings:  [x]  Laboratory   CBC          3/16/2024    10:41 3/17/2024    05:04 3/18/2024    04:44   CBC   WBC 9.04  12.53  12.63    RBC 3.76  3.63  3.38    Hemoglobin 11.1  10.5  10.0    Hematocrit 34.5  33.4  30.8    MCV 91.8  92.0  91.1    MCH 29.5  28.9  29.6    MCHC 32.2  31.4  32.5    RDW 13.3  13.2  13.5    Platelets 188  182  201      BMP          3/16/2024    10:41 3/17/2024    05:04 3/18/2024    04:44   BMP   BUN 27  38  62    Creatinine 1.54  1.67  2.28    Sodium 138  135  137    Potassium 4.1  4.8  4.9    Chloride  103  101  100    CO2 22.8  18.8  23.2    Calcium 9.0  9.4  9.2      LIVER FUNCTION TESTS:      Lab 03/18/24  0444 03/16/24  1041   TOTAL PROTEIN 6.9 6.9   ALBUMIN 3.8 3.8   GLOBULIN  --  3.1   ALT (SGPT) 13 13   AST (SGOT) 20 16   BILIRUBIN 0.2 0.5   BILIRUBIN DIRECT <0.2  --    ALK PHOS 77 89       [x]  Microbiology   Microbiology Results (last 10 days)       Procedure Component Value - Date/Time    COVID PRE-OP / PRE-PROCEDURE SCREENING ORDER (NO ISOLATION) - Swab, Nasopharynx [[id number]]  (Normal) Collected: 03/16/24 1612    Lab Status: Final result Specimen: Swab from Nasopharynx Updated: 03/16/24 1658    Narrative:      The following orders were created for panel order COVID PRE-OP / PRE-PROCEDURE SCREENING ORDER (NO ISOLATION) - Swab, Nasopharynx.  Procedure                               Abnormality         Status                     ---------                               -----------         ------                     COVID-19, FLU A/B, RSV P...[[id number]]  Normal              Final result                 Please view results for these tests on the individual orders.    COVID-19, FLU A/B, RSV PCR 1 HR TAT - Swab, Nasopharynx [[id number]]  (Normal) Collected: 03/16/24 1612    Lab Status: Final result Specimen: Swab from Nasopharynx Updated: 03/16/24 1658     COVID19 Not Detected     Influenza A PCR Not Detected     Influenza B PCR Not Detected     RSV, PCR Not Detected    Narrative:      Fact sheet for providers: https://www.fda.gov/media/598433/download    Fact sheet for patients: https://www.fda.gov/media/150794/download    Test performed by PCR.              [x]  Radiology US Renal Bilateral    Result Date: 3/18/2024    1. Grossly unremarkable appearance of the kidneys bilaterally 2. Nonvisualization of the bladder      TAVARES REAL MD       Electronically Signed and Approved By: TAVARES REAL MD on 3/18/2024 at 14:36             US Liver    Result Date: 3/18/2024    1. No gallstones or biliary  obstruction.  The gallbladder is partially contracted and there is borderline thickening of the wall.  This wall thickening may be secondary to contraction rather than cholecystitis. 2. Small right renal cyst. 3. No liver mass.       ILIA RUIZ MD       Electronically Signed and Approved By: ILIA RUIZ MD on 3/18/2024 at 6:25             CT Chest With Contrast Diagnostic    Result Date: 3/16/2024    1. Allowing for the limitation of mild respiratory motion artifact, no pulmonary emboli are seen. 2. Moderate-sized bilateral pleural effusions with partial underlying bilateral lower lobe compressive atelectasis. 3. Mild scattered bilateral ground-glass opacities, which may be due to pulmonary edema or infection. 4. Abnormal appearance of the partially included left breast with skin thickening, mass, and calcifications.  Findings are concerning for malignancy or potentially post treatment changes.  There is no previous breast imaging for comparison.  Recommend correlation with the patient's history and physical exam.  If this is an unexpected finding, recommend further evaluation with outpatient bilateral diagnostic mammogram and breast ultrasound.     JINA VEGAS MD       Electronically Signed and Approved By: JINA VEGAS MD on 3/16/2024 at 12:56             XR Chest 1 View    Result Date: 3/16/2024    New airspace opacities in both lower lungs, concerning for pneumonia and/or pulmonary edema with pleural effusions.       JINA VEGAS MD       Electronically Signed and Approved By: JINA VEGAS MD on 3/16/2024 at 11:14                [x]  EKG/Telemetry   ECG 12 Lead ED Triage Standing Order; SOA   Final Result   HEART RATE= 85  bpm   RR Interval= 708  ms   NJ Interval= 157  ms   P Horizontal Axis=   deg   P Front Axis= 61  deg   QRSD Interval= 129  ms   QT Interval= 405  ms   QTcB= 481  ms   QRS Axis= -23  deg   T Wave Axis= 30  deg   - ABNORMAL ECG -   Sinus rhythm   Probable left atrial  enlargement   Left bundle branch block   When compared with ECG of 22-Feb-2024 20:40:31,   No significant change   Electronically Signed By: Jarod Yeh ([facility]) 16-Mar-2024 21:16:12   Date and Time of Study: 2024-03-16 10:39:58                [x]  Cardiology/Vascular   []  Pathology  [x]  Old records  []  Other:    Assessment & Plan   Assessment / Plan     Assessment/Plan:  Assessment:  Acute decompensation of chronic systolic heart failure  Left bundle branch block  CKD stage IIIa  Bilateral pleural effusions  NSTEMI due to decompensation of heart failure  Questionable asthma exacerbation due to environmental exposures  Left breast DCIS; untreated, needs follow-up  Hep C positive antibody  CAD with history of PCI  Nonischemic cardiomyopathy  Heart failure reduced ejection fraction  Previously intolerant to Entresto due to hypotension  Previously off aspirin and statin due to personal choices  Diabetes mellitus  Gout  Essential hypertension    Plan:  Labs and imaging reviewed  Hold Lasix  Worsening renal function, consulted nephrology, discussed with Dr. Estrada,; will review patient's labs and imaging and offer further advice  Urine culture pending  Cardiology consulted discussed with Dr. Cortez, recommendations appreciated; patient cannot tolerate Entresto; the best bet is keeping her on low-dose beta-blockers  Wean oxygen per tolerance  Holding Jardiance at this time with worsening renal dysfunction  Continue prednisone 40 mg daily for 5 days total  Continue Pulmicort nebs twice daily  Needs follow-up with the heart failure clinic  Continue aspirin 81 mg daily  Continue atorvastatin 40 mg nightly  Continue metoprolol XL 12.5 mg daily  Monitor on telemetry  Strict I's and O's  Daily weights  A.m. labs  Full code  DVT prophylaxis with heparin  Clinical course dictate further management  Discussed with nurse at bedside    DVT prophylaxis:  Medical DVT prophylaxis orders are present.        CODE STATUS:   Level Of  Support Discussed With: Patient  Code Status (Patient has no pulse and is not breathing): CPR (Attempt to Resuscitate)  Medical Interventions (Patient has pulse or is breathing): Full Support    Electronically signed by Donald Clark MD, 03/18/24, 4:52 PM EDT.  Portions of this documentation were transcribed electronically from a voice recognition software.  I confirm all data accurately represents the service(s) I performed at today's visit.

## 2024-03-19 LAB
ALBUMIN SERPL-MCNC: 3.7 G/DL (ref 3.5–5.2)
ALP SERPL-CCNC: 73 U/L (ref 39–117)
ALT SERPL W P-5'-P-CCNC: 17 U/L (ref 1–33)
ANION GAP SERPL CALCULATED.3IONS-SCNC: 12.4 MMOL/L (ref 5–15)
AST SERPL-CCNC: 17 U/L (ref 1–32)
BACTERIA SPEC AEROBE CULT: NO GROWTH
BASOPHILS # BLD AUTO: 0.01 10*3/MM3 (ref 0–0.2)
BASOPHILS NFR BLD AUTO: 0.1 % (ref 0–1.5)
BILIRUB CONJ SERPL-MCNC: <0.2 MG/DL (ref 0–0.3)
BILIRUB INDIRECT SERPL-MCNC: NORMAL MG/DL
BILIRUB SERPL-MCNC: 0.2 MG/DL (ref 0–1.2)
BUN SERPL-MCNC: 65 MG/DL (ref 8–23)
BUN/CREAT SERPL: 34.2 (ref 7–25)
CALCIUM SPEC-SCNC: 8.9 MG/DL (ref 8.6–10.5)
CHLORIDE SERPL-SCNC: 100 MMOL/L (ref 98–107)
CO2 SERPL-SCNC: 22.6 MMOL/L (ref 22–29)
CREAT SERPL-MCNC: 1.9 MG/DL (ref 0.57–1)
DEPRECATED RDW RBC AUTO: 44.7 FL (ref 37–54)
EGFRCR SERPLBLD CKD-EPI 2021: 28.1 ML/MIN/1.73
EOSINOPHIL # BLD AUTO: 0 10*3/MM3 (ref 0–0.4)
EOSINOPHIL NFR BLD AUTO: 0 % (ref 0.3–6.2)
ERYTHROCYTE [DISTWIDTH] IN BLOOD BY AUTOMATED COUNT: 13.3 % (ref 12.3–15.4)
GLUCOSE BLDC GLUCOMTR-MCNC: 127 MG/DL (ref 70–99)
GLUCOSE BLDC GLUCOMTR-MCNC: 131 MG/DL (ref 70–99)
GLUCOSE BLDC GLUCOMTR-MCNC: 165 MG/DL (ref 70–99)
GLUCOSE BLDC GLUCOMTR-MCNC: 186 MG/DL (ref 70–99)
GLUCOSE SERPL-MCNC: 168 MG/DL (ref 65–99)
HCT VFR BLD AUTO: 31.8 % (ref 34–46.6)
HGB BLD-MCNC: 10 G/DL (ref 12–15.9)
IMM GRANULOCYTES # BLD AUTO: 0.05 10*3/MM3 (ref 0–0.05)
IMM GRANULOCYTES NFR BLD AUTO: 0.5 % (ref 0–0.5)
LYMPHOCYTES # BLD AUTO: 1.05 10*3/MM3 (ref 0.7–3.1)
LYMPHOCYTES NFR BLD AUTO: 10 % (ref 19.6–45.3)
MAGNESIUM SERPL-MCNC: 2 MG/DL (ref 1.6–2.4)
MCH RBC QN AUTO: 29.2 PG (ref 26.6–33)
MCHC RBC AUTO-ENTMCNC: 31.4 G/DL (ref 31.5–35.7)
MCV RBC AUTO: 92.7 FL (ref 79–97)
MONOCYTES # BLD AUTO: 0.44 10*3/MM3 (ref 0.1–0.9)
MONOCYTES NFR BLD AUTO: 4.2 % (ref 5–12)
NEUTROPHILS NFR BLD AUTO: 8.98 10*3/MM3 (ref 1.7–7)
NEUTROPHILS NFR BLD AUTO: 85.2 % (ref 42.7–76)
NRBC BLD AUTO-RTO: 0 /100 WBC (ref 0–0.2)
PHOSPHATE SERPL-MCNC: 3.9 MG/DL (ref 2.5–4.5)
PLATELET # BLD AUTO: 202 10*3/MM3 (ref 140–450)
PMV BLD AUTO: 10.7 FL (ref 6–12)
POTASSIUM SERPL-SCNC: 4.6 MMOL/L (ref 3.5–5.2)
PROT SERPL-MCNC: 6.7 G/DL (ref 6–8.5)
RBC # BLD AUTO: 3.43 10*6/MM3 (ref 3.77–5.28)
SODIUM SERPL-SCNC: 135 MMOL/L (ref 136–145)
WBC NRBC COR # BLD AUTO: 10.53 10*3/MM3 (ref 3.4–10.8)

## 2024-03-19 PROCEDURE — 84100 ASSAY OF PHOSPHORUS: CPT | Performed by: FAMILY MEDICINE

## 2024-03-19 PROCEDURE — 25010000002 HEPARIN (PORCINE) PER 1000 UNITS: Performed by: STUDENT IN AN ORGANIZED HEALTH CARE EDUCATION/TRAINING PROGRAM

## 2024-03-19 PROCEDURE — 99232 SBSQ HOSP IP/OBS MODERATE 35: CPT | Performed by: FAMILY MEDICINE

## 2024-03-19 PROCEDURE — 94664 DEMO&/EVAL PT USE INHALER: CPT

## 2024-03-19 PROCEDURE — 80048 BASIC METABOLIC PNL TOTAL CA: CPT | Performed by: FAMILY MEDICINE

## 2024-03-19 PROCEDURE — 94799 UNLISTED PULMONARY SVC/PX: CPT

## 2024-03-19 PROCEDURE — 63710000001 PREDNISONE PER 1 MG: Performed by: FAMILY MEDICINE

## 2024-03-19 PROCEDURE — 80076 HEPATIC FUNCTION PANEL: CPT | Performed by: FAMILY MEDICINE

## 2024-03-19 PROCEDURE — 63710000001 INSULIN LISPRO (HUMAN) PER 5 UNITS: Performed by: FAMILY MEDICINE

## 2024-03-19 PROCEDURE — 82948 REAGENT STRIP/BLOOD GLUCOSE: CPT

## 2024-03-19 PROCEDURE — 85025 COMPLETE CBC W/AUTO DIFF WBC: CPT | Performed by: FAMILY MEDICINE

## 2024-03-19 PROCEDURE — 83735 ASSAY OF MAGNESIUM: CPT | Performed by: FAMILY MEDICINE

## 2024-03-19 PROCEDURE — 94761 N-INVAS EAR/PLS OXIMETRY MLT: CPT

## 2024-03-19 RX ADMIN — ROPINIROLE HYDROCHLORIDE 2 MG: 1 TABLET, FILM COATED ORAL at 21:58

## 2024-03-19 RX ADMIN — Medication 10 ML: at 21:59

## 2024-03-19 RX ADMIN — ALLOPURINOL 100 MG: 100 TABLET ORAL at 08:48

## 2024-03-19 RX ADMIN — ROPINIROLE HYDROCHLORIDE 1 MG: 1 TABLET, FILM COATED ORAL at 06:07

## 2024-03-19 RX ADMIN — INSULIN LISPRO 2 UNITS: 100 INJECTION, SOLUTION INTRAVENOUS; SUBCUTANEOUS at 17:02

## 2024-03-19 RX ADMIN — BUDESONIDE 0.5 MG: 0.5 INHALANT RESPIRATORY (INHALATION) at 19:24

## 2024-03-19 RX ADMIN — ROPINIROLE HYDROCHLORIDE 1 MG: 1 TABLET, FILM COATED ORAL at 12:42

## 2024-03-19 RX ADMIN — HEPARIN SODIUM 5000 UNITS: 5000 INJECTION INTRAVENOUS; SUBCUTANEOUS at 21:59

## 2024-03-19 RX ADMIN — TRAZODONE HYDROCHLORIDE 100 MG: 100 TABLET ORAL at 21:58

## 2024-03-19 RX ADMIN — Medication 10 ML: at 08:51

## 2024-03-19 RX ADMIN — ASPIRIN 81 MG: 81 TABLET, COATED ORAL at 08:47

## 2024-03-19 RX ADMIN — INSULIN LISPRO 2 UNITS: 100 INJECTION, SOLUTION INTRAVENOUS; SUBCUTANEOUS at 21:58

## 2024-03-19 RX ADMIN — HEPARIN SODIUM 5000 UNITS: 5000 INJECTION INTRAVENOUS; SUBCUTANEOUS at 06:06

## 2024-03-19 RX ADMIN — METOPROLOL SUCCINATE 12.5 MG: 25 TABLET, EXTENDED RELEASE ORAL at 08:47

## 2024-03-19 RX ADMIN — ATORVASTATIN CALCIUM 40 MG: 40 TABLET, FILM COATED ORAL at 21:58

## 2024-03-19 RX ADMIN — PREDNISONE 40 MG: 20 TABLET ORAL at 08:48

## 2024-03-19 RX ADMIN — BUDESONIDE 0.5 MG: 0.5 INHALANT RESPIRATORY (INHALATION) at 07:38

## 2024-03-19 RX ADMIN — FUROSEMIDE 60 MG: 40 TABLET ORAL at 08:47

## 2024-03-19 NOTE — PLAN OF CARE
Goal Outcome Evaluation: VVS, no complaints. Patient did refuse heparin dose.

## 2024-03-19 NOTE — CASE MANAGEMENT/SOCIAL WORK
SW spoke with Pt today, Pt states that she had only been planing on staying in [place] for 3-4 months and has now been here 11 months. Pt has been living with her sister, her sister's  and two adult children in a camper without runing water or electric. Pt plans to go back to [place] to stay with a friend who has offered her a place to stay. Pt states this friend lives in [place] [facility] [place], she does not have an address at this time. Pt states she has been giving her money to scam from social media and television. Pt has been trying to fix this problem but she states she feels bad for people who need help. Pt had been using [employer] and is working on switching to [employer] to help stop issues with scams.  SW did discuss the importance of not sharing financial information with people over social media. Pt states she makes $1500.00 a month. Pt did ask if there was any she could get a bus ticket to [place]. SW explained to Pt if she does have finances to afford bus ticket we could help facilitate purchase of bus ticket if needed, Pt states she does get paid on the 28th of every month. SW did look up possible cost of bus ticket to [place] ($140-$150). SW will discuss cost of ticket with Pt once Pt has confirmed address in [place]. Pt states that she has looked at [employer] tickets and she would have to pay $20.00 per luggage, she stated she may have to leave some belongings here and return later. Pt will confirm address with friend. If Pt is unable to get to [place] Pt states she will have to return to live with her sister and other family members. SW will continue to follow for needs.

## 2024-03-19 NOTE — PROGRESS NOTES
[facility]   Hospitalist Progress Note  Date: 3/19/2024  Patient Name: Stefania Molina  : 1955  MRN: [id number]  Date of admission: 3/16/2024      Subjective   Subjective       Chief complaint: Shortness of breath    Summary:  68-year-old female with hepatitis C, CAD status post PCI, nonischemic cardiomyopathy, heart failure reduced ejection fraction, with documented intolerance to Entresto, previously refused to take aspirin and statin, left bundle branch block, diabetes mellitus, gout, hypertension, history of DCIS left breast, untreated, being followed as outpatient, history of CVA with left-sided weakness, hospitalized on 3/16/2024 for chief complaint of shortness of breath, with recent environmental exposures 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  Cardiology and nephrology consulted with underlying CHF, and worsening renal function with diuresis.  Limited echo showing worsening cardiac function EF 26 to 30%.    Interval follow-up: Seen and examined this morning, no acute distress, no acute major new events, breathing continues to improve, creatinine down to 1.9.  BUN 65, potassium 4.6, white blood cell count 10,000.  On room air, sats are in the 90s.  2.1 L of urine output over the past 24 hours.  Unfortunately the patient social situations outside the hospital are Lopez, she lives in a camper without running water heat, there are several people living in the camper with a bunch of animals.  She wishes to go back to [place], she is undergoing some financial hardships.  Plans to look into purchasing a bus to get soon.  Telemetry reviewed, no acute major events.  Still has dyspnea on  exertion.  Working to continue diuresis to help set her up better for disposition.  In addition to ensuring renal function stability.    Review of systems:  All systems reviewed and negative except for weakness, fatigue, cough, shortness of breath with exertional activity    Objective   Objective     Vitals:   Temp:  [97.5 °F (36.4 °C)-97.9 °F (36.6 °C)] 97.5 °F (36.4 °C)  Heart Rate:  [82-93] 92  Resp:  [18-20] 18  BP: (121-147)/(56-86) 147/75  Flow (L/min):  [0-2] 0  Physical Exam    Constitutional: Awake, alert, no acute distress sitting at the edge of the bed    Eyes: Pupils equal, sclerae anicteric, no conjunctival injection   HENT: NCAT, mucous membranes moist   Neck: Supple, full range of motion   Respiratory: Diminished breath sounds with scattered rhonchi, very fine crackles at the bases   Cardiovascular: RRR, no murmurs, rubs, or gallops, palpable pedal pulses bilaterally   Gastrointestinal: Positive bowel sounds, soft, nontender, nondistended   Musculoskeletal: Trace bilateral ankle edema, no clubbing or cyanosis to extremities   Psychiatric: Appropriate affect, cooperative   Neurologic: Oriented x 3, strength symmetric in all extremities, Cranial Nerves grossly intact to confrontation, speech clear   Skin: No rashes visible on exposed skin      Result Review    Result Review:  I have personally reviewed the pertinent results from the past 24 hours to 3/19/2024 14:38 EDT and agree with these findings:  [x]  Laboratory   CBC          3/17/2024    05:04 3/18/2024    04:44 3/19/2024    04:10   CBC   WBC 12.53  12.63  10.53    RBC 3.63  3.38  3.43    Hemoglobin 10.5  10.0  10.0    Hematocrit 33.4  30.8  31.8    MCV 92.0  91.1  92.7    MCH 28.9  29.6  29.2    MCHC 31.4  32.5  31.4    RDW 13.2  13.5  13.3    Platelets 182  201  202      BMP          3/17/2024    05:04 3/18/2024    04:44 3/19/2024    04:10   BMP   BUN 38  62  65    Creatinine 1.67  2.28  1.90    Sodium 135  137  135    Potassium 4.8  4.9  4.6     Chloride 101  100  100    CO2 18.8  23.2  22.6    Calcium 9.4  9.2  8.9      LIVER FUNCTION TESTS:      Lab 03/19/24  0410 03/18/24  0444 03/16/24  1041   TOTAL PROTEIN 6.7 6.9 6.9   ALBUMIN 3.7 3.8 3.8   GLOBULIN  --   --  3.1   ALT (SGPT) 17 13 13   AST (SGOT) 17 20 16   BILIRUBIN 0.2 0.2 0.5   BILIRUBIN DIRECT <0.2 <0.2  --    ALK PHOS 73 77 89       [x]  Microbiology   Microbiology Results (last 10 days)       Procedure Component Value - Date/Time    Urine Culture - Urine, Urine, Clean Catch [[id number]]  (Normal) Collected: 03/18/24 1022    Lab Status: Final result Specimen: Urine, Clean Catch Updated: 03/19/24 0936     Urine Culture No growth    COVID PRE-OP / PRE-PROCEDURE SCREENING ORDER (NO ISOLATION) - Swab, Nasopharynx [[id number]]  (Normal) Collected: 03/16/24 1612    Lab Status: Final result Specimen: Swab from Nasopharynx Updated: 03/16/24 1658    Narrative:      The following orders were created for panel order COVID PRE-OP / PRE-PROCEDURE SCREENING ORDER (NO ISOLATION) - Swab, Nasopharynx.  Procedure                               Abnormality         Status                     ---------                               -----------         ------                     COVID-19, FLU A/B, RSV P...[[id number]]  Normal              Final result                 Please view results for these tests on the individual orders.    COVID-19, FLU A/B, RSV PCR 1 HR TAT - Swab, Nasopharynx [[id number]]  (Normal) Collected: 03/16/24 1612    Lab Status: Final result Specimen: Swab from Nasopharynx Updated: 03/16/24 1658     COVID19 Not Detected     Influenza A PCR Not Detected     Influenza B PCR Not Detected     RSV, PCR Not Detected    Narrative:      Fact sheet for providers: https://www.fda.gov/media/336005/download    Fact sheet for patients: https://www.fda.gov/media/335740/download    Test performed by PCR.              [x]  Radiology US Renal Bilateral    Result Date: 3/18/2024    1. Grossly unremarkable appearance of  the kidneys bilaterally 2. Nonvisualization of the bladder      TAVARES REAL MD       Electronically Signed and Approved By: TAVARES REAL MD on 3/18/2024 at 14:36             US Liver    Result Date: 3/18/2024    1. No gallstones or biliary obstruction.  The gallbladder is partially contracted and there is borderline thickening of the wall.  This wall thickening may be secondary to contraction rather than cholecystitis. 2. Small right renal cyst. 3. No liver mass.       ILIA RUIZ MD       Electronically Signed and Approved By: ILIA RUIZ MD on 3/18/2024 at 6:25             CT Chest With Contrast Diagnostic    Result Date: 3/16/2024    1. Allowing for the limitation of mild respiratory motion artifact, no pulmonary emboli are seen. 2. Moderate-sized bilateral pleural effusions with partial underlying bilateral lower lobe compressive atelectasis. 3. Mild scattered bilateral ground-glass opacities, which may be due to pulmonary edema or infection. 4. Abnormal appearance of the partially included left breast with skin thickening, mass, and calcifications.  Findings are concerning for malignancy or potentially post treatment changes.  There is no previous breast imaging for comparison.  Recommend correlation with the patient's history and physical exam.  If this is an unexpected finding, recommend further evaluation with outpatient bilateral diagnostic mammogram and breast ultrasound.     JINA VEGAS MD       Electronically Signed and Approved By: JINA VEGAS MD on 3/16/2024 at 12:56             XR Chest 1 View    Result Date: 3/16/2024    New airspace opacities in both lower lungs, concerning for pneumonia and/or pulmonary edema with pleural effusions.       JINA VEGAS MD       Electronically Signed and Approved By: JINA VEGAS MD on 3/16/2024 at 11:14                [x]  EKG/Telemetry   ECG 12 Lead ED Triage Standing Order; SOA   Final Result   HEART RATE= 85  bpm   RR Interval=  708  ms   WY Interval= 157  ms   P Horizontal Axis=   deg   P Front Axis= 61  deg   QRSD Interval= 129  ms   QT Interval= 405  ms   QTcB= 481  ms   QRS Axis= -23  deg   T Wave Axis= 30  deg   - ABNORMAL ECG -   Sinus rhythm   Probable left atrial enlargement   Left bundle branch block   When compared with ECG of 22-Feb-2024 20:40:31,   No significant change   Electronically Signed By: Jarod Yeh ([facility]) 16-Mar-2024 21:16:12   Date and Time of Study: 2024-03-16 10:39:58                [x]  Cardiology/Vascular   []  Pathology  [x]  Old records  []  Other:    Assessment & Plan   Assessment / Plan     Assessment/Plan:    Assessment:  Acute decompensation of chronic systolic heart failure  Left bundle branch block  CKD stage IIIa  Bilateral pleural effusions  NSTEMI due to decompensation of heart failure  Questionable asthma exacerbation due to environmental exposures  Left breast DCIS; untreated, needs follow-up  Hep C positive antibody  CAD with history of PCI  Nonischemic cardiomyopathy  Heart failure reduced ejection fraction  Previously intolerant to Entresto due to hypotension  Previously off aspirin and statin due to personal choices  Diabetes mellitus  Gout  Essential hypertension    Plan:  Labs and imaging reviewed  Continue Lasix 60 mg p.o. daily  Continue holding Jardiance  Worsening renal function, consulted nephrology, discussed with Dr. Parra, recommendations appreciated; continued monitoring renal function to ensure stability  Cardiology consulted discussed with Dr. Cortez, recommendations appreciated; patient cannot tolerate Entresto; the best bet is keeping her on low-dose beta-blockers; will follow-up as outpatient  Continue prednisone 40 mg daily for 5 days total  Continue Pulmicort nebs twice daily  Needs follow-up with the heart failure clinic she remains local  Continue aspirin 81 mg daily  Continue atorvastatin 40 mg nightly  Continue metoprolol XL 12.5 mg daily  Monitor on telemetry  Strict  I's and O's  Daily weights  A.m. labs   involved for safe disposition planning  Full code  DVT prophylaxis with heparin  Clinical course dictate further management  Discussed with nurse at bedside    DVT prophylaxis:  Medical DVT prophylaxis orders are present.        CODE STATUS:   Level Of Support Discussed With: Patient  Code Status (Patient has no pulse and is not breathing): CPR (Attempt to Resuscitate)  Medical Interventions (Patient has pulse or is breathing): Full Support    Electronically signed by Donald Clark MD, 03/19/24, 2:42 PM EDT.  Portions of this documentation were transcribed electronically from a voice recognition software.  I confirm all data accurately represents the service(s) I performed at today's visit.

## 2024-03-19 NOTE — PLAN OF CARE
Goal Outcome Evaluation   Patient AAOx4, rested throughout night- patient desat to 88% while she was sleeping put patient on 2 liters N/c, No c/o pain or Sob, no acute events this shift.

## 2024-03-19 NOTE — PROGRESS NOTES
[facility]   Cardiology Progress Note      Patient Name: Stefania Molina  : 1955  MRN: [id number]  Primary Care Physician:  Angie Orourke APRN  Referring Physician: No ref. provider found  Date of admission: 3/16/2024    Subjective   Subjective     Chief Complaint: CHF    HPI:  Stefania Molina is a 68 y.o. female with cardiomyopathy and CHF.  Admitted with worsening shortness of breath.      Objective    Objective     Vitals:   Vitals:    24 2300 24 0357 24 0658 24 0738   BP: 121/57 128/56 126/63    BP Location: Left arm Left arm Left arm    Patient Position: Lying Lying Lying    Pulse:  84 84 83   Resp:    Temp: 97.5 °F (36.4 °C) 97.7 °F (36.5 °C) 97.5 °F (36.4 °C)    TempSrc: Oral Oral Oral    SpO2: 96% 96% 95% 95%   Weight:       Height:                Physical Exam:   Constitutional: Awake, alert, No acute distress    Eyes: PERRLA, sclerae anicteric, no conjunctival injection   HENT: NCAT, mucous membranes moist   Neck: Supple, no thyromegaly, no lymphadenopathy, trachea midline   Respiratory: Clear to auscultation bilaterally, nonlabored respirations    Cardiovascular: RRR, no murmurs, rubs, or gallops, palpable pedal pulses bilaterally        Current medications:  allopurinol, 100 mg, Oral, Daily  aspirin, 81 mg, Oral, Daily  atorvastatin, 40 mg, Oral, Nightly  budesonide, 0.5 mg, Nebulization, BID - RT  [Held by provider] empagliflozin, 10 mg, Oral, Daily  furosemide, 60 mg, Oral, Daily  heparin (porcine), 5,000 Units, Subcutaneous, Q8H  insulin lispro, 2-7 Units, Subcutaneous, 4x Daily AC & at Bedtime  ipratropium-albuterol, 3 mL, Nebulization, 4x Daily - RT  metoprolol succinate XL, 12.5 mg, Oral, Q24H  predniSONE, 40 mg, Oral, Daily With Breakfast  rOPINIRole, 1 mg, Oral, Daily With Lunch  rOPINIRole, 1 mg, Oral, QAM  rOPINIRole, 2 mg, Oral, Nightly  sodium chloride, 10 mL, Intravenous, Q12H  traZODone, 100 mg, Oral, Nightly      Current IV drips:        Result Review    Result Review:  I have personally reviewed the results from the time of this admission to 3/19/2024 08:34 EDT and agree with these findings:  []  Laboratory  []  EKG/Telemetry   []  Cardiology/Vascular   []  Radiology         CBC          3/17/2024    05:04 3/18/2024    04:44 3/19/2024    04:10   CBC   WBC 12.53  12.63  10.53    RBC 3.63  3.38  3.43    Hemoglobin 10.5  10.0  10.0    Hematocrit 33.4  30.8  31.8    MCV 92.0  91.1  92.7    MCH 28.9  29.6  29.2    MCHC 31.4  32.5  31.4    RDW 13.2  13.5  13.3    Platelets 182  201  202      CMP          3/17/2024    05:04 3/18/2024    04:44 3/19/2024    04:10   CMP   Glucose 161  144  168    BUN 38  62  65    Creatinine 1.67  2.28  1.90    EGFR 32.8  22.6  28.1    Sodium 135  137  135    Potassium 4.8  4.9  4.6    Chloride 101  100  100    Calcium 9.4  9.2  8.9    Total Protein  6.9  6.7    Albumin  3.8  3.7    Total Bilirubin  0.2  0.2    Alkaline Phosphatase  77  73    AST (SGOT)  20  17    ALT (SGPT)  13  17    BUN/Creatinine Ratio 22.8  27.2  34.2    Anion Gap 15.2  13.8  12.4      Results for orders placed during the hospital encounter of 03/16/24    Adult Transthoracic Echo Complete w/ Color, Spectral and Contrast if necessary per protocol    Interpretation Summary    Left ventricular ejection fraction appears to be 26 - 30%.    Left ventricular wall thickness is consistent with mild concentric hypertrophy.    Left ventricular diastolic function is consistent with (grade III w/high LAP) reversible restrictive pattern.    The left atrial cavity is moderate to severely dilated.    Moderate mitral valve regurgitation is present.    Estimated right ventricular systolic pressure from tricuspid regurgitation is markedly elevated (>55 mmHg).        Lab Results   Component Value Date    PROBNP 16,037.0 (H) 03/16/2024         Telemetry reviewed     Assessment / Plan     ASSESSMENT:    Acute decompensated heart failure  Dilated nonischemic  cardiomyopathy.  CORONARY ARTERY DISEASE s/p PTCA/stent.  Acute on chronic kidney disease stage IV.      PLAN:  1.  Continue p.o. Lasix  2.  Continue metoprolol and increased dose depending upon blood pressure.  3.  New renal management as per nephrology.    Electronically signed by Paco Cortez MD, 03/19/24, 8:34 AM EDT.

## 2024-03-19 NOTE — PROGRESS NOTES
" LOS: 3 days   Patient Care Team:  Angie Orourke APRN as PCP - General (Internal Medicine & Pediatrics)    Chief Complaint: NARGIS    Subjective     History of Present Illness  Pt without any acute complaints.  Breathing improved now    Subjective:  Symptoms:  She reports shortness of breath.  No chest pain, chest pressure or anxiety.    Diet:  No nausea or vomiting.    Pain:  She reports no pain.        History taken from: patient chart RN    Objective     Vital Sign Min/Max for last 24 hours  Temp  Min: 97.5 °F (36.4 °C)  Max: 97.9 °F (36.6 °C)   BP  Min: 121/57  Max: 139/73   Pulse  Min: 83  Max: 93   Resp  Min: 18  Max: 20   SpO2  Min: 90 %  Max: 97 %   Flow (L/min)  Min: 0  Max: 2   No data recorded     Flowsheet Rows      Flowsheet Row First Filed Value   Admission Height 160 cm (63\") Documented at 03/16/2024 1014   Admission Weight 87.5 kg (192 lb 14.4 oz) Documented at 03/16/2024 1014            I/O this shift:  In: 240 [P.O.:240]  Out: -   I/O last 3 completed shifts:  In: 957 [P.O.:957]  Out: 2900 [Urine:2900]    Objective:  General Appearance:  Comfortable.    Vital signs: (most recent): Blood pressure 126/63, pulse 83, temperature 97.5 °F (36.4 °C), temperature source Oral, resp. rate 20, height 160 cm (63\"), weight 90.8 kg (200 lb 2.8 oz), SpO2 95%.  Vital signs are normal.    Output: Producing urine.    HEENT: Normal HEENT exam.    Lungs:  Normal effort and normal respiratory rate.    Heart: Normal rate.  Regular rhythm.    Abdomen: Abdomen is soft.  Bowel sounds are normal.   There is no abdominal tenderness.     Extremities: Normal range of motion.  There is dependent edema.    Pulses: Distal pulses are intact.    Neurological: Patient is alert and oriented to person, place and time.    Pupils:  Pupils are equal, round, and reactive to light.    Skin:  Warm and dry.                Results Review:     I reviewed the patient's new clinical results.  I reviewed the patient's new imaging results and " "agree with the interpretation.  I reviewed the patient's other test results and agree with the interpretation    WBC WBC   Date Value Ref Range Status   03/19/2024 10.53 3.40 - 10.80 10*3/mm3 Final   03/18/2024 12.63 (H) 3.40 - 10.80 10*3/mm3 Final   03/17/2024 12.53 (H) 3.40 - 10.80 10*3/mm3 Final      HGB Hemoglobin   Date Value Ref Range Status   03/19/2024 10.0 (L) 12.0 - 15.9 g/dL Final   03/18/2024 10.0 (L) 12.0 - 15.9 g/dL Final   03/17/2024 10.5 (L) 12.0 - 15.9 g/dL Final      HCT Hematocrit   Date Value Ref Range Status   03/19/2024 31.8 (L) 34.0 - 46.6 % Final   03/18/2024 30.8 (L) 34.0 - 46.6 % Final   03/17/2024 33.4 (L) 34.0 - 46.6 % Final      Platlets No results found for: \"LABPLAT\"   MCV MCV   Date Value Ref Range Status   03/19/2024 92.7 79.0 - 97.0 fL Final   03/18/2024 91.1 79.0 - 97.0 fL Final   03/17/2024 92.0 79.0 - 97.0 fL Final          Sodium Sodium   Date Value Ref Range Status   03/19/2024 135 (L) 136 - 145 mmol/L Final   03/18/2024 137 136 - 145 mmol/L Final   03/17/2024 135 (L) 136 - 145 mmol/L Final      Potassium Potassium   Date Value Ref Range Status   03/19/2024 4.6 3.5 - 5.2 mmol/L Final   03/18/2024 4.9 3.5 - 5.2 mmol/L Final     Comment:     Slight hemolysis detected by analyzer. Result may be falsely elevated.   03/17/2024 4.8 3.5 - 5.2 mmol/L Final      Chloride Chloride   Date Value Ref Range Status   03/19/2024 100 98 - 107 mmol/L Final   03/18/2024 100 98 - 107 mmol/L Final   03/17/2024 101 98 - 107 mmol/L Final      CO2 CO2   Date Value Ref Range Status   03/19/2024 22.6 22.0 - 29.0 mmol/L Final   03/18/2024 23.2 22.0 - 29.0 mmol/L Final   03/17/2024 18.8 (L) 22.0 - 29.0 mmol/L Final      BUN BUN   Date Value Ref Range Status   03/19/2024 65 (H) 8 - 23 mg/dL Final   03/18/2024 62 (H) 8 - 23 mg/dL Final   03/17/2024 38 (H) 8 - 23 mg/dL Final      Creatinine Creatinine   Date Value Ref Range Status   03/19/2024 1.90 (H) 0.57 - 1.00 mg/dL Final   03/18/2024 2.28 (H) 0.57 - " "1.00 mg/dL Final   03/17/2024 1.67 (H) 0.57 - 1.00 mg/dL Final      Calcium Calcium   Date Value Ref Range Status   03/19/2024 8.9 8.6 - 10.5 mg/dL Final   03/18/2024 9.2 8.6 - 10.5 mg/dL Final   03/17/2024 9.4 8.6 - 10.5 mg/dL Final      PO4 No results found for: \"CAPO4\"   Albumin Albumin   Date Value Ref Range Status   03/19/2024 3.7 3.5 - 5.2 g/dL Final   03/18/2024 3.8 3.5 - 5.2 g/dL Final      Magnesium Magnesium   Date Value Ref Range Status   03/19/2024 2.0 1.6 - 2.4 mg/dL Final   03/18/2024 2.1 1.6 - 2.4 mg/dL Final   03/17/2024 2.0 1.6 - 2.4 mg/dL Final      Uric Acid Uric Acid   Date Value Ref Range Status   03/18/2024 6.7 (H) 2.4 - 5.7 mg/dL Final        Medication Review:   allopurinol, 100 mg, Oral, Daily  aspirin, 81 mg, Oral, Daily  atorvastatin, 40 mg, Oral, Nightly  budesonide, 0.5 mg, Nebulization, BID - RT  [Held by provider] empagliflozin, 10 mg, Oral, Daily  furosemide, 60 mg, Oral, Daily  heparin (porcine), 5,000 Units, Subcutaneous, Q8H  insulin lispro, 2-7 Units, Subcutaneous, 4x Daily AC & at Bedtime  ipratropium-albuterol, 3 mL, Nebulization, 4x Daily - RT  metoprolol succinate XL, 12.5 mg, Oral, Q24H  predniSONE, 40 mg, Oral, Daily With Breakfast  rOPINIRole, 1 mg, Oral, Daily With Lunch  rOPINIRole, 1 mg, Oral, QAM  rOPINIRole, 2 mg, Oral, Nightly  sodium chloride, 10 mL, Intravenous, Q12H  traZODone, 100 mg, Oral, Nightly          Assessment & Plan       Acute decompensated heart failure      Assessment & Plan  - Acute kidney injury, etiology is uncertain possibly related to diuresis versus CT contrast on 3/16/2024.  Creatinine improved to 1.90 today.  UA benign without blood or protein.   Farxiga and spironolactone are on hold now.  Continue lasix 60mg po qday.  - Chronic kidney disease stage IIIb with baseline creatinine 1.7-1.9 from previous records.  This is likely related to diabetic hypertensive nephropathy.  - Type 2 diabetes since 1996 with known diabetic retinopathy and " neuropathy.  - Hypertension, blood pressure is acceptable.  - History of congestive heart failure, 2D echo showing reduced ejection fraction with LVH, being evaluated by cardiology.  - History of hepatitis C.  - Right renal cyst noted on imaging.  - Abnormal breast imaging, per primary.  -Gout-  on allopurinol.  Uric acid at goal.    Rayray Parra MD  03/19/24  11:17 EDT

## 2024-03-20 LAB
ALBUMIN SERPL-MCNC: 3.8 G/DL (ref 3.5–5.2)
ALP SERPL-CCNC: 73 U/L (ref 39–117)
ALT SERPL W P-5'-P-CCNC: 18 U/L (ref 1–33)
ANION GAP SERPL CALCULATED.3IONS-SCNC: 13.6 MMOL/L (ref 5–15)
AST SERPL-CCNC: 15 U/L (ref 1–32)
BASOPHILS # BLD AUTO: 0 10*3/MM3 (ref 0–0.2)
BASOPHILS NFR BLD AUTO: 0 % (ref 0–1.5)
BILIRUB CONJ SERPL-MCNC: <0.2 MG/DL (ref 0–0.3)
BILIRUB INDIRECT SERPL-MCNC: NORMAL MG/DL
BILIRUB SERPL-MCNC: 0.3 MG/DL (ref 0–1.2)
BUN SERPL-MCNC: 67 MG/DL (ref 8–23)
BUN/CREAT SERPL: 38.7 (ref 7–25)
CALCIUM SPEC-SCNC: 9.1 MG/DL (ref 8.6–10.5)
CHLORIDE SERPL-SCNC: 98 MMOL/L (ref 98–107)
CO2 SERPL-SCNC: 24.4 MMOL/L (ref 22–29)
CREAT SERPL-MCNC: 1.73 MG/DL (ref 0.57–1)
DEPRECATED RDW RBC AUTO: 42.9 FL (ref 37–54)
EGFRCR SERPLBLD CKD-EPI 2021: 31.5 ML/MIN/1.73
EOSINOPHIL # BLD AUTO: 0 10*3/MM3 (ref 0–0.4)
EOSINOPHIL NFR BLD AUTO: 0 % (ref 0.3–6.2)
ERYTHROCYTE [DISTWIDTH] IN BLOOD BY AUTOMATED COUNT: 13.1 % (ref 12.3–15.4)
GLUCOSE BLDC GLUCOMTR-MCNC: 127 MG/DL (ref 70–99)
GLUCOSE BLDC GLUCOMTR-MCNC: 148 MG/DL (ref 70–99)
GLUCOSE BLDC GLUCOMTR-MCNC: 172 MG/DL (ref 70–99)
GLUCOSE BLDC GLUCOMTR-MCNC: 224 MG/DL (ref 70–99)
GLUCOSE SERPL-MCNC: 148 MG/DL (ref 65–99)
HCT VFR BLD AUTO: 32.4 % (ref 34–46.6)
HGB BLD-MCNC: 10.6 G/DL (ref 12–15.9)
IMM GRANULOCYTES # BLD AUTO: 0.06 10*3/MM3 (ref 0–0.05)
IMM GRANULOCYTES NFR BLD AUTO: 0.6 % (ref 0–0.5)
LYMPHOCYTES # BLD AUTO: 1.24 10*3/MM3 (ref 0.7–3.1)
LYMPHOCYTES NFR BLD AUTO: 12.3 % (ref 19.6–45.3)
MAGNESIUM SERPL-MCNC: 2.1 MG/DL (ref 1.6–2.4)
MCH RBC QN AUTO: 29.5 PG (ref 26.6–33)
MCHC RBC AUTO-ENTMCNC: 32.7 G/DL (ref 31.5–35.7)
MCV RBC AUTO: 90.3 FL (ref 79–97)
MONOCYTES # BLD AUTO: 0.47 10*3/MM3 (ref 0.1–0.9)
MONOCYTES NFR BLD AUTO: 4.7 % (ref 5–12)
NEUTROPHILS NFR BLD AUTO: 8.29 10*3/MM3 (ref 1.7–7)
NEUTROPHILS NFR BLD AUTO: 82.4 % (ref 42.7–76)
NRBC BLD AUTO-RTO: 0 /100 WBC (ref 0–0.2)
PHOSPHATE SERPL-MCNC: 3.8 MG/DL (ref 2.5–4.5)
PLATELET # BLD AUTO: 219 10*3/MM3 (ref 140–450)
PMV BLD AUTO: 10.4 FL (ref 6–12)
POTASSIUM SERPL-SCNC: 4.6 MMOL/L (ref 3.5–5.2)
PROT SERPL-MCNC: 6.8 G/DL (ref 6–8.5)
RBC # BLD AUTO: 3.59 10*6/MM3 (ref 3.77–5.28)
SODIUM SERPL-SCNC: 136 MMOL/L (ref 136–145)
WBC NRBC COR # BLD AUTO: 10.06 10*3/MM3 (ref 3.4–10.8)

## 2024-03-20 PROCEDURE — 80048 BASIC METABOLIC PNL TOTAL CA: CPT | Performed by: FAMILY MEDICINE

## 2024-03-20 PROCEDURE — 99232 SBSQ HOSP IP/OBS MODERATE 35: CPT | Performed by: FAMILY MEDICINE

## 2024-03-20 PROCEDURE — 94799 UNLISTED PULMONARY SVC/PX: CPT

## 2024-03-20 PROCEDURE — 63710000001 INSULIN LISPRO (HUMAN) PER 5 UNITS: Performed by: FAMILY MEDICINE

## 2024-03-20 PROCEDURE — 84100 ASSAY OF PHOSPHORUS: CPT | Performed by: FAMILY MEDICINE

## 2024-03-20 PROCEDURE — 25010000002 HEPARIN (PORCINE) PER 1000 UNITS: Performed by: STUDENT IN AN ORGANIZED HEALTH CARE EDUCATION/TRAINING PROGRAM

## 2024-03-20 PROCEDURE — 94664 DEMO&/EVAL PT USE INHALER: CPT

## 2024-03-20 PROCEDURE — 63710000001 PREDNISONE PER 1 MG: Performed by: FAMILY MEDICINE

## 2024-03-20 PROCEDURE — 80076 HEPATIC FUNCTION PANEL: CPT | Performed by: FAMILY MEDICINE

## 2024-03-20 PROCEDURE — 94761 N-INVAS EAR/PLS OXIMETRY MLT: CPT

## 2024-03-20 PROCEDURE — 97161 PT EVAL LOW COMPLEX 20 MIN: CPT

## 2024-03-20 PROCEDURE — 83735 ASSAY OF MAGNESIUM: CPT | Performed by: FAMILY MEDICINE

## 2024-03-20 PROCEDURE — 82948 REAGENT STRIP/BLOOD GLUCOSE: CPT

## 2024-03-20 PROCEDURE — 85025 COMPLETE CBC W/AUTO DIFF WBC: CPT | Performed by: FAMILY MEDICINE

## 2024-03-20 RX ORDER — METOPROLOL SUCCINATE 25 MG/1
12.5 TABLET, EXTENDED RELEASE ORAL EVERY 12 HOURS SCHEDULED
Status: DISCONTINUED | OUTPATIENT
Start: 2024-03-20 | End: 2024-03-22 | Stop reason: HOSPADM

## 2024-03-20 RX ADMIN — METOPROLOL SUCCINATE 12.5 MG: 25 TABLET, EXTENDED RELEASE ORAL at 21:42

## 2024-03-20 RX ADMIN — HEPARIN SODIUM 5000 UNITS: 5000 INJECTION INTRAVENOUS; SUBCUTANEOUS at 21:42

## 2024-03-20 RX ADMIN — FUROSEMIDE 60 MG: 40 TABLET ORAL at 09:40

## 2024-03-20 RX ADMIN — INSULIN LISPRO 3 UNITS: 100 INJECTION, SOLUTION INTRAVENOUS; SUBCUTANEOUS at 17:16

## 2024-03-20 RX ADMIN — ALLOPURINOL 100 MG: 100 TABLET ORAL at 09:38

## 2024-03-20 RX ADMIN — Medication 10 ML: at 21:42

## 2024-03-20 RX ADMIN — BUDESONIDE 0.5 MG: 0.5 INHALANT RESPIRATORY (INHALATION) at 19:32

## 2024-03-20 RX ADMIN — ROPINIROLE HYDROCHLORIDE 2 MG: 1 TABLET, FILM COATED ORAL at 21:42

## 2024-03-20 RX ADMIN — BUDESONIDE 0.5 MG: 0.5 INHALANT RESPIRATORY (INHALATION) at 08:41

## 2024-03-20 RX ADMIN — TRAZODONE HYDROCHLORIDE 100 MG: 100 TABLET ORAL at 21:42

## 2024-03-20 RX ADMIN — ROPINIROLE HYDROCHLORIDE 1 MG: 1 TABLET, FILM COATED ORAL at 12:26

## 2024-03-20 RX ADMIN — INSULIN LISPRO 2 UNITS: 100 INJECTION, SOLUTION INTRAVENOUS; SUBCUTANEOUS at 22:11

## 2024-03-20 RX ADMIN — HEPARIN SODIUM 5000 UNITS: 5000 INJECTION INTRAVENOUS; SUBCUTANEOUS at 05:42

## 2024-03-20 RX ADMIN — ROPINIROLE HYDROCHLORIDE 1 MG: 1 TABLET, FILM COATED ORAL at 07:51

## 2024-03-20 RX ADMIN — ASPIRIN 81 MG: 81 TABLET, COATED ORAL at 09:38

## 2024-03-20 RX ADMIN — PREDNISONE 40 MG: 20 TABLET ORAL at 07:51

## 2024-03-20 RX ADMIN — ATORVASTATIN CALCIUM 40 MG: 40 TABLET, FILM COATED ORAL at 21:42

## 2024-03-20 RX ADMIN — Medication 10 ML: at 09:43

## 2024-03-20 NOTE — PLAN OF CARE
Goal Outcome Evaluation:  Plan of Care Reviewed With: (P) patient        Progress: (P) improving  Outcome Evaluation: (P) pt presents to therapy today with some weakness, balance, and endurance deficits present. Pt will benefit from skilled PT to address above deficits.      Anticipated Discharge Disposition (PT): (P) home with outpatient therapy services

## 2024-03-20 NOTE — PROGRESS NOTES
[facility]   Hospitalist Progress Note  Date: 3/20/2024  Patient Name: Stefania Molina  : 1955  MRN: [id number]  Date of admission: 3/16/2024      Subjective   Subjective       Chief complaint: Shortness of breath    Summary:  68-year-old female with hepatitis C, CAD status post PCI, nonischemic cardiomyopathy, heart failure reduced ejection fraction, with documented intolerance to Entresto, previously refused to take aspirin and statin, left bundle branch block, diabetes mellitus, gout, hypertension, history of DCIS left breast, untreated, being followed as outpatient, history of CVA with left-sided weakness, hospitalized on 3/16/2024 for chief complaint of shortness of breath, with recent environmental exposures 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  Cardiology and nephrology consulted with underlying CHF, and worsening renal function with diuresis.  Limited echo showing worsening cardiac function EF 26 to 30%.    Interval follow-up: Seen and examined this morning, no acute distress, no acute major new events, breathing continues to improve, creatinine continues to improve further, down to 1.7.  BUN 67, white blood cell count 10,000, hemoglobin 10.6.  Living situation is dire.  Discussed with .  Voiding well without difficulty.  3 L of urine output over the past 24 hours.  Net -2.2 L.  Telemetry reviewed, no acute major rhythmic events.  Remains short of air with exertional activity.    Review of systems:  All systems reviewed and negative except for weakness, fatigue, cough, shortness of breath with exertional activity    Objective   Objective     Vitals:   Temp:  [97.2 °F  (36.2 °C)-98.2 °F (36.8 °C)] 97.3 °F (36.3 °C)  Heart Rate:  [76-87] 87  Resp:  [18-20] 20  BP: (111-139)/(53-76) 137/76  Physical Exam    Constitutional: Awake, alert, no acute distress, laying in bed   Eyes: Pupils equal, sclerae anicteric, no conjunctival injection   HENT: NCAT, mucous membranes moist   Neck: Supple, full range of motion   Respiratory: Diminished breath sounds with scattered rhonchi, very fine crackles at the bases   Cardiovascular: RRR, no murmurs, rubs, or gallops, palpable pedal pulses bilaterally   Gastrointestinal: Positive bowel sounds, soft, nontender, nondistended   Musculoskeletal: Trace bilateral ankle edema, no clubbing or cyanosis to extremities   Psychiatric: Appropriate affect, cooperative   Neurologic: Oriented x 3, strength symmetric in all extremities, Cranial Nerves grossly intact to confrontation, speech clear   Skin: No rashes visible on exposed skin      Result Review    Result Review:  I have personally reviewed the pertinent results from the past 24 hours to 3/20/2024 16:44 EDT and agree with these findings:  [x]  Laboratory   CBC          3/18/2024    04:44 3/19/2024    04:10 3/20/2024    04:49   CBC   WBC 12.63  10.53  10.06    RBC 3.38  3.43  3.59    Hemoglobin 10.0  10.0  10.6    Hematocrit 30.8  31.8  32.4    MCV 91.1  92.7  90.3    MCH 29.6  29.2  29.5    MCHC 32.5  31.4  32.7    RDW 13.5  13.3  13.1    Platelets 201  202  219      BMP          3/18/2024    04:44 3/19/2024    04:10 3/20/2024    04:49   BMP   BUN 62  65  67    Creatinine 2.28  1.90  1.73    Sodium 137  135  136    Potassium 4.9  4.6  4.6    Chloride 100  100  98    CO2 23.2  22.6  24.4    Calcium 9.2  8.9  9.1      LIVER FUNCTION TESTS:      Lab 03/20/24  0449 03/19/24  0410 03/18/24  0444 03/16/24  1041   TOTAL PROTEIN 6.8 6.7 6.9 6.9   ALBUMIN 3.8 3.7 3.8 3.8   GLOBULIN  --   --   --  3.1   ALT (SGPT) 18 17 13 13   AST (SGOT) 15 17 20 16   BILIRUBIN 0.3 0.2 0.2 0.5   BILIRUBIN DIRECT <0.2 <0.2 <0.2   --    ALK PHOS 73 73 [phone] 89       [x]  Microbiology   Microbiology Results (last 10 days)       Procedure Component Value - Date/Time    Urine Culture - Urine, Urine, Clean Catch [[id number]]  (Normal) Collected: 03/18/24 1022    Lab Status: Final result Specimen: Urine, Clean Catch Updated: 03/19/24 0936     Urine Culture No growth    COVID PRE-OP / PRE-PROCEDURE SCREENING ORDER (NO ISOLATION) - Swab, Nasopharynx [[id number]]  (Normal) Collected: 03/16/24 1612    Lab Status: Final result Specimen: Swab from Nasopharynx Updated: 03/16/24 1658    Narrative:      The following orders were created for panel order COVID PRE-OP / PRE-PROCEDURE SCREENING ORDER (NO ISOLATION) - Swab, Nasopharynx.  Procedure                               Abnormality         Status                     ---------                               -----------         ------                     COVID-19, FLU A/B, RSV P...[[id number]]  Normal              Final result                 Please view results for these tests on the individual orders.    COVID-19, FLU A/B, RSV PCR 1 HR TAT - Swab, Nasopharynx [[id number]]  (Normal) Collected: 03/16/24 1612    Lab Status: Final result Specimen: Swab from Nasopharynx Updated: 03/16/24 1658     COVID19 Not Detected     Influenza A PCR Not Detected     Influenza B PCR Not Detected     RSV, PCR Not Detected    Narrative:      Fact sheet for providers: https://www.fda.gov/media/440956/download    Fact sheet for patients: https://www.fda.gov/media/502561/download    Test performed by PCR.              [x]  Radiology US Renal Bilateral    Result Date: 3/18/2024    1. Grossly unremarkable appearance of the kidneys bilaterally 2. Nonvisualization of the bladder      TAVARES REAL MD       Electronically Signed and Approved By: TAVARES REAL MD on 3/18/2024 at 14:36             US Liver    Result Date: 3/18/2024    1. No gallstones or biliary obstruction.  The gallbladder is partially contracted and there is  borderline thickening of the wall.  This wall thickening may be secondary to contraction rather than cholecystitis. 2. Small right renal cyst. 3. No liver mass.       ILIA RUIZ MD       Electronically Signed and Approved By: ILIA RUIZ MD on 3/18/2024 at 6:25             CT Chest With Contrast Diagnostic    Result Date: 3/16/2024    1. Allowing for the limitation of mild respiratory motion artifact, no pulmonary emboli are seen. 2. Moderate-sized bilateral pleural effusions with partial underlying bilateral lower lobe compressive atelectasis. 3. Mild scattered bilateral ground-glass opacities, which may be due to pulmonary edema or infection. 4. Abnormal appearance of the partially included left breast with skin thickening, mass, and calcifications.  Findings are concerning for malignancy or potentially post treatment changes.  There is no previous breast imaging for comparison.  Recommend correlation with the patient's history and physical exam.  If this is an unexpected finding, recommend further evaluation with outpatient bilateral diagnostic mammogram and breast ultrasound.     JINA VEGAS MD       Electronically Signed and Approved By: JINA VEGAS MD on 3/16/2024 at 12:56             XR Chest 1 View    Result Date: 3/16/2024    New airspace opacities in both lower lungs, concerning for pneumonia and/or pulmonary edema with pleural effusions.       JINA VEGAS MD       Electronically Signed and Approved By: JINA VEGAS MD on 3/16/2024 at 11:14                [x]  EKG/Telemetry   ECG 12 Lead ED Triage Standing Order; SOA   Final Result   HEART RATE= 85  bpm   RR Interval= 708  ms   MA Interval= 157  ms   P Horizontal Axis=   deg   P Front Axis= 61  deg   QRSD Interval= 129  ms   QT Interval= 405  ms   QTcB= 481  ms   QRS Axis= -23  deg   T Wave Axis= 30  deg   - ABNORMAL ECG -   Sinus rhythm   Probable left atrial enlargement   Left bundle branch block   When compared with ECG of  22-Feb-2024 20:40:31,   No significant change   Electronically Signed By: Jarod Yeh ([facility]) 16-Mar-2024 21:16:12   Date and Time of Study: 2024-03-16 10:39:58                [x]  Cardiology/Vascular   []  Pathology  [x]  Old records  []  Other:    Assessment & Plan   Assessment / Plan     Assessment/Plan:    Assessment:  Acute decompensation of chronic systolic heart failure  Left bundle branch block  CKD stage IIIa  Bilateral pleural effusions  NSTEMI due to decompensation of heart failure  Questionable asthma exacerbation due to environmental exposures  Left breast DCIS; untreated, needs follow-up  Hep C positive antibody  CAD with history of PCI  Nonischemic cardiomyopathy  Heart failure reduced ejection fraction  Previously intolerant to Entresto due to hypotension  Previously off aspirin and statin due to personal choices  Diabetes mellitus  Gout  Essential hypertension    Plan:  Labs and imaging reviewed  Out of bed to chair  PT/OT  Finishing prednisone today  Continue Lasix 60 mg p.o. daily  Continue holding Jardiance  Had worsening renal function, consulted nephrology, discussed with Dr. Estrada, recommendations appreciated; continued monitoring renal function to ensure stability  Cardiology consulted discussed with Dr. Cortez, recommendations appreciated; patient cannot tolerate Entresto; the best bet is keeping her on low-dose beta-blockers; will follow-up as outpatient  Continue Pulmicort nebs twice daily  Needs follow-up with the heart failure clinic she remains local in the short-term while she is in [place], plans to move to [place]  Continue aspirin 81 mg daily  Continue atorvastatin 40 mg nightly  Continue metoprolol XL 12.5 mg daily  Monitor on telemetry  Strict I's and O's  Daily weights  A.m. labs   involved for safe disposition planning  Full code  DVT prophylaxis with heparin  Clinical course dictate further management  Discussed with nurse at bedside    DVT  prophylaxis:  Medical DVT prophylaxis orders are present.        CODE STATUS:   Level Of Support Discussed With: Patient  Code Status (Patient has no pulse and is not breathing): CPR (Attempt to Resuscitate)  Medical Interventions (Patient has pulse or is breathing): Full Support    Electronically signed by Donald Clark MD, 03/20/24, 4:46 PM EDT.  Portions of this documentation were transcribed electronically from a voice recognition software.  I confirm all data accurately represents the service(s) I performed at today's visit.

## 2024-03-20 NOTE — PROGRESS NOTES
" LOS: 4 days   Patient Care Team:  Angie Orourke APRN as PCP - General (Internal Medicine & Pediatrics)    Chief Complaint: NARGIS    Subjective     History of Present Illness  Pt without any acute complaints.  Breathing improved now.  Voiding well.  She is a retired nurse.    Subjective:  Symptoms:  Improved.  No shortness of breath, chest pain, chest pressure or anxiety.    Diet:  No nausea or vomiting.    Pain:  She reports no pain.        History taken from: patient chart RN    Objective     Vital Sign Min/Max for last 24 hours  Temp  Min: 97.2 °F (36.2 °C)  Max: 98.2 °F (36.8 °C)   BP  Min: 111/53  Max: 139/71   Pulse  Min: 76  Max: 87   Resp  Min: 18  Max: 20   SpO2  Min: 95 %  Max: 100 %   No data recorded   Weight  Min: 88 kg (194 lb 0.1 oz)  Max: 88 kg (194 lb 0.1 oz)     Flowsheet Rows      Flowsheet Row First Filed Value   Admission Height 160 cm (63\") Documented at 03/16/2024 1014   Admission Weight 87.5 kg (192 lb 14.4 oz) Documented at 03/16/2024 1014            I/O this shift:  In: 480 [P.O.:480]  Out: -   I/O last 3 completed shifts:  In: 840 [P.O.:840]  Out: 4850 [Urine:4850]    Objective:  General Appearance:  Comfortable, in no acute distress and not in pain.    Vital signs: (most recent): Blood pressure 137/76, pulse 87, temperature 97.3 °F (36.3 °C), temperature source Oral, resp. rate 20, height 160 cm (63\"), weight 88 kg (194 lb 0.1 oz), SpO2 100%.  Vital signs are normal.  No fever.    Output: Producing urine.    HEENT: Normal HEENT exam.    Lungs:  Normal effort and normal respiratory rate.    Heart: Normal rate.  Regular rhythm.    Abdomen: Abdomen is soft.  Bowel sounds are normal.   There is no abdominal tenderness.     Extremities: Normal range of motion.  There is no dependent edema.    Pulses: Distal pulses are intact.    Neurological: Patient is alert and oriented to person, place and time.    Pupils:  Pupils are equal, round, and reactive to light.    Skin:  Warm and dry.  " "              Results Review:     I reviewed the patient's new clinical results.  I reviewed the patient's new imaging results and agree with the interpretation.  I reviewed the patient's other test results and agree with the interpretation    WBC WBC   Date Value Ref Range Status   03/20/2024 10.06 3.40 - 10.80 10*3/mm3 Final   03/19/2024 10.53 3.40 - 10.80 10*3/mm3 Final   03/18/2024 12.63 (H) 3.40 - 10.80 10*3/mm3 Final      HGB Hemoglobin   Date Value Ref Range Status   03/20/2024 10.6 (L) 12.0 - 15.9 g/dL Final   03/19/2024 10.0 (L) 12.0 - 15.9 g/dL Final   03/18/2024 10.0 (L) 12.0 - 15.9 g/dL Final      HCT Hematocrit   Date Value Ref Range Status   03/20/2024 32.4 (L) 34.0 - 46.6 % Final   03/19/2024 31.8 (L) 34.0 - 46.6 % Final   03/18/2024 30.8 (L) 34.0 - 46.6 % Final      Platlets No results found for: \"LABPLAT\"   MCV MCV   Date Value Ref Range Status   03/20/2024 90.3 79.0 - 97.0 fL Final   03/19/2024 92.7 79.0 - 97.0 fL Final   03/18/2024 91.1 79.0 - 97.0 fL Final          Sodium Sodium   Date Value Ref Range Status   03/20/2024 136 136 - 145 mmol/L Final   03/19/2024 135 (L) 136 - 145 mmol/L Final   03/18/2024 137 136 - 145 mmol/L Final      Potassium Potassium   Date Value Ref Range Status   03/20/2024 4.6 3.5 - 5.2 mmol/L Final   03/19/2024 4.6 3.5 - 5.2 mmol/L Final   03/18/2024 4.9 3.5 - 5.2 mmol/L Final     Comment:     Slight hemolysis detected by analyzer. Result may be falsely elevated.      Chloride Chloride   Date Value Ref Range Status   03/20/2024 98 98 - 107 mmol/L Final   03/19/2024 100 98 - 107 mmol/L Final   03/18/2024 100 98 - 107 mmol/L Final      CO2 CO2   Date Value Ref Range Status   03/20/2024 24.4 22.0 - 29.0 mmol/L Final   03/19/2024 22.6 22.0 - 29.0 mmol/L Final   03/18/2024 23.2 22.0 - 29.0 mmol/L Final      BUN BUN   Date Value Ref Range Status   03/20/2024 67 (H) 8 - 23 mg/dL Final   03/19/2024 65 (H) 8 - 23 mg/dL Final   03/18/2024 62 (H) 8 - 23 mg/dL Final      Creatinine " "Creatinine   Date Value Ref Range Status   03/20/2024 1.73 (H) 0.57 - 1.00 mg/dL Final   03/19/2024 1.90 (H) 0.57 - 1.00 mg/dL Final   03/18/2024 2.28 (H) 0.57 - 1.00 mg/dL Final      Calcium Calcium   Date Value Ref Range Status   03/20/2024 9.1 8.6 - 10.5 mg/dL Final   03/19/2024 8.9 8.6 - 10.5 mg/dL Final   03/18/2024 9.2 8.6 - 10.5 mg/dL Final      PO4 No results found for: \"CAPO4\"   Albumin Albumin   Date Value Ref Range Status   03/20/2024 3.8 3.5 - 5.2 g/dL Final   03/19/2024 3.7 3.5 - 5.2 g/dL Final   03/18/2024 3.8 3.5 - 5.2 g/dL Final      Magnesium Magnesium   Date Value Ref Range Status   03/20/2024 2.1 1.6 - 2.4 mg/dL Final   03/19/2024 2.0 1.6 - 2.4 mg/dL Final   03/18/2024 2.1 1.6 - 2.4 mg/dL Final      Uric Acid Uric Acid   Date Value Ref Range Status   03/18/2024 6.7 (H) 2.4 - 5.7 mg/dL Final        Medication Review:   allopurinol, 100 mg, Oral, Daily  aspirin, 81 mg, Oral, Daily  atorvastatin, 40 mg, Oral, Nightly  budesonide, 0.5 mg, Nebulization, BID - RT  [Held by provider] empagliflozin, 10 mg, Oral, Daily  furosemide, 60 mg, Oral, Daily  heparin (porcine), 5,000 Units, Subcutaneous, Q8H  insulin lispro, 2-7 Units, Subcutaneous, 4x Daily AC & at Bedtime  ipratropium-albuterol, 3 mL, Nebulization, 4x Daily - RT  metoprolol succinate XL, 12.5 mg, Oral, Q12H  predniSONE, 40 mg, Oral, Daily With Breakfast  rOPINIRole, 1 mg, Oral, Daily With Lunch  rOPINIRole, 1 mg, Oral, QAM  rOPINIRole, 2 mg, Oral, Nightly  sodium chloride, 10 mL, Intravenous, Q12H  traZODone, 100 mg, Oral, Nightly          Assessment & Plan       Acute decompensated heart failure      Assessment & Plan  - Acute kidney injury, etiology is uncertain possibly related to diuresis versus CT contrast on 3/16/2024.  Creatinine improved to 1.70 today.  UA benign without blood or protein.   Farxiga and spironolactone are on hold now.  Continue lasix 60mg po qday.  - Chronic kidney disease stage IIIb with baseline creatinine 1.7-1.9 from " previous records.  This is likely related to diabetic hypertensive nephropathy.  - Type 2 diabetes since 1996 with known diabetic retinopathy and neuropathy.  - Hypertension, blood pressure is acceptable.  - History of congestive heart failure, 2D echo showing reduced ejection fraction with LVH, being evaluated by cardiology.  - History of hepatitis C.  - Right renal cyst noted on imaging.  - Abnormal breast imaging, per primary.  - Gout-  on allopurinol.  Uric acid at goal.    From renal standpoint, okay to discharge, okay to resume dapagliflozin and spironolactone upon discharge.  ARB should be considered in the future given reduced ejection fraction and proteinuria.  If interested, I will be happy to see her in office for follow-up but she is probably going back to [place] soon.  Contact information provided.  Please call with any questions.    Giles Estrada MD  03/20/24  16:05 EDT

## 2024-03-20 NOTE — THERAPY EVALUATION
Acute Care - Physical Therapy Initial Evaluation  [facility]     Patient Name: Stefania Molina  : 1955  MRN: [id number]  Today's Date: 3/20/2024 Admit date: 3/16/2024     Referring Physician: Donald Clark MD     Surgery Date:* No surgery found *          Visit Dx:     ICD-10-CM ICD-9-CM   1. Congestive heart failure, unspecified HF chronicity, unspecified heart failure type  I50.9 428.0   2. Difficulty in walking  R26.2 719.7     Patient Active Problem List   Diagnosis    Class 1 obesity due to excess calories without serious comorbidity with body mass index (BMI) of 30.0 to 30.9 in adult    Essential hypertension    CAD    Irritable bowel syndrome with diarrhea    Edentulous    Positive hepatitis C antibody test    Refusal of statin medication by patient    Asthma    Chronic bronchitis    Chronic systolic heart failure    Deviated nasal septum    Chronic gout without tophus    Hyperlipidemia    Insomnia    Restless legs syndrome    Type 2 diabetes mellitus    Varicose veins of lower extremity    NICM (nonischemic cardiomyopathy)    Acute decompensated heart failure     Past Medical History:   Diagnosis Date    CAD 10/18/2023    CHF (congestive heart failure)     Diabetes mellitus     Fluid retention     Gout     Hypertension     Stroke     Tachycardia      Past Surgical History:   Procedure Laterality Date    TONSILLECTOMY AND ADENOIDECTOMY       PT Assessment (Last 12 Hours)       PT Evaluation and Treatment       Row Name 24 1051          Physical Therapy Time and Intention    Subjective Information no complaints (P)   -NM     Document Type evaluation (P)   -NM     Mode of Treatment individual therapy;physical therapy (P)   -NM     Patient Effort excellent (P)   -NM     Symptoms Noted During/After Treatment none (P)   -NM       Row Name 24 1051          General Information    Patient Profile Reviewed yes (P)   -NM     Patient Observations alert;cooperative;agree to therapy (P)   -NM     Prior  Level of Function independent: (P)   -NM     Equipment Currently Used at Home none (P)   -NM     Existing Precautions/Restrictions no known precautions/restrictions (P)   -NM     Barriers to Rehab none identified (P)   -NM       Row Name 03/20/24 1051          Living Environment    Current Living Arrangements home (P)   -NM     People in Home sibling(s);other relative(s);other (see comments) (P)   nephews and niecces  -NM     Primary Care Provided by self (P)   -NM       Row Name 03/20/24 1051          Home Use of Assistive/Adaptive Equipment    Equipment Currently Used at Home none (P)   -NM       Row Name 03/20/24 1051          Pain    Pretreatment Pain Rating 0/10 - no pain (P)   -NM     Posttreatment Pain Rating 0/10 - no pain (P)   -NM       Row Name 03/20/24 1051          Range of Motion (ROM)    Range of Motion bilateral lower extremities;ROM is WFL (P)   -NM       Row Name 03/20/24 1051          Strength (Manual Muscle Testing)    Strength (Manual Muscle Testing) bilateral lower extremities (P)   4+/5  -NM       Row Name 03/20/24 1051          Bed Mobility    Bed Mobility supine-sit;sit-supine (P)   -NM     Supine-Sit [place] (Bed Mobility) independent (P)   -NM     Sit-Supine [place] (Bed Mobility) independent (P)   -NM       Row Name 03/20/24 1051          Transfers    Transfers sit-stand transfer;stand-sit transfer (P)   -NM       Row Name 03/20/24 1051          Sit-Stand Transfer    Sit-Stand [place] (Transfers) standby assist (P)   -NM       Row Name 03/20/24 1051          Stand-Sit Transfer    Stand-Sit [place] (Transfers) standby assist (P)   -NM       Row Name 03/20/24 1051          Gait/Stairs (Locomotion)    Gait/Stairs Locomotion gait/ambulation independence (P)   -NM     [place] Level (Gait) contact guard (P)   -NM     Patient was able to Ambulate yes (P)   -NM     Distance in Feet (Gait) 80 (P)   -NM     Pattern (Gait) step-through (P)   -NM     Deviations/Abnormal  Patterns (Gait) bilateral deviations;base of support, narrow;tyron decreased;gait speed decreased;stride length decreased;weight shifting decreased (P)   -NM     Bilateral Gait Deviations lateral trunk flexion;decreased arm swing (P)   -NM       Row Name 03/20/24 1051          Safety Issues, Functional Mobility    Impairments Affecting Function (Mobility) balance;endurance/activity tolerance;strength (P)   -NM       Row Name 03/20/24 1051          Balance    Balance Assessment standing dynamic balance (P)   -NM     Dynamic Standing Balance contact guard (P)   -NM       Row Name 03/20/24 1051          Plan of Care Review    Plan of Care Reviewed With patient (P)   -NM     Progress improving (P)   -NM     Outcome Evaluation pt presents to therapy today with some weakness, balance, and endurance deficits present. Pt will benefit from skilled PT to address above deficits. (P)   -NM       Row Name 03/20/24 1051          Positioning and Restraints    Pre-Treatment Position in bed (P)   -NM     Post Treatment Position bed (P)   -NM     In Bed sitting;call light within reach;encouraged to call for assist;exit alarm on (P)   -NM       Row Name 03/20/24 1051          Therapy Assessment/Plan (PT)    Patient/Family Therapy Goals Statement (PT) pt wants to ambulate independently again (P)   -NM     Rehab Potential (PT) good, to achieve stated therapy goals (P)   -NM     Criteria for Skilled Interventions Met (PT) meets criteria (P)   -NM     Therapy Frequency (PT) daily (P)   -NM     Predicted Duration of Therapy Intervention (PT) 10 days (P)   -NM     Problem List (PT) problems related to;balance;mobility;strength (P)   -NM     Activity Limitations Related to Problem List (PT) unable to ambulate safely;unable to transfer safely (P)   -NM       Row Name 03/20/24 1051          PT Evaluation Complexity    History, PT Evaluation Complexity 3 or more personal factors and/or comorbidities (P)   -NM     Examination of Body Systems  (PT Eval Complexity) total of 4 or more elements (P)   -NM     Clinical Presentation (PT Evaluation Complexity) stable (P)   -NM     Clinical Decision Making (PT Evaluation Complexity) low complexity (P)   -NM     Overall Complexity (PT Evaluation Complexity) low complexity (P)   -NM       Row Name 03/20/24 1051          Therapy Plan Review/Discharge Plan (PT)    Therapy Plan Review (PT) evaluation/treatment results reviewed;care plan/treatment goals reviewed (P)   -NM       Row Name 03/20/24 1051          Physical Therapy Goals    Gait Training Goal Selection (PT) gait training, PT goal 1 (P)   -NM       Row Name 03/20/24 1051          Gait Training Goal 1 (PT)    Activity/Assistive Device (Gait Training Goal 1, PT) gait (walking locomotion) (P)   -NM     [place] Level (Gait Training Goal 1, PT) independent (P)   -NM     Distance (Gait Training Goal 1, PT) 250ft (P)   -NM     Time Frame (Gait Training Goal 1, PT) 10 days (P)   -NM               User Key  (r) = Recorded By, (t) = Taken By, (c) = Cosigned By      Initials Name Provider Type    NM Jack Travis, PT Student PT Student                    Physical Therapy Education       Title: PT OT SLP Therapies (Done)       Topic: Physical Therapy (Done)       Point: Mobility training (Done)       Learning Progress Summary             Patient Acceptance, E,TB, VU by NM at 3/20/2024 1058                                         User Key       Initials Effective Dates Name Provider Type Discipline    NM 01/31/24 -  Jack Travis, PT Student PT Student PT                  PT Recommendation and Plan  Anticipated Discharge Disposition (PT): (P) home with outpatient therapy services  Planned Therapy Interventions (PT): (P) balance training, gait training, transfer training, strengthening  Therapy Frequency (PT): (P) daily  Plan of Care Reviewed With: (P) patient  Progress: (P) improving  Outcome Evaluation: (P) pt presents to therapy today with some weakness,  balance, and endurance deficits present. Pt will benefit from skilled PT to address above deficits.   Outcome Measures       Row Name 03/20/24 1000             How much help from another person do you currently need...    Turning from your back to your side while in flat bed without using bedrails? 4 (P)   -NM      Moving from lying on back to sitting on the side of a flat bed without bedrails? 4 (P)   -NM      Moving to and from a bed to a chair (including a wheelchair)? 3 (P)   -NM      Standing up from a chair using your arms (e.g., wheelchair, bedside chair)? 4 (P)   -NM      Climbing 3-5 steps with a railing? 3 (P)   -NM      To walk in hospital room? 3 (P)   -NM      AM-PAC 6 Clicks Score (PT) 21 (P)   -NM      Highest Level of Mobility Goal 6 --> Walk 10 steps or more (P)   -NM                User Key  (r) = Recorded By, (t) = Taken By, (c) = Cosigned By      Initials Name Provider Type    Jack Rey, PT Student PT Student                     Time Calculation:    PT Charges       Row Name 03/20/24 1050             Time Calculation    PT Received On 03/20/24 (P)   -NM      PT Goal Re-Cert Due Date 03/29/24 (P)   -NM         Untimed Charges    PT Eval/Re-eval Minutes 28 (P)   -NM         Total Minutes    Untimed Charges Total Minutes 28 (P)   -NM       Total Minutes 28 (P)   -NM                User Key  (r) = Recorded By, (t) = Taken By, (c) = Cosigned By      Initials Name Provider Type    Jack Rey PT Student PT Student                  Therapy Charges for Today       Code Description Service Date Service Provider Modifiers Qty    55610988397 HC PT EVAL LOW COMPLEXITY 2 3/20/2024 Jack Travis, PT Student GP 1            PT G-Codes  AM-PAC 6 Clicks Score (PT): (P) 21    Jack Travis PT Student  3/20/2024

## 2024-03-20 NOTE — PROGRESS NOTES
[facility]   Cardiology Progress Note      Patient Name: Stefania Molina  : 1955  MRN: [id number]  Primary Care Physician:  Angie Orourke APRN  Referring Physician: No ref. provider found  Date of admission: 3/16/2024    Subjective   Subjective     Chief Complaint: Less short of breath    HPI:  Stefania Molina is a 68 y.o. female admitted with shortness of breath and CHF.  Feels better.        Objective    Objective     Vitals:   Vitals:    24 0345 24 0500 24 0749 24 0845   BP: 135/67  130/75    BP Location: Right arm  Right arm    Patient Position: Lying  Sitting    Pulse: 82  83 83   Resp:    Temp: 97.3 °F (36.3 °C)  97.2 °F (36.2 °C)    TempSrc: Oral  Oral    SpO2: 99%  97% 95%   Weight:  88 kg (194 lb 0.1 oz)     Height:                Physical Exam:   Constitutional: Awake, alert, No acute distress    Eyes: PERRLA, sclerae anicteric, no conjunctival injection   HENT: NCAT, mucous membranes moist   Neck: Supple, no thyromegaly, no lymphadenopathy, trachea midline   Respiratory: Decreased to auscultation bilaterally, nonlabored respirations    Cardiovascular: RRR, no murmurs, rubs, or gallops, palpable pedal pulses bilaterally         Current medications:  allopurinol, 100 mg, Oral, Daily  aspirin, 81 mg, Oral, Daily  atorvastatin, 40 mg, Oral, Nightly  budesonide, 0.5 mg, Nebulization, BID - RT  [Held by provider] empagliflozin, 10 mg, Oral, Daily  furosemide, 60 mg, Oral, Daily  heparin (porcine), 5,000 Units, Subcutaneous, Q8H  insulin lispro, 2-7 Units, Subcutaneous, 4x Daily AC & at Bedtime  ipratropium-albuterol, 3 mL, Nebulization, 4x Daily - RT  metoprolol succinate XL, 12.5 mg, Oral, Q24H  predniSONE, 40 mg, Oral, Daily With Breakfast  rOPINIRole, 1 mg, Oral, Daily With Lunch  rOPINIRole, 1 mg, Oral, QAM  rOPINIRole, 2 mg, Oral, Nightly  sodium chloride, 10 mL, Intravenous, Q12H  traZODone, 100 mg, Oral, Nightly      Current IV drips:       Result Review     Result Review:  I have personally reviewed the results from the time of this admission to 3/20/2024 09:05 EDT and agree with these findings:  []  Laboratory  []  EKG/Telemetry   []  Cardiology/Vascular   []  Radiology         CBC          3/18/2024    04:44 3/19/2024    04:10 3/20/2024    04:49   CBC   WBC 12.63  10.53  10.06    RBC 3.38  3.43  3.59    Hemoglobin 10.0  10.0  10.6    Hematocrit 30.8  31.8  32.4    MCV 91.1  92.7  90.3    MCH 29.6  29.2  29.5    MCHC 32.5  31.4  32.7    RDW 13.5  13.3  13.1    Platelets 201  202  219      CMP          3/18/2024    04:44 3/19/2024    04:10 3/20/2024    04:49   CMP   Glucose 144  168  148    BUN 62  65  67    Creatinine 2.28  1.90  1.73    EGFR 22.6  28.1  31.5    Sodium 137  135  136    Potassium 4.9  4.6  4.6    Chloride 100  100  98    Calcium 9.2  8.9  9.1    Total Protein 6.9  6.7  6.8    Albumin 3.8  3.7  3.8    Total Bilirubin 0.2  0.2  0.3    Alkaline Phosphatase 77  73  73    AST (SGOT) 20  17  15    ALT (SGPT) 13  17  18    BUN/Creatinine Ratio 27.2  34.2  38.7    Anion Gap 13.8  12.4  13.6      Results for orders placed during the hospital encounter of 03/16/24    Adult Transthoracic Echo Complete w/ Color, Spectral and Contrast if necessary per protocol    Interpretation Summary    Left ventricular ejection fraction appears to be 26 - 30%.    Left ventricular wall thickness is consistent with mild concentric hypertrophy.    Left ventricular diastolic function is consistent with (grade III w/high LAP) reversible restrictive pattern.    The left atrial cavity is moderate to severely dilated.    Moderate mitral valve regurgitation is present.    Estimated right ventricular systolic pressure from tricuspid regurgitation is markedly elevated (>55 mmHg).        Lab Results   Component Value Date    PROBNP 16,037.0 (H) 03/16/2024         Telemetry reviewed     Assessment / Plan     ASSESSMENT:    Acute decompensated heart failure  Acute on chronic systolic heart  failure secondary to nonischemic cardiomyopathy.  Stable CAD.  Chronic kidney disease stage IIIa      PLAN:  1.  Continue p.o. Lasix.  Increase Toprol-XL.  2.  Continue current management for renal failure.  3.  Follow-up in the office as an outpatient.  4.  Will see as needed.    Electronically signed by Paco Cortez MD, 03/20/24, 9:05 AM EDT.

## 2024-03-20 NOTE — PLAN OF CARE
Goal Outcome Evaluation:   No acute changes during shift.

## 2024-03-21 LAB
ALBUMIN SERPL-MCNC: 3.8 G/DL (ref 3.5–5.2)
ALP SERPL-CCNC: 73 U/L (ref 39–117)
ALT SERPL W P-5'-P-CCNC: 18 U/L (ref 1–33)
ANION GAP SERPL CALCULATED.3IONS-SCNC: 14.5 MMOL/L (ref 5–15)
AST SERPL-CCNC: 13 U/L (ref 1–32)
BASOPHILS # BLD AUTO: 0.02 10*3/MM3 (ref 0–0.2)
BASOPHILS NFR BLD AUTO: 0.2 % (ref 0–1.5)
BILIRUB CONJ SERPL-MCNC: <0.2 MG/DL (ref 0–0.3)
BILIRUB INDIRECT SERPL-MCNC: NORMAL MG/DL
BILIRUB SERPL-MCNC: 0.3 MG/DL (ref 0–1.2)
BUN SERPL-MCNC: 64 MG/DL (ref 8–23)
BUN/CREAT SERPL: 35.6 (ref 7–25)
CALCIUM SPEC-SCNC: 9.2 MG/DL (ref 8.6–10.5)
CHLORIDE SERPL-SCNC: 99 MMOL/L (ref 98–107)
CO2 SERPL-SCNC: 23.5 MMOL/L (ref 22–29)
CREAT SERPL-MCNC: 1.8 MG/DL (ref 0.57–1)
CREAT UR-MCNC: 55.8 MG/DL
DEPRECATED RDW RBC AUTO: 42.3 FL (ref 37–54)
EGFRCR SERPLBLD CKD-EPI 2021: 30 ML/MIN/1.73
EOSINOPHIL # BLD AUTO: 0.01 10*3/MM3 (ref 0–0.4)
EOSINOPHIL NFR BLD AUTO: 0.1 % (ref 0.3–6.2)
EOSINOPHIL SPEC QL MICRO: 0 % EOS/100 CELLS (ref 0–0)
ERYTHROCYTE [DISTWIDTH] IN BLOOD BY AUTOMATED COUNT: 13 % (ref 12.3–15.4)
GLUCOSE BLDC GLUCOMTR-MCNC: 117 MG/DL (ref 70–99)
GLUCOSE BLDC GLUCOMTR-MCNC: 208 MG/DL (ref 70–99)
GLUCOSE BLDC GLUCOMTR-MCNC: 241 MG/DL (ref 70–99)
GLUCOSE BLDC GLUCOMTR-MCNC: 89 MG/DL (ref 70–99)
GLUCOSE SERPL-MCNC: 119 MG/DL (ref 65–99)
HCT VFR BLD AUTO: 36.9 % (ref 34–46.6)
HGB BLD-MCNC: 11.7 G/DL (ref 12–15.9)
IMM GRANULOCYTES # BLD AUTO: 0.09 10*3/MM3 (ref 0–0.05)
IMM GRANULOCYTES NFR BLD AUTO: 0.8 % (ref 0–0.5)
LYMPHOCYTES # BLD AUTO: 1.92 10*3/MM3 (ref 0.7–3.1)
LYMPHOCYTES NFR BLD AUTO: 16.6 % (ref 19.6–45.3)
MAGNESIUM SERPL-MCNC: 2.2 MG/DL (ref 1.6–2.4)
MCH RBC QN AUTO: 28.5 PG (ref 26.6–33)
MCHC RBC AUTO-ENTMCNC: 31.7 G/DL (ref 31.5–35.7)
MCV RBC AUTO: 90 FL (ref 79–97)
MONOCYTES # BLD AUTO: 0.78 10*3/MM3 (ref 0.1–0.9)
MONOCYTES NFR BLD AUTO: 6.7 % (ref 5–12)
NEUTROPHILS NFR BLD AUTO: 75.6 % (ref 42.7–76)
NEUTROPHILS NFR BLD AUTO: 8.77 10*3/MM3 (ref 1.7–7)
NRBC BLD AUTO-RTO: 0 /100 WBC (ref 0–0.2)
PHOSPHATE SERPL-MCNC: 4 MG/DL (ref 2.5–4.5)
PLATELET # BLD AUTO: 255 10*3/MM3 (ref 140–450)
PMV BLD AUTO: 10.4 FL (ref 6–12)
POTASSIUM SERPL-SCNC: 4.6 MMOL/L (ref 3.5–5.2)
PROT ?TM UR-MCNC: 10.1 MG/DL
PROT SERPL-MCNC: 6.9 G/DL (ref 6–8.5)
PROT/CREAT UR: 0.18 MG/G{CREAT}
RBC # BLD AUTO: 4.1 10*6/MM3 (ref 3.77–5.28)
SODIUM SERPL-SCNC: 137 MMOL/L (ref 136–145)
WBC NRBC COR # BLD AUTO: 11.59 10*3/MM3 (ref 3.4–10.8)

## 2024-03-21 PROCEDURE — 82948 REAGENT STRIP/BLOOD GLUCOSE: CPT

## 2024-03-21 PROCEDURE — 84156 ASSAY OF PROTEIN URINE: CPT | Performed by: INTERNAL MEDICINE

## 2024-03-21 PROCEDURE — 84100 ASSAY OF PHOSPHORUS: CPT | Performed by: FAMILY MEDICINE

## 2024-03-21 PROCEDURE — 94664 DEMO&/EVAL PT USE INHALER: CPT

## 2024-03-21 PROCEDURE — 94799 UNLISTED PULMONARY SVC/PX: CPT

## 2024-03-21 PROCEDURE — 83735 ASSAY OF MAGNESIUM: CPT | Performed by: FAMILY MEDICINE

## 2024-03-21 PROCEDURE — 63710000001 INSULIN LISPRO (HUMAN) PER 5 UNITS: Performed by: FAMILY MEDICINE

## 2024-03-21 PROCEDURE — 80076 HEPATIC FUNCTION PANEL: CPT | Performed by: FAMILY MEDICINE

## 2024-03-21 PROCEDURE — 63710000001 PREDNISONE PER 1 MG: Performed by: FAMILY MEDICINE

## 2024-03-21 PROCEDURE — 87205 SMEAR GRAM STAIN: CPT | Performed by: INTERNAL MEDICINE

## 2024-03-21 PROCEDURE — 82570 ASSAY OF URINE CREATININE: CPT | Performed by: INTERNAL MEDICINE

## 2024-03-21 PROCEDURE — 85025 COMPLETE CBC W/AUTO DIFF WBC: CPT | Performed by: FAMILY MEDICINE

## 2024-03-21 PROCEDURE — 25010000002 HEPARIN (PORCINE) PER 1000 UNITS: Performed by: STUDENT IN AN ORGANIZED HEALTH CARE EDUCATION/TRAINING PROGRAM

## 2024-03-21 PROCEDURE — 99232 SBSQ HOSP IP/OBS MODERATE 35: CPT | Performed by: FAMILY MEDICINE

## 2024-03-21 PROCEDURE — 80048 BASIC METABOLIC PNL TOTAL CA: CPT | Performed by: FAMILY MEDICINE

## 2024-03-21 RX ORDER — IPRATROPIUM BROMIDE AND ALBUTEROL SULFATE 2.5; .5 MG/3ML; MG/3ML
3 SOLUTION RESPIRATORY (INHALATION) EVERY 4 HOURS PRN
Status: DISCONTINUED | OUTPATIENT
Start: 2024-03-21 | End: 2024-03-22 | Stop reason: HOSPADM

## 2024-03-21 RX ADMIN — METOPROLOL SUCCINATE 12.5 MG: 25 TABLET, EXTENDED RELEASE ORAL at 20:04

## 2024-03-21 RX ADMIN — INSULIN LISPRO 3 UNITS: 100 INJECTION, SOLUTION INTRAVENOUS; SUBCUTANEOUS at 17:10

## 2024-03-21 RX ADMIN — HEPARIN SODIUM 5000 UNITS: 5000 INJECTION INTRAVENOUS; SUBCUTANEOUS at 21:59

## 2024-03-21 RX ADMIN — TRAZODONE HYDROCHLORIDE 100 MG: 100 TABLET ORAL at 20:05

## 2024-03-21 RX ADMIN — Medication 10 ML: at 20:04

## 2024-03-21 RX ADMIN — BUDESONIDE 0.5 MG: 0.5 INHALANT RESPIRATORY (INHALATION) at 18:55

## 2024-03-21 RX ADMIN — BUDESONIDE 0.5 MG: 0.5 INHALANT RESPIRATORY (INHALATION) at 07:23

## 2024-03-21 RX ADMIN — ALLOPURINOL 100 MG: 100 TABLET ORAL at 09:59

## 2024-03-21 RX ADMIN — INSULIN LISPRO 3 UNITS: 100 INJECTION, SOLUTION INTRAVENOUS; SUBCUTANEOUS at 20:03

## 2024-03-21 RX ADMIN — Medication 10 ML: at 09:59

## 2024-03-21 RX ADMIN — ROPINIROLE HYDROCHLORIDE 1 MG: 1 TABLET, FILM COATED ORAL at 12:39

## 2024-03-21 RX ADMIN — ROPINIROLE HYDROCHLORIDE 2 MG: 1 TABLET, FILM COATED ORAL at 20:05

## 2024-03-21 RX ADMIN — FUROSEMIDE 60 MG: 40 TABLET ORAL at 09:59

## 2024-03-21 RX ADMIN — PREDNISONE 40 MG: 20 TABLET ORAL at 09:58

## 2024-03-21 RX ADMIN — ROPINIROLE HYDROCHLORIDE 1 MG: 1 TABLET, FILM COATED ORAL at 06:38

## 2024-03-21 RX ADMIN — ATORVASTATIN CALCIUM 40 MG: 40 TABLET, FILM COATED ORAL at 20:05

## 2024-03-21 RX ADMIN — METOPROLOL SUCCINATE 12.5 MG: 25 TABLET, EXTENDED RELEASE ORAL at 09:59

## 2024-03-21 RX ADMIN — ASPIRIN 81 MG: 81 TABLET, COATED ORAL at 09:59

## 2024-03-21 NOTE — PLAN OF CARE
Goal Outcome Evaluation:      RN was notified that patient had a pill box at bedside. Patient refused to let RN take pill box to pharmacy.  RN educated patient about the importance of not taking the pills from her pill box. Patient allowed RN to take pill box to pharmacy after education. No complaints at this time. Call light within reach.

## 2024-03-21 NOTE — SIGNIFICANT NOTE
03/21/24 1400   Physical Therapy Time and Intention   Session Not Performed patient unavailable for treatment   Comment, Session Not Performed Pt asleep

## 2024-03-21 NOTE — PROGRESS NOTES
[facility]   Hospitalist Progress Note  Date: 3/21/2024  Patient Name: Stefania Molina  : 1955  MRN: [id number]  Date of admission: 3/16/2024      Subjective   Subjective     Chief complaint: Shortness of breath    Summary:  68-year-old female with hepatitis C, CAD status post PCI, nonischemic cardiomyopathy, heart failure reduced ejection fraction, with documented intolerance to Entresto, previously refused to take aspirin and statin, left bundle branch block, diabetes mellitus, gout, hypertension, history of DCIS left breast, untreated, being followed as outpatient, history of CVA with left-sided weakness, hospitalized on 3/16/2024 for chief complaint of shortness of breath, with recent environmental exposures 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  Cardiology and nephrology consulted with underlying CHF, and worsening renal function with diuresis.  Limited echo showing worsening cardiac function EF 26 to 30%.    Interval follow-up: Seen and examined this morning, no acute distress, no acute major new events, breathing continues to gradually improve, creatinine slightly up today at 1.8, BUN 64, blood sugars adequately controlled, white blood cell count 11,000.  Patient had pill box next to her, nursing staff tried to retrieve pillbox, patient refused to provide it.  Telemetry reviewed, no acute major rhythmic events.  Blood pressure is stable.  1.1 L of urine output over the past 24 hours.  Remains weak and fatigued.   looking to help her with community resources for housing.  Once that settles in.  Will start planning disposition    Review of systems:  All systems reviewed  and negative except for weakness, fatigue, cough, continues to complain of shortness of breath with exertional activity    Objective   Objective     Vitals:   Temp:  [97.3 °F (36.3 °C)-98.2 °F (36.8 °C)] 97.5 °F (36.4 °C)  Heart Rate:  [72-86] 86  Resp:  [16-20] 20  BP: (110-141)/(61-91) 135/89  Physical Exam      Constitutional: Awake, alert, no acute distress, sitting upright   Eyes: Pupils equal, sclerae anicteric, no conjunctival injection   HENT: NCAT, mucous membranes moist   Neck: Supple, full range of motion   Respiratory: Diminished breath sounds no wheezing or rhonchi   Cardiovascular: RRR, no murmurs, rubs, or gallops, palpable pedal pulses bilaterally   Gastrointestinal: Positive bowel sounds, soft, nontender, nondistended   Musculoskeletal: Trace bilateral ankle edema, no clubbing or cyanosis to extremities   Psychiatric: Appropriate affect, cooperative   Neurologic: Oriented x 3, strength symmetric in all extremities, Cranial Nerves grossly intact to confrontation, speech clear   Skin: No rashes visible on exposed skin    Result Review    Result Review:  I have personally reviewed the pertinent results from the past 24 hours to 3/21/2024 18:58 EDT and agree with these findings:  [x]  Laboratory   CBC          3/19/2024    04:10 3/20/2024    04:49 3/21/2024    04:07   CBC   WBC 10.53  10.06  11.59    RBC 3.43  3.59  4.10    Hemoglobin 10.0  10.6  11.7    Hematocrit 31.8  32.4  36.9    MCV 92.7  90.3  90.0    MCH 29.2  29.5  28.5    MCHC 31.4  32.7  31.7    RDW 13.3  13.1  13.0    Platelets 202  219  255      BMP          3/19/2024    04:10 3/20/2024    04:49 3/21/2024    04:07   BMP   BUN 65  67  64    Creatinine 1.90  1.73  1.80    Sodium 135  136  137    Potassium 4.6  4.6  4.6    Chloride 100  98  99    CO2 22.6  24.4  23.5    Calcium 8.9  9.1  9.2      LIVER FUNCTION TESTS:      Lab 03/21/24  0407 03/20/24  0449 03/19/24  0410 03/18/24  0444 03/16/24  1041   TOTAL PROTEIN 6.9 6.8 6.7 6.9 6.9    ALBUMIN 3.8 3.8 3.7 3.8 3.8   GLOBULIN  --   --   --   --  3.1   ALT (SGPT) 18 18 17 13 13   AST (SGOT) 13 15 17 20 16   BILIRUBIN 0.3 0.3 0.2 0.2 0.5   BILIRUBIN DIRECT <0.2 <0.2 <0.2 <0.2  --    ALK PHOS 73 [phone] 89       [x]  Microbiology   Microbiology Results (last 10 days)       Procedure Component Value - Date/Time    Urine Culture - Urine, Urine, Clean Catch [[id number]]  (Normal) Collected: 03/18/24 1022    Lab Status: Final result Specimen: Urine, Clean Catch Updated: 03/19/24 0936     Urine Culture No growth    COVID PRE-OP / PRE-PROCEDURE SCREENING ORDER (NO ISOLATION) - Swab, Nasopharynx [[id number]]  (Normal) Collected: 03/16/24 1612    Lab Status: Final result Specimen: Swab from Nasopharynx Updated: 03/16/24 1658    Narrative:      The following orders were created for panel order COVID PRE-OP / PRE-PROCEDURE SCREENING ORDER (NO ISOLATION) - Swab, Nasopharynx.  Procedure                               Abnormality         Status                     ---------                               -----------         ------                     COVID-19, FLU A/B, RSV P...[[id number]]  Normal              Final result                 Please view results for these tests on the individual orders.    COVID-19, FLU A/B, RSV PCR 1 HR TAT - Swab, Nasopharynx [[id number]]  (Normal) Collected: 03/16/24 1612    Lab Status: Final result Specimen: Swab from Nasopharynx Updated: 03/16/24 1658     COVID19 Not Detected     Influenza A PCR Not Detected     Influenza B PCR Not Detected     RSV, PCR Not Detected    Narrative:      Fact sheet for providers: https://www.fda.gov/media/831627/download    Fact sheet for patients: https://www.fda.gov/media/248584/download    Test performed by PCR.              [x]  Radiology US Renal Bilateral    Result Date: 3/18/2024    1. Grossly unremarkable appearance of the kidneys bilaterally 2. Nonvisualization of the bladder      TAVARES REAL MD       Electronically Signed and Approved By:  TAVARES REAL MD on 3/18/2024 at 14:36             US Liver    Result Date: 3/18/2024    1. No gallstones or biliary obstruction.  The gallbladder is partially contracted and there is borderline thickening of the wall.  This wall thickening may be secondary to contraction rather than cholecystitis. 2. Small right renal cyst. 3. No liver mass.       ILIA RUIZ MD       Electronically Signed and Approved By: ILIA RUIZ MD on 3/18/2024 at 6:25             CT Chest With Contrast Diagnostic    Result Date: 3/16/2024    1. Allowing for the limitation of mild respiratory motion artifact, no pulmonary emboli are seen. 2. Moderate-sized bilateral pleural effusions with partial underlying bilateral lower lobe compressive atelectasis. 3. Mild scattered bilateral ground-glass opacities, which may be due to pulmonary edema or infection. 4. Abnormal appearance of the partially included left breast with skin thickening, mass, and calcifications.  Findings are concerning for malignancy or potentially post treatment changes.  There is no previous breast imaging for comparison.  Recommend correlation with the patient's history and physical exam.  If this is an unexpected finding, recommend further evaluation with outpatient bilateral diagnostic mammogram and breast ultrasound.     JINA VEGAS MD       Electronically Signed and Approved By: JINA VEGAS MD on 3/16/2024 at 12:56             XR Chest 1 View    Result Date: 3/16/2024    New airspace opacities in both lower lungs, concerning for pneumonia and/or pulmonary edema with pleural effusions.       JINA VEGAS MD       Electronically Signed and Approved By: JINA VEGAS MD on 3/16/2024 at 11:14                [x]  EKG/Telemetry   ECG 12 Lead ED Triage Standing Order; SOA   Final Result   HEART RATE= 85  bpm   RR Interval= 708  ms   MO Interval= 157  ms   P Horizontal Axis=   deg   P Front Axis= 61  deg   QRSD Interval= 129  ms   QT Interval= 405  ms    QTcB= 481  ms   QRS Axis= -23  deg   T Wave Axis= 30  deg   - ABNORMAL ECG -   Sinus rhythm   Probable left atrial enlargement   Left bundle branch block   When compared with ECG of 22-Feb-2024 20:40:31,   No significant change   Electronically Signed By: Jarod Yeh ([facility]) 16-Mar-2024 21:16:12   Date and Time of Study: 2024-03-16 10:39:58                [x]  Cardiology/Vascular   []  Pathology  [x]  Old records  []  Other:    Assessment & Plan   Assessment / Plan     Assessment/Plan:    Assessment:  Acute decompensation of chronic systolic heart failure  Left bundle branch block  CKD stage IIIa  Bilateral pleural effusions  NSTEMI due to decompensation of heart failure  Questionable asthma exacerbation due to environmental exposures  Left breast DCIS; untreated, needs follow-up  Hep C positive antibody  CAD with history of PCI  Nonischemic cardiomyopathy  Heart failure reduced ejection fraction  Previously intolerant to Entresto due to hypotension  Previously off aspirin and statin due to personal choices  Diabetes mellitus  Gout  Essential hypertension    Plan:  Labs and imaging reviewed  Noted patient taking pills from home pillbox, unknown what these pills are, patient refusing to provide to nursing staff for verification  Out of bed to chair  PT/OT  Continue Lasix 60 mg p.o. daily  Continue holding Jardiance  Nephrology following renal function  Cardiology consulted discussed with Dr. Cortez, recommendations appreciated; patient cannot tolerate Entresto; the best bet is keeping her on low-dose beta-blockers; will follow-up as outpatient  Continue Pulmicort nebs twice daily  Needs follow-up with the heart failure clinic she remains local in the short-term while she is in [place], plans to move to [place]  Continue aspirin 81 mg daily  Continue atorvastatin 40 mg nightly  Continue metoprolol XL 12.5 mg daily  Monitor on telemetry  Strict I's and O's  Daily weights  A.m. labs   involved for  safe disposition planning  Full code  DVT prophylaxis with heparin  Clinical course dictate further management  Discussed with nurse at bedside    DVT prophylaxis:  Medical DVT prophylaxis orders are present.        CODE STATUS:   Level Of Support Discussed With: Patient  Code Status (Patient has no pulse and is not breathing): CPR (Attempt to Resuscitate)  Medical Interventions (Patient has pulse or is breathing): Full Support    Electronically signed by Donald Clark MD, 03/21/24, 7:01 PM EDT.  Portions of this documentation were transcribed electronically from a voice recognition software.  I confirm all data accurately represents the service(s) I performed at today's visit.

## 2024-03-21 NOTE — PROGRESS NOTES
" LOS: 5 days   Patient Care Team:  Angie Orourke APRN as PCP - General (Internal Medicine & Pediatrics)    Chief Complaint: NARGIS    Subjective     History of Present Illness  Pt without any acute complaints.  Breathing improved now.  Voiding well.  She is a retired nurse.    Subjective:  Symptoms:  Improved.  No shortness of breath, chest pain, chest pressure or anxiety.    Diet:  No nausea or vomiting.    Pain:  She reports no pain.        History taken from: patient chart RN    Objective     Vital Sign Min/Max for last 24 hours  Temp  Min: 97.3 °F (36.3 °C)  Max: 98.2 °F (36.8 °C)   BP  Min: 111/53  Max: 141/65   Pulse  Min: 72  Max: 87   Resp  Min: 16  Max: 20   SpO2  Min: 97 %  Max: 100 %   No data recorded   Weight  Min: 88.2 kg (194 lb 7.1 oz)  Max: 88.2 kg (194 lb 7.1 oz)     Flowsheet Rows      Flowsheet Row First Filed Value   Admission Height 160 cm (63\") Documented at 03/16/2024 1014   Admission Weight 87.5 kg (192 lb 14.4 oz) Documented at 03/16/2024 1014            I/O this shift:  In: 240 [P.O.:240]  Out: -   I/O last 3 completed shifts:  In: 1200 [P.O.:1200]  Out: 2500 [Urine:2500]    Objective:  General Appearance:  Comfortable, in no acute distress and not in pain.    Vital signs: (most recent): Blood pressure 134/91, pulse 81, temperature 97.7 °F (36.5 °C), temperature source Oral, resp. rate 20, height 160 cm (63\"), weight 88.2 kg (194 lb 7.1 oz), SpO2 97%.  Vital signs are normal.  No fever.    Output: Producing urine.    HEENT: Normal HEENT exam.    Lungs:  Normal effort and normal respiratory rate.    Heart: Normal rate.  Regular rhythm.    Abdomen: Abdomen is soft.  Bowel sounds are normal.   There is no abdominal tenderness.     Extremities: Normal range of motion.  There is no dependent edema.    Pulses: Distal pulses are intact.    Neurological: Patient is alert and oriented to person, place and time.    Pupils:  Pupils are equal, round, and reactive to light.    Skin:  Warm and dry. " "               Results Review:     I reviewed the patient's new clinical results.  I reviewed the patient's new imaging results and agree with the interpretation.  I reviewed the patient's other test results and agree with the interpretation    WBC WBC   Date Value Ref Range Status   03/21/2024 11.59 (H) 3.40 - 10.80 10*3/mm3 Final   03/20/2024 10.06 3.40 - 10.80 10*3/mm3 Final   03/19/2024 10.53 3.40 - 10.80 10*3/mm3 Final      HGB Hemoglobin   Date Value Ref Range Status   03/21/2024 11.7 (L) 12.0 - 15.9 g/dL Final   03/20/2024 10.6 (L) 12.0 - 15.9 g/dL Final   03/19/2024 10.0 (L) 12.0 - 15.9 g/dL Final      HCT Hematocrit   Date Value Ref Range Status   03/21/2024 36.9 34.0 - 46.6 % Final   03/20/2024 32.4 (L) 34.0 - 46.6 % Final   03/19/2024 31.8 (L) 34.0 - 46.6 % Final      Platlets No results found for: \"LABPLAT\"   MCV MCV   Date Value Ref Range Status   03/21/2024 90.0 79.0 - 97.0 fL Final   03/20/2024 90.3 79.0 - 97.0 fL Final   03/19/2024 92.7 79.0 - 97.0 fL Final          Sodium Sodium   Date Value Ref Range Status   03/21/2024 137 136 - 145 mmol/L Final   03/20/2024 136 136 - 145 mmol/L Final   03/19/2024 135 (L) 136 - 145 mmol/L Final      Potassium Potassium   Date Value Ref Range Status   03/21/2024 4.6 3.5 - 5.2 mmol/L Final   03/20/2024 4.6 3.5 - 5.2 mmol/L Final   03/19/2024 4.6 3.5 - 5.2 mmol/L Final      Chloride Chloride   Date Value Ref Range Status   03/21/2024 99 98 - 107 mmol/L Final   03/20/2024 98 98 - 107 mmol/L Final   03/19/2024 100 98 - 107 mmol/L Final      CO2 CO2   Date Value Ref Range Status   03/21/2024 23.5 22.0 - 29.0 mmol/L Final   03/20/2024 24.4 22.0 - 29.0 mmol/L Final   03/19/2024 22.6 22.0 - 29.0 mmol/L Final      BUN BUN   Date Value Ref Range Status   03/21/2024 64 (H) 8 - 23 mg/dL Final   03/20/2024 67 (H) 8 - 23 mg/dL Final   03/19/2024 65 (H) 8 - 23 mg/dL Final      Creatinine Creatinine   Date Value Ref Range Status   03/21/2024 1.80 (H) 0.57 - 1.00 mg/dL Final " "  03/20/2024 1.73 (H) 0.57 - 1.00 mg/dL Final   03/19/2024 1.90 (H) 0.57 - 1.00 mg/dL Final      Calcium Calcium   Date Value Ref Range Status   03/21/2024 9.2 8.6 - 10.5 mg/dL Final   03/20/2024 9.1 8.6 - 10.5 mg/dL Final   03/19/2024 8.9 8.6 - 10.5 mg/dL Final      PO4 No results found for: \"CAPO4\"   Albumin Albumin   Date Value Ref Range Status   03/21/2024 3.8 3.5 - 5.2 g/dL Final   03/20/2024 3.8 3.5 - 5.2 g/dL Final   03/19/2024 3.7 3.5 - 5.2 g/dL Final      Magnesium Magnesium   Date Value Ref Range Status   03/21/2024 2.2 1.6 - 2.4 mg/dL Final   03/20/2024 2.1 1.6 - 2.4 mg/dL Final   03/19/2024 2.0 1.6 - 2.4 mg/dL Final      Uric Acid No results found for: \"URICACID\"       Medication Review:   !Patient Home Medications Stored in Pharmacy, , Does not apply, BID  allopurinol, 100 mg, Oral, Daily  aspirin, 81 mg, Oral, Daily  atorvastatin, 40 mg, Oral, Nightly  budesonide, 0.5 mg, Nebulization, BID - RT  [Held by provider] empagliflozin, 10 mg, Oral, Daily  furosemide, 60 mg, Oral, Daily  heparin (porcine), 5,000 Units, Subcutaneous, Q8H  insulin lispro, 2-7 Units, Subcutaneous, 4x Daily AC & at Bedtime  ipratropium-albuterol, 3 mL, Nebulization, 4x Daily - RT  metoprolol succinate XL, 12.5 mg, Oral, Q12H  predniSONE, 40 mg, Oral, Daily With Breakfast  rOPINIRole, 1 mg, Oral, Daily With Lunch  rOPINIRole, 1 mg, Oral, QAM  rOPINIRole, 2 mg, Oral, Nightly  sodium chloride, 10 mL, Intravenous, Q12H  traZODone, 100 mg, Oral, Nightly          Assessment & Plan       Acute decompensated heart failure      Assessment & Plan  - Acute kidney injury, etiology is uncertain possibly related to diuresis versus CT contrast on 3/16/2024.  Creatinine improved to 1.70 today.  UA benign without blood or protein.   Restart jardiance, aldactone on hold with borderline high potassium.  Continue lasix 60mg po qday.  OK for d/c from renal standpoint.  - Chronic kidney disease stage IIIb with baseline creatinine 1.7-1.9 from previous " records.  This is likely related to diabetic hypertensive nephropathy.  - Type 2 diabetes since 1996 with known diabetic retinopathy and neuropathy.  - Hypertension, blood pressure is acceptable.  - History of congestive heart failure, 2D echo showing reduced ejection fraction with LVH, being evaluated by cardiology.  - History of hepatitis C.  - Right renal cyst noted on imaging.  - Abnormal breast imaging, per primary.  - Gout-  on allopurinol.  Uric acid at goal.      Rayray Parra MD  03/21/24  09:48 EDT

## 2024-03-22 ENCOUNTER — READMISSION MANAGEMENT (OUTPATIENT)
Dept: CALL CENTER | Facility: HOSPITAL | Age: 69
End: 2024-03-22
Payer: MEDICARE

## 2024-03-22 VITALS
BODY MASS INDEX: 34.61 KG/M2 | DIASTOLIC BLOOD PRESSURE: 60 MMHG | HEART RATE: 79 BPM | WEIGHT: 195.33 LBS | OXYGEN SATURATION: 100 % | HEIGHT: 63 IN | RESPIRATION RATE: 16 BRPM | TEMPERATURE: 97.7 F | SYSTOLIC BLOOD PRESSURE: 119 MMHG

## 2024-03-22 PROBLEM — I50.23 ACUTE ON CHRONIC HFREF (HEART FAILURE WITH REDUCED EJECTION FRACTION): Status: ACTIVE | Noted: 2024-03-22

## 2024-03-22 LAB
ALBUMIN SERPL-MCNC: 3.5 G/DL (ref 3.5–5.2)
ALP SERPL-CCNC: 67 U/L (ref 39–117)
ALT SERPL W P-5'-P-CCNC: 19 U/L (ref 1–33)
ANION GAP SERPL CALCULATED.3IONS-SCNC: 12.9 MMOL/L (ref 5–15)
AST SERPL-CCNC: 14 U/L (ref 1–32)
BASOPHILS # BLD AUTO: 0.01 10*3/MM3 (ref 0–0.2)
BASOPHILS NFR BLD AUTO: 0.1 % (ref 0–1.5)
BILIRUB CONJ SERPL-MCNC: <0.2 MG/DL (ref 0–0.3)
BILIRUB INDIRECT SERPL-MCNC: NORMAL MG/DL
BILIRUB SERPL-MCNC: 0.2 MG/DL (ref 0–1.2)
BUN SERPL-MCNC: 73 MG/DL (ref 8–23)
BUN/CREAT SERPL: 37.1 (ref 7–25)
CALCIUM SPEC-SCNC: 8.6 MG/DL (ref 8.6–10.5)
CHLORIDE SERPL-SCNC: 97 MMOL/L (ref 98–107)
CO2 SERPL-SCNC: 23.1 MMOL/L (ref 22–29)
CREAT SERPL-MCNC: 1.97 MG/DL (ref 0.57–1)
DEPRECATED RDW RBC AUTO: 42.2 FL (ref 37–54)
EGFRCR SERPLBLD CKD-EPI 2021: 26.9 ML/MIN/1.73
EOSINOPHIL # BLD AUTO: 0.01 10*3/MM3 (ref 0–0.4)
EOSINOPHIL NFR BLD AUTO: 0.1 % (ref 0.3–6.2)
ERYTHROCYTE [DISTWIDTH] IN BLOOD BY AUTOMATED COUNT: 13 % (ref 12.3–15.4)
GLUCOSE BLDC GLUCOMTR-MCNC: 115 MG/DL (ref 70–99)
GLUCOSE BLDC GLUCOMTR-MCNC: 133 MG/DL (ref 70–99)
GLUCOSE SERPL-MCNC: 161 MG/DL (ref 65–99)
HCT VFR BLD AUTO: 35.2 % (ref 34–46.6)
HGB BLD-MCNC: 11.4 G/DL (ref 12–15.9)
IMM GRANULOCYTES # BLD AUTO: 0.15 10*3/MM3 (ref 0–0.05)
IMM GRANULOCYTES NFR BLD AUTO: 1.2 % (ref 0–0.5)
LYMPHOCYTES # BLD AUTO: 1.41 10*3/MM3 (ref 0.7–3.1)
LYMPHOCYTES NFR BLD AUTO: 11 % (ref 19.6–45.3)
MAGNESIUM SERPL-MCNC: 2.1 MG/DL (ref 1.6–2.4)
MCH RBC QN AUTO: 29.1 PG (ref 26.6–33)
MCHC RBC AUTO-ENTMCNC: 32.4 G/DL (ref 31.5–35.7)
MCV RBC AUTO: 89.8 FL (ref 79–97)
MONOCYTES # BLD AUTO: 0.66 10*3/MM3 (ref 0.1–0.9)
MONOCYTES NFR BLD AUTO: 5.2 % (ref 5–12)
NEUTROPHILS NFR BLD AUTO: 10.54 10*3/MM3 (ref 1.7–7)
NEUTROPHILS NFR BLD AUTO: 82.4 % (ref 42.7–76)
NRBC BLD AUTO-RTO: 0 /100 WBC (ref 0–0.2)
PHOSPHATE SERPL-MCNC: 3.7 MG/DL (ref 2.5–4.5)
PLATELET # BLD AUTO: 247 10*3/MM3 (ref 140–450)
PMV BLD AUTO: 10.2 FL (ref 6–12)
POTASSIUM SERPL-SCNC: 4.4 MMOL/L (ref 3.5–5.2)
PROT SERPL-MCNC: 6.2 G/DL (ref 6–8.5)
RBC # BLD AUTO: 3.92 10*6/MM3 (ref 3.77–5.28)
SODIUM SERPL-SCNC: 133 MMOL/L (ref 136–145)
WBC NRBC COR # BLD AUTO: 12.78 10*3/MM3 (ref 3.4–10.8)

## 2024-03-22 PROCEDURE — 94799 UNLISTED PULMONARY SVC/PX: CPT

## 2024-03-22 PROCEDURE — 25010000002 HEPARIN (PORCINE) PER 1000 UNITS: Performed by: STUDENT IN AN ORGANIZED HEALTH CARE EDUCATION/TRAINING PROGRAM

## 2024-03-22 PROCEDURE — 85025 COMPLETE CBC W/AUTO DIFF WBC: CPT | Performed by: FAMILY MEDICINE

## 2024-03-22 PROCEDURE — 80076 HEPATIC FUNCTION PANEL: CPT | Performed by: FAMILY MEDICINE

## 2024-03-22 PROCEDURE — 99239 HOSP IP/OBS DSCHRG MGMT >30: CPT | Performed by: FAMILY MEDICINE

## 2024-03-22 PROCEDURE — 82948 REAGENT STRIP/BLOOD GLUCOSE: CPT | Performed by: FAMILY MEDICINE

## 2024-03-22 PROCEDURE — 83735 ASSAY OF MAGNESIUM: CPT | Performed by: FAMILY MEDICINE

## 2024-03-22 PROCEDURE — 94761 N-INVAS EAR/PLS OXIMETRY MLT: CPT

## 2024-03-22 PROCEDURE — 94664 DEMO&/EVAL PT USE INHALER: CPT

## 2024-03-22 PROCEDURE — 80048 BASIC METABOLIC PNL TOTAL CA: CPT | Performed by: FAMILY MEDICINE

## 2024-03-22 PROCEDURE — 84100 ASSAY OF PHOSPHORUS: CPT | Performed by: FAMILY MEDICINE

## 2024-03-22 RX ORDER — FUROSEMIDE 20 MG/1
60 TABLET ORAL DAILY
Qty: 90 TABLET | Refills: 0 | Status: SHIPPED | OUTPATIENT
Start: 2024-03-23 | End: 2024-04-22

## 2024-03-22 RX ORDER — FLUTICASONE PROPIONATE AND SALMETEROL XINAFOATE 115; 21 UG/1; UG/1
2 AEROSOL, METERED RESPIRATORY (INHALATION)
Qty: 12 G | Refills: 0 | Status: SHIPPED | OUTPATIENT
Start: 2024-03-22 | End: 2024-03-22

## 2024-03-22 RX ORDER — ASPIRIN 81 MG/1
81 TABLET ORAL DAILY
Qty: 30 TABLET | Refills: 0 | Status: SHIPPED | OUTPATIENT
Start: 2024-03-23 | End: 2024-03-22

## 2024-03-22 RX ORDER — FUROSEMIDE 20 MG/1
60 TABLET ORAL DAILY
Qty: 90 TABLET | Refills: 0 | Status: SHIPPED | OUTPATIENT
Start: 2024-03-23 | End: 2024-03-22

## 2024-03-22 RX ORDER — ATORVASTATIN CALCIUM 40 MG/1
40 TABLET, FILM COATED ORAL NIGHTLY
Qty: 30 TABLET | Refills: 0 | Status: SHIPPED | OUTPATIENT
Start: 2024-03-22 | End: 2024-03-22

## 2024-03-22 RX ORDER — ATORVASTATIN CALCIUM 40 MG/1
40 TABLET, FILM COATED ORAL NIGHTLY
Qty: 30 TABLET | Refills: 0 | Status: SHIPPED | OUTPATIENT
Start: 2024-03-22 | End: 2024-04-21

## 2024-03-22 RX ORDER — ASPIRIN 81 MG/1
81 TABLET ORAL DAILY
Qty: 30 TABLET | Refills: 0 | Status: SHIPPED | OUTPATIENT
Start: 2024-03-23 | End: 2024-04-22

## 2024-03-22 RX ORDER — FLUTICASONE PROPIONATE AND SALMETEROL XINAFOATE 115; 21 UG/1; UG/1
2 AEROSOL, METERED RESPIRATORY (INHALATION)
Qty: 12 G | Refills: 0 | Status: SHIPPED | OUTPATIENT
Start: 2024-03-22

## 2024-03-22 RX ORDER — METOPROLOL SUCCINATE 25 MG/1
12.5 TABLET, EXTENDED RELEASE ORAL EVERY 12 HOURS SCHEDULED
Qty: 30 TABLET | Refills: 0 | Status: SHIPPED | OUTPATIENT
Start: 2024-03-22 | End: 2024-04-21

## 2024-03-22 RX ORDER — ALBUTEROL SULFATE 90 UG/1
2 AEROSOL, METERED RESPIRATORY (INHALATION) EVERY 4 HOURS PRN
Qty: 18 G | Refills: 0 | Status: SHIPPED | OUTPATIENT
Start: 2024-03-22 | End: 2024-03-22

## 2024-03-22 RX ORDER — ALBUTEROL SULFATE 90 UG/1
2 AEROSOL, METERED RESPIRATORY (INHALATION) EVERY 4 HOURS PRN
Qty: 18 G | Refills: 0 | Status: SHIPPED | OUTPATIENT
Start: 2024-03-22

## 2024-03-22 RX ORDER — METOPROLOL SUCCINATE 25 MG/1
12.5 TABLET, EXTENDED RELEASE ORAL EVERY 12 HOURS SCHEDULED
Qty: 30 TABLET | Refills: 0 | Status: SHIPPED | OUTPATIENT
Start: 2024-03-22 | End: 2024-03-22

## 2024-03-22 RX ADMIN — METOPROLOL SUCCINATE 12.5 MG: 25 TABLET, EXTENDED RELEASE ORAL at 08:41

## 2024-03-22 RX ADMIN — ROPINIROLE HYDROCHLORIDE 1 MG: 1 TABLET, FILM COATED ORAL at 06:29

## 2024-03-22 RX ADMIN — ROPINIROLE HYDROCHLORIDE 1 MG: 1 TABLET, FILM COATED ORAL at 13:06

## 2024-03-22 RX ADMIN — HEPARIN SODIUM 5000 UNITS: 5000 INJECTION INTRAVENOUS; SUBCUTANEOUS at 06:28

## 2024-03-22 RX ADMIN — FUROSEMIDE 60 MG: 40 TABLET ORAL at 08:40

## 2024-03-22 RX ADMIN — Medication 10 ML: at 08:41

## 2024-03-22 RX ADMIN — BUDESONIDE 0.5 MG: 0.5 INHALANT RESPIRATORY (INHALATION) at 07:48

## 2024-03-22 RX ADMIN — ALLOPURINOL 100 MG: 100 TABLET ORAL at 08:40

## 2024-03-22 RX ADMIN — ASPIRIN 81 MG: 81 TABLET, COATED ORAL at 08:40

## 2024-03-22 NOTE — OUTREACH NOTE
Prep Survey      Flowsheet Row Responses   [facility] [place], [facility] by [facility] patient discharged from? [facility]   Is LACE score < 7 ? No   Eligibility [facility] [place] [place] [facility]   Date of Admission 03/16/24   Date of Discharge 03/22/24   Discharge Disposition Home or Self Care   Discharge diagnosis Acute decompensated heart failure   Does the patient have one of the following disease processes/diagnoses(primary or secondary)? CHF   Prep survey completed? Yes            Chelsea BOYD - Registered Nurse

## 2024-03-22 NOTE — PLAN OF CARE
Goal Outcome Evaluation:   Patient discharging to sister's home. Cab voucher given to patient. Home medications returned to patient.

## 2024-03-22 NOTE — DISCHARGE SUMMARY
[facility]         HOSPITALIST  DISCHARGE SUMMARY    Patient Name: Stefania Molina  : 1955  MRN: [id number]    Date of Admission: 3/16/2024  Date of Discharge:  3/22/2024    Primary Care Physician: Angie Orourke APRN    Consults       Date and Time Order Name Status Description    3/18/2024  8:00 AM Inpatient Cardiology Consult      3/18/2024  7:02 AM Inpatient Nephrology Consult Completed     3/16/2024  1:17 PM Inpatient Hospitalist Consult              Active and Resolved Hospital Problems:  Acute decompensation of chronic systolic heart failure  Left bundle branch block  CKD stage IIIa  Bilateral pleural effusions  NSTEMI due to decompensation of heart failure  Questionable asthma exacerbation due to environmental exposures  Left breast DCIS; untreated, needs follow-up  Hep C positive antibody  CAD with history of PCI  Nonischemic cardiomyopathy  Heart failure reduced ejection fraction  Previously intolerant to Entresto due to hypotension  Previously off aspirin and statin due to personal choices  Diabetes mellitus  Gout  Essential hypertension    Active Hospital Problems    Diagnosis POA   • **Acute decompensated heart failure [I50.9] Yes   • Acute on chronic HFrEF (heart failure with reduced ejection fraction) [I50.23] Yes      Resolved Hospital Problems   No resolved problems to display.       Hospital Course     Hospital Course:  68-year-old female with hepatitis C, CAD status post PCI, nonischemic cardiomyopathy, heart failure reduced ejection fraction, with documented intolerance to Entresto, previously refused to take aspirin and statin, left bundle branch block, diabetes mellitus, gout, hypertension, history of DCIS left breast, untreated, being followed as outpatient, history of CVA with left-sided weakness, hospitalized on 3/16/2024 for chief complaint of shortness of breath, with recent environmental exposures while remodeling a trailer, with recent changes to her  diuretics, presenting symptoms with NSTEMI likely related to heart acute decompensation of chronic heart failure, placed on scheduled diuretics, echo pending, with superimposed questionable asthma exacerbation due to environmental exposures  Must have referral as outpatient to evaluate left breast DCIS.  Cardiology and nephrology consulted with underlying CHF, and worsening renal function with diuresis.  Limited echo showing worsening cardiac function EF 26 to 30%.  Switch to Jardiance.  Could not placed on Entresto given underlying renal dysfunction.  Breathing improved and oxygen was weaned as of 3/22/2024.  She was hemodynamically stable at time of discharge  Referred to the COPD clinic as well as cardiology as well as nephrology as outpatient.  High risk for readmission due to lack of resources.  Patient plans to move back to [place].  I stressed the importance of following up for her left breast DCIS diagnosis as well as her underlying CHF and untreated hepatitis C.        Day of Discharge     Vital Signs:  Temp:  [97.3 °F (36.3 °C)-98.1 °F (36.7 °C)] 97.7 °F (36.5 °C)  Heart Rate:  [72-84] 79  Resp:  [16-20] 16  BP: (114-137)/(53-92) 119/60  Review of systems:  All systems reviewed and negative except for weakness, fatigue, cough      Physical Exam                 Constitutional: Awake, alert, no acute distress, sitting upright   Eyes: Pupils equal, sclerae anicteric, no conjunctival injection   HENT: NCAT, mucous membranes moist   Neck: Supple, full range of motion   Respiratory: Diminished breath sounds no wheezing or rhonchi   Cardiovascular: RRR, no murmurs, rubs, or gallops, palpable pedal pulses bilaterally   Gastrointestinal: Positive bowel sounds, soft, nontender, nondistended   Musculoskeletal: Trace bilateral ankle edema, no clubbing or cyanosis to extremities   Psychiatric: Appropriate affect, cooperative   Neurologic: Oriented x 3, strength symmetric in all extremities, Cranial Nerves grossly  intact to confrontation, speech clear   Skin: No rashes visible on exposed skin         Discharge Details        Discharge Medications        New Medications        Instructions Start Date   Advair -21 MCG/ACT inhaler  Generic drug: fluticasone-salmeterol   2 puffs, Inhalation, 2 Times Daily - RT      albuterol sulfate  (90 Base) MCG/ACT inhaler  Commonly known as: PROVENTIL HFA;VENTOLIN HFA;PROAIR HFA   2 puffs, Inhalation, Every 4 Hours PRN      aspirin 81 MG EC tablet   81 mg, Oral, Daily   Start Date: March 23, 2024     atorvastatin 40 MG tablet  Commonly known as: LIPITOR   40 mg, Oral, Nightly      Jardiance 10 MG tablet tablet  Generic drug: empagliflozin  Replaces: Farxiga 10 MG tablet   10 mg, Oral, Daily   Start Date: March 23, 2024            Changes to Medications        Instructions Start Date   furosemide 20 MG tablet  Commonly known as: LASIX  What changed: how much to take   60 mg, Oral, Daily   Start Date: March 23, 2024     metoprolol succinate XL 25 MG 24 hr tablet  Commonly known as: TOPROL-XL  What changed:   medication strength  how much to take  when to take this   Take 1/2 tablet by mouth Every 12 (Twelve) Hours for 30 days.             Continue These Medications        Instructions Start Date   acyclovir 400 MG tablet  Commonly known as: ZOVIRAX   Take 1 tablet by mouth 3 (Three) Times a Day As Needed (only with outbreaks).      allopurinol 300 MG tablet  Commonly known as: ZYLOPRIM   300 mg, Oral, Daily      BMI Digital Smart Scale misc   1 each, Does not apply, Daily      Coenzyme Q10 200 MG capsule   200 mg, Oral, Daily      dicyclomine 10 MG capsule  Commonly known as: BENTYL   10 mg, Oral, 4 Times Daily Before Meals & Nightly PRN      Mirabegron ER 25 MG tablet sustained-release 24 hour 24 hr tablet  Commonly known as: MYRBETRIQ   25 mg, Oral, Daily      rOPINIRole 1 MG tablet  Commonly known as: REQUIP   1 mg, Oral, 3 Times Daily, Take 1 hour before bedtime.       rOPINIRole 1 MG tablet  Commonly known as: REQUIP   1 mg, Oral, Daily With Lunch, Take 1 hour before bedtime.      rOPINIRole 1 MG tablet  Commonly known as: REQUIP   2 mg, Oral, Nightly, Take 1 hour before bedtime.      traZODone 100 MG tablet  Commonly known as: DESYREL   100 mg, Oral, Nightly             Stop These Medications      Farxiga 10 MG tablet  Generic drug: dapagliflozin Propanediol  Replaced by: Jardiance 10 MG tablet tablet     spironolactone 25 MG tablet  Commonly known as: ALDACTONE              Allergies   Allergen Reactions   • Clopidogrel Other (See Comments)     Nosebleed   • Entresto [Sacubitril-Valsartan] Other (See Comments)     hypotension       Discharge Disposition:  Home or Self Care    Diet:  Hospital:  Diet Order   Procedures   • Diet: Cardiac, Diabetic, Fluid Restriction (240 mL/tray); Healthy Heart (2-3 Na+); Consistent Carbohydrate; 2000 mL/day; Fluid Consistency: Thin (IDDSI 0)       Discharge Activity: as tolerates      CODE STATUS:  Code Status and Medical Interventions:   Ordered at: 03/16/24 1441     Level Of Support Discussed With:    Patient     Code Status (Patient has no pulse and is not breathing):    CPR (Attempt to Resuscitate)     Medical Interventions (Patient has pulse or is breathing):    Full Support         Future Appointments   Date Time Provider Department Center   3/26/2024  9:00 AM Bethany Albrecht APRN [facility] [facility]   4/9/2024  2:45 PM Paco Cortez MD [facility] [place] [facility]   9/17/2024  1:15 PM Paco Cortez MD [facility] [facility]       Additional Instructions for the Follow-ups that You Need to Schedule       Discharge Follow-up with PCP   As directed       Currently Documented PCP:    Angie Orourke APRN    PCP Phone Number:    [phone]     Follow Up Details: 3 to 7 days                Pertinent  and/or Most Recent Results     PROCEDURES:   Adult Transthoracic Echo Complete w/ Color, Spectral and Contrast if necessary per  protocol    Result Date: 3/18/2024  •  Left ventricular ejection fraction appears to be 26 - 30%. •  Left ventricular wall thickness is consistent with mild concentric hypertrophy. •  Left ventricular diastolic function is consistent with (grade III w/high LAP) reversible restrictive pattern. •  The left atrial cavity is moderate to severely dilated. •  Moderate mitral valve regurgitation is present. •  Estimated right ventricular systolic pressure from tricuspid regurgitation is markedly elevated (>55 mmHg).     US Renal Bilateral    Result Date: 3/18/2024  PROCEDURE: US RENAL BILATERAL  COMPARISON: [facility], CT, CT CHEST W CONTRAST DIAGNOSTIC, 3/16/2024, 12:35.  INDICATIONS: NARGIS CKD  FINDINGS:  Right kidney measures:  10.0 x 4.3 x 4.5 cm  Left kidney measures:  8.9 x 5.1 x 3.9 cm  Kidneys demonstrate no hydronephrosis or cortical thinning.  Echogenicity is unremarkable.  No suspicious renal mass or calcification.  Small 8 mm cortical cyst noted on the right  Urinary bladder:  Bladder is obscured, nondistended         1. Grossly unremarkable appearance of the kidneys bilaterally 2. Nonvisualization of the bladder      TAVARES REAL MD       Electronically Signed and Approved By: TAVARES REAL MD on 3/18/2024 at 14:36             US Liver    Result Date: 3/18/2024  PROCEDURE: US LIVER  COMPARISON: [facility], CT, CT CHEST W CONTRAST DIAGNOSTIC, 3/16/2024, 12:35.  INDICATIONS: history of hep c  FINDINGS:  Visualized pancreas is normal.  Main portal vein is patent with a normal hepatopetal direction of flow.  Gallbladder is partially contracted, and there is borderline thickening of the wall at 4 mm.  No gallstones.  Common duct is normal in caliber at 5 mm internal diameter.  Right kidney measures 10.1 cm in length.  It is morphologically normal and nonobstructed.  There is a small right renal cyst measuring 8 mm in size.  No liver mass.  No free fluid is seen.        1. No  gallstones or biliary obstruction.  The gallbladder is partially contracted and there is borderline thickening of the wall.  This wall thickening may be secondary to contraction rather than cholecystitis. 2. Small right renal cyst. 3. No liver mass.       ILIA RUIZ MD       Electronically Signed and Approved By: ILIA RUIZ MD on 3/18/2024 at 6:25             CT Chest With Contrast Diagnostic    Result Date: 3/16/2024  PROCEDURE: CT CHEST W CONTRAST DIAGNOSTIC  COMPARISON:  [facility], CR, XR CHEST 1 VW, 3/16/2024, 10:50.  INDICATIONS: shortness of breath  TECHNIQUE: After obtaining the patient's consent, CT images were obtained with non-ionic intravenous contrast material.   PROTOCOL:   Pulmonary embolism imaging protocol performed    RADIATION:   DLP: 481.8mGy*cm   Automated exposure control was utilized to minimize radiation dose. CONTRAST: 70cc Isovue 370 I.V. LABS:   eGFR: >60ml/min/1.73m2  FINDINGS:  Exam is mildly limited by respiratory motion artifact, but allowing for this limitation, no pulmonary arterial filling defects are seen to suggest pulmonary emboli.  Main pulmonary artery is nondilated.  Heart size is mildly enlarged.  There are moderate-sized bilateral pleural effusions with partial bilateral lower lobe compressive atelectasis.  There are mild scattered bilateral ground-glass opacities.  There is skin thickening of the partially included left breast with a 4.6 cm mass noted in the left breast, likely retro areolar location.  There also surrounding calcifications.  Partially included solid organs of the upper abdomen appear unremarkable for the phase of contrast enhancement.  No acute osseous abnormality is identified.        1. Allowing for the limitation of mild respiratory motion artifact, no pulmonary emboli are seen. 2. Moderate-sized bilateral pleural effusions with partial underlying bilateral lower lobe compressive atelectasis. 3. Mild scattered bilateral ground-glass  opacities, which may be due to pulmonary edema or infection. 4. Abnormal appearance of the partially included left breast with skin thickening, mass, and calcifications.  Findings are concerning for malignancy or potentially post treatment changes.  There is no previous breast imaging for comparison.  Recommend correlation with the patient's history and physical exam.  If this is an unexpected finding, recommend further evaluation with outpatient bilateral diagnostic mammogram and breast ultrasound.     JINA VEGAS MD       Electronically Signed and Approved By: JINA VEGAS MD on 3/16/2024 at 12:56             XR Chest 1 View    Result Date: 3/16/2024  PROCEDURE: XR CHEST 1 VW  COMPARISON: [facility], CR, XR CHEST 1 VW, 2/23/2024, 0:30.  INDICATIONS: SOA Triage Protocol  FINDINGS:  There are new airspace opacities in the lower lungs with partial obscuration of each hemidiaphragm.  Pneumothorax is seen.  Cardiac silhouette remains enlarged.        New airspace opacities in both lower lungs, concerning for pneumonia and/or pulmonary edema with pleural effusions.       JINA VEGAS MD       Electronically Signed and Approved By: JINA VEGAS MD on 3/16/2024 at 11:14             XR Chest 1 View    Result Date: 2/23/2024  PROCEDURE: XR CHEST 1 VW  COMPARISON: None  INDICATIONS: cough  FINDINGS:  Heart is enlarged.  Pulmonary vascularity is normal.  There is some linear scarring or atelectasis at the left lung base.  Lungs are otherwise clear.  No pneumothorax.       Cardiomegaly with linear scarring or atelectasis at the left base.  No evidence of pneumonia.       ILIA RUIZ MD       Electronically Signed and Approved By: ILIA RUIZ MD on 2/23/2024 at 0:50                LAB RESULTS:      Lab 03/22/24  0412 03/21/24  0407 03/20/24  0449 03/19/24  0410 03/18/24  0444   WBC 12.78* 11.59* 10.06 10.53 12.63*   HEMOGLOBIN 11.4* 11.7* 10.6* 10.0* 10.0*   HEMATOCRIT 35.2 36.9 32.4* 31.8*  30.8*   PLATELETS 247 255 219 202 201   NEUTROS ABS 10.54* 8.77* 8.29* 8.98* 11.20*   IMMATURE GRANS (ABS) 0.15* 0.09* 0.06* 0.05 0.06*   LYMPHS ABS 1.41 1.92 1.24 1.05 0.83   MONOS ABS 0.66 0.78 0.47 0.44 0.53   EOS ABS 0.01 0.01 0.00 0.00 0.00   MCV 89.8 90.0 90.3 92.7 91.1         Lab 03/22/24 0412 03/21/24 0407 03/20/24 0449 03/19/24 0410 03/18/24  0444   SODIUM 133* 137 136 135* 137   POTASSIUM 4.4 4.6 4.6 4.6 4.9   CHLORIDE 97* 99 98 100 100   CO2 23.1 23.5 24.4 22.6 23.2   ANION GAP 12.9 14.5 13.6 12.4 13.8   BUN 73* 64* 67* 65* 62*   CREATININE 1.97* 1.80* 1.73* 1.90* 2.28*   EGFR 26.9* 30.0* 31.5* 28.1* 22.6*   GLUCOSE 161* 119* 148* 168* 144*   CALCIUM 8.6 9.2 9.1 8.9 9.2   MAGNESIUM 2.1 2.2 2.1 2.0 2.1   PHOSPHORUS 3.7 4.0 3.8 3.9 3.6         Lab 03/22/24 0412 03/21/24 0407 03/20/24 0449 03/19/24  0410 03/18/24  0444 03/16/24  1041   TOTAL PROTEIN 6.2 6.9 6.8 6.7 6.9 6.9   ALBUMIN 3.5 3.8 3.8 3.7 3.8 3.8   GLOBULIN  --   --   --   --   --  3.1   ALT (SGPT) 19 18 18 17 13 13   AST (SGOT) 14 13 15 17 20 16   BILIRUBIN 0.2 0.3 0.3 0.2 0.2 0.5   BILIRUBIN DIRECT <0.2 <0.2 <0.2 <0.2 <0.2  --    ALK PHOS 67 73 73 73 77 89         Lab 03/16/24  1312 03/16/24  1041   PROBNP  --  16,037.0*   HSTROP T 92* 106*         Lab 03/16/24  1312   CHOLESTEROL 152   LDL CHOL 92   HDL CHOL 41   TRIGLYCERIDES 104             Brief Urine Lab Results  (Last result in the past 365 days)        Color   Clarity   Blood   Leuk Est   Nitrite   Protein   CREAT   Urine HCG        03/21/24 1012             55.8               Microbiology Results (last 10 days)       Procedure Component Value - Date/Time    Eosinophil Smear - Urine, Urine, Clean Catch [[id number]]  (Normal) Collected: 03/21/24 1012    Lab Status: Final result Specimen: Urine, Clean Catch Updated: 03/21/24 1935     Eosinophil Smear 0 % EOS/100 Cells     Urine Culture - Urine, Urine, Clean Catch [[id number]]  (Normal) Collected: 03/18/24 1022    Lab Status: Final  result Specimen: Urine, Clean Catch Updated: 03/19/24 0936     Urine Culture No growth    COVID PRE-OP / PRE-PROCEDURE SCREENING ORDER (NO ISOLATION) - Swab, Nasopharynx [[id number]]  (Normal) Collected: 03/16/24 1612    Lab Status: Final result Specimen: Swab from Nasopharynx Updated: 03/16/24 1658    Narrative:      The following orders were created for panel order COVID PRE-OP / PRE-PROCEDURE SCREENING ORDER (NO ISOLATION) - Swab, Nasopharynx.  Procedure                               Abnormality         Status                     ---------                               -----------         ------                     COVID-19, FLU A/B, RSV P...[[id number]]  Normal              Final result                 Please view results for these tests on the individual orders.    COVID-19, FLU A/B, RSV PCR 1 HR TAT - Swab, Nasopharynx [[id number]]  (Normal) Collected: 03/16/24 1612    Lab Status: Final result Specimen: Swab from Nasopharynx Updated: 03/16/24 1658     COVID19 Not Detected     Influenza A PCR Not Detected     Influenza B PCR Not Detected     RSV, PCR Not Detected    Narrative:      Fact sheet for providers: https://www.fda.gov/media/710412/download    Fact sheet for patients: https://www.fda.gov/media/835477/download    Test performed by PCR.            US Renal Bilateral    Result Date: 3/18/2024  Impression:   1. Grossly unremarkable appearance of the kidneys bilaterally 2. Nonvisualization of the bladder      TAVARES REAL MD       Electronically Signed and Approved By: TAVARES REAL MD on 3/18/2024 at 14:36             US Liver    Result Date: 3/18/2024  Impression:   1. No gallstones or biliary obstruction.  The gallbladder is partially contracted and there is borderline thickening of the wall.  This wall thickening may be secondary to contraction rather than cholecystitis. 2. Small right renal cyst. 3. No liver mass.       ILIA RUIZ MD       Electronically Signed and Approved By: ILIA RUIZ MD  on 3/18/2024 at 6:25             CT Chest With Contrast Diagnostic    Result Date: 3/16/2024  Impression:   1. Allowing for the limitation of mild respiratory motion artifact, no pulmonary emboli are seen. 2. Moderate-sized bilateral pleural effusions with partial underlying bilateral lower lobe compressive atelectasis. 3. Mild scattered bilateral ground-glass opacities, which may be due to pulmonary edema or infection. 4. Abnormal appearance of the partially included left breast with skin thickening, mass, and calcifications.  Findings are concerning for malignancy or potentially post treatment changes.  There is no previous breast imaging for comparison.  Recommend correlation with the patient's history and physical exam.  If this is an unexpected finding, recommend further evaluation with outpatient bilateral diagnostic mammogram and breast ultrasound.     JINA VEGAS MD       Electronically Signed and Approved By: JINA VEGAS MD on 3/16/2024 at 12:56             XR Chest 1 View    Result Date: 3/16/2024  Impression:   New airspace opacities in both lower lungs, concerning for pneumonia and/or pulmonary edema with pleural effusions.       JINA VEGAS MD       Electronically Signed and Approved By: JINA VEGAS MD on 3/16/2024 at 11:14             XR Chest 1 View    Result Date: 2/23/2024  Impression:  Cardiomegaly with linear scarring or atelectasis at the left base.  No evidence of pneumonia.       ILIA RUIZ MD       Electronically Signed and Approved By: ILIA RUIZ MD on 2/23/2024 at 0:50                      Results for orders placed during the hospital encounter of 03/16/24    Adult Transthoracic Echo Complete w/ Color, Spectral and Contrast if necessary per protocol    Interpretation Summary  •  Left ventricular ejection fraction appears to be 26 - 30%.  •  Left ventricular wall thickness is consistent with mild concentric hypertrophy.  •  Left ventricular diastolic function is  consistent with (grade III w/high LAP) reversible restrictive pattern.  •  The left atrial cavity is moderate to severely dilated.  •  Moderate mitral valve regurgitation is present.  •  Estimated right ventricular systolic pressure from tricuspid regurgitation is markedly elevated (>55 mmHg).      Labs Pending at Discharge:        Time spent on Discharge including face to face service:  35 minutes    Electronically signed by Donald Clark MD, 03/22/24, 4:33 PM EDT.    Portions of this documentation were transcribed electronically from a voice recognition software.  I confirm all data accurately represents the service(s) I performed at today's visit.

## 2024-03-25 ENCOUNTER — HOSPITAL ENCOUNTER (EMERGENCY)
Facility: HOSPITAL | Age: 69
Discharge: HOME | End: 2024-03-26
Attending: EMERGENCY MEDICINE | Admitting: EMERGENCY MEDICINE
Payer: MEDICARE

## 2024-03-25 ENCOUNTER — TRANSITIONAL CARE MANAGEMENT TELEPHONE ENCOUNTER (OUTPATIENT)
Dept: CALL CENTER | Facility: HOSPITAL | Age: 69
End: 2024-03-25
Payer: MEDICARE

## 2024-03-25 ENCOUNTER — APPOINTMENT (OUTPATIENT)
Dept: CT IMAGING | Facility: HOSPITAL | Age: 69
End: 2024-03-25
Payer: MEDICARE

## 2024-03-25 DIAGNOSIS — N39.0 URINARY TRACT INFECTION WITHOUT HEMATURIA, SITE UNSPECIFIED: Primary | ICD-10-CM

## 2024-03-25 DIAGNOSIS — S30.1XXA CONTUSION OF FLANK, INITIAL ENCOUNTER: ICD-10-CM

## 2024-03-25 LAB
BACTERIA UR QL AUTO: ABNORMAL /HPF
BASOPHILS # BLD AUTO: 0.02 10*3/MM3 (ref 0–0.2)
BASOPHILS NFR BLD AUTO: 0.1 % (ref 0–1.5)
BILIRUB UR QL STRIP: NEGATIVE
CLARITY UR: CLEAR
COLOR UR: YELLOW
DEPRECATED RDW RBC AUTO: 43.8 FL (ref 37–54)
EOSINOPHIL # BLD AUTO: 0.19 10*3/MM3 (ref 0–0.4)
EOSINOPHIL NFR BLD AUTO: 1.4 % (ref 0.3–6.2)
ERYTHROCYTE [DISTWIDTH] IN BLOOD BY AUTOMATED COUNT: 13.3 % (ref 12.3–15.4)
GLUCOSE UR STRIP-MCNC: ABNORMAL MG/DL
HCT VFR BLD AUTO: 35.8 % (ref 34–46.6)
HGB BLD-MCNC: 11.7 G/DL (ref 12–15.9)
HGB UR QL STRIP.AUTO: NEGATIVE
HYALINE CASTS UR QL AUTO: ABNORMAL /LPF
IMM GRANULOCYTES # BLD AUTO: 0.14 10*3/MM3 (ref 0–0.05)
IMM GRANULOCYTES NFR BLD AUTO: 1 % (ref 0–0.5)
KETONES UR QL STRIP: NEGATIVE
LEUKOCYTE ESTERASE UR QL STRIP.AUTO: ABNORMAL
LYMPHOCYTES # BLD AUTO: 2.03 10*3/MM3 (ref 0.7–3.1)
LYMPHOCYTES NFR BLD AUTO: 14.6 % (ref 19.6–45.3)
MCH RBC QN AUTO: 29.8 PG (ref 26.6–33)
MCHC RBC AUTO-ENTMCNC: 32.7 G/DL (ref 31.5–35.7)
MCV RBC AUTO: 91.1 FL (ref 79–97)
MONOCYTES # BLD AUTO: 0.86 10*3/MM3 (ref 0.1–0.9)
MONOCYTES NFR BLD AUTO: 6.2 % (ref 5–12)
NEUTROPHILS NFR BLD AUTO: 10.69 10*3/MM3 (ref 1.7–7)
NEUTROPHILS NFR BLD AUTO: 76.7 % (ref 42.7–76)
NITRITE UR QL STRIP: NEGATIVE
NRBC BLD AUTO-RTO: 0 /100 WBC (ref 0–0.2)
PH UR STRIP.AUTO: 6 [PH] (ref 5–8)
PLATELET # BLD AUTO: 211 10*3/MM3 (ref 140–450)
PMV BLD AUTO: 10.2 FL (ref 6–12)
PROT UR QL STRIP: NEGATIVE
RBC # BLD AUTO: 3.93 10*6/MM3 (ref 3.77–5.28)
RBC # UR STRIP: ABNORMAL /HPF
REF LAB TEST METHOD: ABNORMAL
SP GR UR STRIP: 1.01 (ref 1–1.03)
SQUAMOUS #/AREA URNS HPF: ABNORMAL /HPF
UROBILINOGEN UR QL STRIP: ABNORMAL
WBC # UR STRIP: ABNORMAL /HPF
WBC NRBC COR # BLD AUTO: 13.93 10*3/MM3 (ref 3.4–10.8)

## 2024-03-25 PROCEDURE — 96375 TX/PRO/DX INJ NEW DRUG ADDON: CPT

## 2024-03-25 PROCEDURE — 25010000002 MORPHINE PER 10 MG: Performed by: EMERGENCY MEDICINE

## 2024-03-25 PROCEDURE — 74176 CT ABD & PELVIS W/O CONTRAST: CPT

## 2024-03-25 PROCEDURE — 80053 COMPREHEN METABOLIC PANEL: CPT | Performed by: NURSE PRACTITIONER

## 2024-03-25 PROCEDURE — 85025 COMPLETE CBC W/AUTO DIFF WBC: CPT | Performed by: NURSE PRACTITIONER

## 2024-03-25 PROCEDURE — 87086 URINE CULTURE/COLONY COUNT: CPT | Performed by: NURSE PRACTITIONER

## 2024-03-25 PROCEDURE — 25010000002 ONDANSETRON PER 1 MG: Performed by: EMERGENCY MEDICINE

## 2024-03-25 PROCEDURE — 81001 URINALYSIS AUTO W/SCOPE: CPT | Performed by: NURSE PRACTITIONER

## 2024-03-25 PROCEDURE — 99284 EMERGENCY DEPT VISIT MOD MDM: CPT

## 2024-03-25 RX ORDER — ONDANSETRON 2 MG/ML
4 INJECTION INTRAMUSCULAR; INTRAVENOUS ONCE
Status: COMPLETED | OUTPATIENT
Start: 2024-03-25 | End: 2024-03-25

## 2024-03-25 RX ORDER — MORPHINE SULFATE 2 MG/ML
2 INJECTION, SOLUTION INTRAMUSCULAR; INTRAVENOUS ONCE
Status: COMPLETED | OUTPATIENT
Start: 2024-03-25 | End: 2024-03-25

## 2024-03-25 RX ADMIN — ONDANSETRON 4 MG: 2 INJECTION INTRAMUSCULAR; INTRAVENOUS at 23:42

## 2024-03-25 RX ADMIN — MORPHINE SULFATE 2 MG: 2 INJECTION, SOLUTION INTRAMUSCULAR; INTRAVENOUS at 23:42

## 2024-03-25 NOTE — OUTREACH NOTE
Call Center TCM Note      Flowsheet Row Responses   [facility] patient discharged from? [facility]   Does the patient have one of the following disease processes/diagnoses(primary or secondary)? CHF   TCM attempt successful? Yes   Call start time 1537   Call end time 1540   Discharge diagnosis Acute decompensated heart failure   Meds reviewed with patient/caregiver? Yes   Is the patient having any side effects they believe may be caused by any medication additions or changes? No   Does the patient have all medications ordered at discharge? Yes   Is the patient taking all medications as directed (includes completed medication regime)? Yes   Does the patient have an appointment with their PCP within 7-14 days of discharge? No   Nursing Interventions Patient declined scheduling/rescheduling appointment at this time, Routed TCM call to PCP office   Psychosocial issues? No   Did the patient receive a copy of their discharge instructions? Yes   What is the patient's perception of their health status since discharge? Improving   TCM call completed? Yes   Wrap up additional comments Pt reports she has known all her life about getting wts and s/s chf. Pt declined any appts as she states she will be moving out of state and will see her Drs in that state.   Call end time 1540             Leanne Steinberg RN    3/25/2024, 15:41 EDT

## 2024-03-25 NOTE — OUTREACH NOTE
Call Center TCM Note      Flowsheet Row Responses   [facility] [place] facility patient discharged from? [facility]   Does the patient have one of the following disease processes/diagnoses(primary or secondary)? CHF   TCM attempt successful? No   Unsuccessful attempts Attempt 1             Leanne Steinberg RN    3/25/2024, 08:29 EDT

## 2024-03-26 VITALS
HEART RATE: 80 BPM | RESPIRATION RATE: 20 BRPM | BODY MASS INDEX: 34.38 KG/M2 | OXYGEN SATURATION: 95 % | DIASTOLIC BLOOD PRESSURE: 56 MMHG | WEIGHT: 194 LBS | HEIGHT: 63 IN | TEMPERATURE: 98.2 F | SYSTOLIC BLOOD PRESSURE: 128 MMHG

## 2024-03-26 LAB
ALBUMIN SERPL-MCNC: 3.7 G/DL (ref 3.5–5.2)
ALBUMIN/GLOB SERPL: 1.2 G/DL
ALP SERPL-CCNC: 70 U/L (ref 39–117)
ALT SERPL W P-5'-P-CCNC: 16 U/L (ref 1–33)
ANION GAP SERPL CALCULATED.3IONS-SCNC: 12.6 MMOL/L (ref 5–15)
AST SERPL-CCNC: 19 U/L (ref 1–32)
BILIRUB SERPL-MCNC: 0.3 MG/DL (ref 0–1.2)
BUN SERPL-MCNC: 56 MG/DL (ref 8–23)
BUN/CREAT SERPL: 33.9 (ref 7–25)
CALCIUM SPEC-SCNC: 9.1 MG/DL (ref 8.6–10.5)
CHLORIDE SERPL-SCNC: 99 MMOL/L (ref 98–107)
CO2 SERPL-SCNC: 25.4 MMOL/L (ref 22–29)
CREAT SERPL-MCNC: 1.65 MG/DL (ref 0.57–1)
D-LACTATE SERPL-SCNC: 0.6 MMOL/L (ref 0.5–2)
EGFRCR SERPLBLD CKD-EPI 2021: 33.3 ML/MIN/1.73
GLOBULIN UR ELPH-MCNC: 3.1 GM/DL
GLUCOSE SERPL-MCNC: 175 MG/DL (ref 65–99)
POTASSIUM SERPL-SCNC: 5 MMOL/L (ref 3.5–5.2)
PROT SERPL-MCNC: 6.8 G/DL (ref 6–8.5)
SODIUM SERPL-SCNC: 137 MMOL/L (ref 136–145)

## 2024-03-26 PROCEDURE — 36415 COLL VENOUS BLD VENIPUNCTURE: CPT

## 2024-03-26 PROCEDURE — 83605 ASSAY OF LACTIC ACID: CPT | Performed by: NURSE PRACTITIONER

## 2024-03-26 PROCEDURE — 87040 BLOOD CULTURE FOR BACTERIA: CPT | Performed by: NURSE PRACTITIONER

## 2024-03-26 PROCEDURE — 96365 THER/PROPH/DIAG IV INF INIT: CPT

## 2024-03-26 PROCEDURE — 25010000002 CEFTRIAXONE PER 250 MG: Performed by: NURSE PRACTITIONER

## 2024-03-26 RX ORDER — CEFDINIR 300 MG/1
300 CAPSULE ORAL 2 TIMES DAILY
Qty: 14 CAPSULE | Refills: 0 | Status: SHIPPED | OUTPATIENT
Start: 2024-03-26 | End: 2024-04-02

## 2024-03-26 RX ORDER — METHOCARBAMOL 500 MG/1
500 TABLET, FILM COATED ORAL 3 TIMES DAILY PRN
Qty: 15 TABLET | Refills: 0 | Status: SHIPPED | OUTPATIENT
Start: 2024-03-26

## 2024-03-26 RX ADMIN — CEFTRIAXONE SODIUM 2000 MG: 2 INJECTION, POWDER, FOR SOLUTION INTRAMUSCULAR; INTRAVENOUS at 02:43

## 2024-03-26 NOTE — ED NOTES
PT ASSISTED TO RESTROOM.     CALLED PT SISTER AND THEY ARE STRANDED IN [place] RIGHT NOW AND PT HAS NO OTHER WAY HOME. PT SENT TO WAITING AREA AND SISTER WILL UPDATE HER WHEN SHE CAN FIND HER A RIDE.

## 2024-03-26 NOTE — SIGNIFICANT NOTE
03/26/24 1307   Transportation Services   [employer]      provided cab voucher for patient due to exceeding all options for transportation. Pt to be transported home via cab through [employer]

## 2024-03-26 NOTE — DISCHARGE INSTRUCTIONS
CT scan was negative and did not show any acute abnormality.    Your urine does show signs of infection.    Take medication as prescribed for treatment of back pain and UTI.    Follow-up with PCP.    Alternate ice and moist heat to affected area.    Drink plenty of fluids

## 2024-03-26 NOTE — ED PROVIDER NOTES
Time: 11:12 PM EDT  Date of encounter:  3/25/2024  Independent Historian/Clinical History and Information was obtained by:   Patient and EMS    History is limited by: N/A    Chief Complaint: Fall      History of Present Illness:  Patient is a 68 y.o. year old female who presents to the emergency department for evaluation of back pain after fall.  Patient states she was walking down the stairs and she only had her socks on when she slipped and fell hitting her left side back on the stairs.  Pain since then rates it a 8 out of 10.  Burning, aching and dull and then sharp with movement.  Patient denies hitting her head.  No neck or chest pain.  No abdominal pain.  No numbness tingling and weakness.  Patient denies she is on blood thinners.    HPI    Patient Care Team  Primary Care Provider: Angie Orourke APRN    Past Medical History:     Allergies   Allergen Reactions    Clopidogrel Other (See Comments)     Nosebleed    Entresto [Sacubitril-Valsartan] Other (See Comments)     hypotension     Past Medical History:   Diagnosis Date    CAD 10/18/2023    CHF (congestive heart failure)     Diabetes mellitus     Fluid retention     Gout     Hypertension     Stroke     Tachycardia      Past Surgical History:   Procedure Laterality Date    TONSILLECTOMY AND ADENOIDECTOMY       History reviewed. No pertinent family history.    Home Medications:  Prior to Admission medications    Medication Sig Start Date End Date Taking? Authorizing Provider   acyclovir (ZOVIRAX) 400 MG tablet Take 1 tablet by mouth 3 (Three) Times a Day As Needed (only with outbreaks).    Provider, MD Isamar   albuterol sulfate  (90 Base) MCG/ACT inhaler Inhale 2 puffs Every 4 (Four) Hours As Needed for Wheezing. 3/22/24   Donald Clark MD   allopurinol (ZYLOPRIM) 300 MG tablet Take 1 tablet by mouth Daily. 12/15/23   Angie Orourke APRN   aspirin 81 MG EC tablet Take 1 tablet by mouth Daily for 30 days. 3/23/24 4/22/24   Donald Clark MD   atorvastatin (LIPITOR) 40 MG tablet Take 1 tablet by mouth Every Night for 30 days. 3/22/24 4/21/24  Donald Clark MD   Coenzyme Q10 200 MG capsule Take 200 mg by mouth Daily.    ProviderIsamar MD   dicyclomine (BENTYL) 10 MG capsule Take 1 capsule by mouth 4 (Four) Times a Day Before Meals & at Bedtime As Needed for Abdominal Cramping.  Patient taking differently: Take 1 capsule by mouth 4 (Four) Times a Day Before Meals & at Bedtime As Needed for Abdominal Cramping. Patient takes this BID 11/1/23   Parker Fontana MD   empagliflozin (JARDIANCE) 10 MG tablet tablet Take 1 tablet by mouth Daily. 3/23/24   Donald Clark MD   fluticasone-salmeterol (Advair HFA) 115-21 MCG/ACT inhaler Inhale 2 puffs 2 (Two) Times a Day. 3/22/24   Donald Clark MD   furosemide (LASIX) 20 MG tablet Take 3 tablets by mouth Daily for 30 days. 3/23/24 4/22/24  Donald Clark MD   metoprolol succinate XL (TOPROL-XL) 25 MG 24 hr tablet Take 1/2 tablet by mouth Every 12 (Twelve) Hours for 30 days. 3/22/24 4/21/24  Donald Clark MD   Mirabegron ER (MYRBETRIQ) 25 MG tablet sustained-release 24 hour 24 hr tablet Take 1 tablet by mouth Daily. 12/15/23   Angie Orourke APRN   Misc. Devices (BMI Digital Smart Scale) misc Use 1 each Daily. 3/13/24   Latrice Allen APRN   rOPINIRole (REQUIP) 1 MG tablet Take 1 tablet by mouth 3 (Three) Times a Day. Take 1 hour before bedtime.  Patient taking differently: Take 1 tablet by mouth Every Morning. Take 1 hour before bedtime. 11/1/23   Parker Fontana MD   rOPINIRole (REQUIP) 1 MG tablet Take 1 tablet by mouth Daily With Lunch. Take 1 hour before bedtime.    ProviderIsamar MD   rOPINIRole (REQUIP) 1 MG tablet Take 2 tablets by mouth Every Night. Take 1 hour before bedtime.    Provider, MD Isamar   traZODone (DESYREL) 100 MG tablet Take 1 tablet by mouth Every Night. 12/15/23   Angie Orourke APRN        Social  "History:   Social History     Tobacco Use    Smoking status: Former     Current packs/day: 0.00     Types: Cigarettes     Quit date:      Years since quittin.2    Smokeless tobacco: Never   Vaping Use    Vaping status: Some Days    Substances: Flavoring   Substance Use Topics    Alcohol use: Not Currently    Drug use: Never         Review of Systems:  Review of Systems   Constitutional:  Negative for chills and fever.   HENT:  Negative for congestion, ear pain and sore throat.    Eyes:  Negative for pain.   Respiratory:  Negative for cough, chest tightness and shortness of breath.    Cardiovascular:  Negative for chest pain.   Gastrointestinal:  Negative for abdominal pain, diarrhea, nausea and vomiting.   Genitourinary:  Positive for flank pain. Negative for dysuria and hematuria.   Musculoskeletal:  Negative for joint swelling.   Skin:  Negative for pallor.   Neurological:  Negative for seizures, weakness, numbness and headaches.   Hematological: Negative.    Psychiatric/Behavioral: Negative.     All other systems reviewed and are negative.       Physical Exam:  /56 (BP Location: Right arm, Patient Position: Lying)   Pulse 80   Temp 98.2 °F (36.8 °C) (Oral)   Resp 20   Ht 160 cm (63\")   Wt 88 kg (194 lb 0.1 oz)   SpO2 95%   BMI 34.37 kg/m²     Physical Exam  Vitals and nursing note reviewed.   Constitutional:       General: She is not in acute distress.     Appearance: Normal appearance. She is not toxic-appearing.   HENT:      Head: Normocephalic and atraumatic.      Mouth/Throat:      Mouth: Mucous membranes are moist.   Eyes:      General: No scleral icterus.     Extraocular Movements: Extraocular movements intact.      Conjunctiva/sclera: Conjunctivae normal.      Pupils: Pupils are equal, round, and reactive to light.   Cardiovascular:      Rate and Rhythm: Normal rate and regular rhythm.      Pulses: Normal pulses.      Heart sounds: Normal heart sounds.   Pulmonary:      Effort: " Pulmonary effort is normal. No respiratory distress.      Breath sounds: Normal breath sounds.   Abdominal:      General: Bowel sounds are normal.      Palpations: Abdomen is soft.      Tenderness: There is no abdominal tenderness. There is left CVA tenderness. There is no right CVA tenderness.   Musculoskeletal:         General: Normal range of motion.      Cervical back: Normal range of motion and neck supple. No tenderness.   Skin:     General: Skin is warm and dry.      Comments: No bruising abrasions or lacerations   Neurological:      Mental Status: She is alert and oriented to person, place, and time. Mental status is at baseline.      Cranial Nerves: No cranial nerve deficit.      Sensory: No sensory deficit.      Motor: No weakness.   Psychiatric:         Mood and Affect: Mood normal.         Behavior: Behavior normal.              Medical Decision Making:      Comorbidities that affect care:    Congestive Heart Failure, Coronary Artery Disease, Diabetes, Hypertension    External Notes reviewed:    Previous Admission Note: Patient was recently admitted for CHF exacerbation from March 16 to 22      The following orders were placed and all results were independently analyzed by me:  Orders Placed This Encounter   Procedures    Blood Culture - Blood,    Blood Culture - Blood,    Urine Culture - Urine, Urine, Clean Catch    CT Abdomen Pelvis Without Contrast    Comprehensive Metabolic Panel    Urinalysis With Microscopic If Indicated (No Culture) - Urine, Clean Catch    CBC Auto Differential    Urinalysis, Microscopic Only - Urine, Clean Catch    Lactic Acid, Plasma    CBC & Differential       Medications Given in the Emergency Department:  Medications   morphine injection 2 mg (2 mg Intravenous Given 3/25/24 2342)   ondansetron (ZOFRAN) injection 4 mg (4 mg Intravenous Given 3/25/24 2342)   cefTRIAXone (ROCEPHIN) 2000 mg/100 mL 0.9% NS IVPB (MBP) (2,000 mg Intravenous New Bag 3/26/24 0243)        ED  Course:    ED Course as of 03/26/24 0313   Tue Mar 26, 2024   0221 CT Abdomen Pelvis Without Contrast  Generalized adynamic ileus [DS]      ED Course User Index  [DS] Stefania Dougherty APRN       Labs:    Lab Results (last 24 hours)       Procedure Component Value Units Date/Time    CBC & Differential [[id number]]  (Abnormal) Collected: 03/25/24 2337    Specimen: Blood Updated: 03/25/24 2342    Narrative:      The following orders were created for panel order CBC & Differential.  Procedure                               Abnormality         Status                     ---------                               -----------         ------                     CBC Auto Differential[[id number]]        Abnormal            Final result                 Please view results for these tests on the individual orders.    Comprehensive Metabolic Panel [[id number]]  (Abnormal) Collected: 03/25/24 2337    Specimen: Blood Updated: 03/26/24 0008     Glucose 175 mg/dL      BUN 56 mg/dL      Creatinine 1.65 mg/dL      Sodium 137 mmol/L      Potassium 5.0 mmol/L      Comment: Slight hemolysis detected by analyzer. Result may be falsely elevated.        Chloride 99 mmol/L      CO2 25.4 mmol/L      Calcium 9.1 mg/dL      Total Protein 6.8 g/dL      Albumin 3.7 g/dL      ALT (SGPT) 16 U/L      AST (SGOT) 19 U/L      Comment: Slight hemolysis detected by analyzer. Result may be falsely elevated.        Alkaline Phosphatase 70 U/L      Total Bilirubin 0.3 mg/dL      Globulin 3.1 gm/dL      A/G Ratio 1.2 g/dL      BUN/Creatinine Ratio 33.9     Anion Gap 12.6 mmol/L      eGFR 33.3 mL/min/1.73     Narrative:      GFR Normal >60  Chronic Kidney Disease <60  Kidney Failure <15      Urinalysis With Microscopic If Indicated (No Culture) - Urine, Clean Catch [[id number]]  (Abnormal) Collected: 03/25/24 2337    Specimen: Urine, Clean Catch Updated: 03/25/24 2349     Color, UA Yellow     Appearance, UA Clear     pH, UA 6.0     Specific Elizabeth, UA 1.010      Glucose,  mg/dL (2+)     Ketones, UA Negative     Bilirubin, UA Negative     Blood, UA Negative     Protein, UA Negative     Leuk Esterase, UA Moderate (2+)     Nitrite, UA Negative     Urobilinogen, UA 0.2 E.U./dL    CBC Auto Differential [[id number]]  (Abnormal) Collected: 03/25/24 2337    Specimen: Blood Updated: 03/25/24 2342     WBC 13.93 10*3/mm3      RBC 3.93 10*6/mm3      Hemoglobin 11.7 g/dL      Hematocrit 35.8 %      MCV 91.1 fL      MCH 29.8 pg      MCHC 32.7 g/dL      RDW 13.3 %      RDW-SD 43.8 fl      MPV 10.2 fL      Platelets 211 10*3/mm3      Neutrophil % 76.7 %      Lymphocyte % 14.6 %      Monocyte % 6.2 %      Eosinophil % 1.4 %      Basophil % 0.1 %      Immature Grans % 1.0 %      Neutrophils, Absolute 10.69 10*3/mm3      Lymphocytes, Absolute 2.03 10*3/mm3      Monocytes, Absolute 0.86 10*3/mm3      Eosinophils, Absolute 0.19 10*3/mm3      Basophils, Absolute 0.02 10*3/mm3      Immature Grans, Absolute 0.14 10*3/mm3      nRBC 0.0 /100 WBC     Urinalysis, Microscopic Only - Urine, Clean Catch [[id number]]  (Abnormal) Collected: 03/25/24 2337    Specimen: Urine, Clean Catch Updated: 03/25/24 2349     RBC, UA 0-2 /HPF      WBC, UA 11-20 /HPF      Bacteria, UA 2+ /HPF      Squamous Epithelial Cells, UA 3-6 /HPF      Hyaline Casts, UA 3-6 /LPF      Methodology Automated Microscopy    Urine Culture - Urine, Urine, Clean Catch [[id number]] Collected: 03/25/24 2337    Specimen: Urine, Clean Catch Updated: 03/26/24 0225    Lactic Acid, Plasma [[id number]]  (Normal) Collected: 03/26/24 0243    Specimen: Blood Updated: 03/26/24 0303     Lactate 0.6 mmol/L     Blood Culture - Blood, Arm, Right [[id number]] Collected: 03/26/24 0243    Specimen: Blood from Arm, Right Updated: 03/26/24 0248    Blood Culture - Blood, Arm, Left [[id number]] Collected: 03/26/24 0243    Specimen: Blood from Arm, Left Updated: 03/26/24 0248             Imaging:    CT Abdomen Pelvis Without Contrast    Result Date: 3/26/2024  CT  ABDOMEN PELVIS W CONTRAST-  Date of Exam:  Indication: fall/ flank pain  Comparison: None available.  Technique: Axial CT images were obtained of the abdomen and pelvis following the uneventful intravenous administration of no intravenous contrast . Reconstructed coronal and sagittal images were also obtained. Automated exposure control and iterative construction methods were used.  Findings: No intravenous contrast was administered for the study as far as this  can discern from review of the submitted CT images. Please correlate with the performing CT technologist. There is extensive atherosclerotic change, including of the coronary arteries. Mild cardiomegaly is seen. There is a trace amount pericardial effusion. No pleural effusion is suggested. There may be mild atelectasis within the partially imaged lung bases. Air-trapping or pulmonary cysts may be seen in the lung bases. There is chronic calcified granulomatous disease of the chest and the abdomen. Probably no hepatosplenomegaly. No definite gallstones or acute cholecystitis. No acute pancreatitis. There is mild nodularity of the left adrenal gland. No definite right adrenal nodularity. No acute retroperitoneal or intraperitoneal hemorrhage. Extensive atherosclerotic changes involve the aortoiliac arterial system without aneurysmal dilatation. There may be generalized adynamic ileus. There are colonic diverticula without acute diverticulitis. A moderate to large stool burden is suggested. No acute appendicitis. No fecal impaction. No mechanical bowel obstruction is suggested. No pneumoperitoneum or pneumatosis. No portal mesenteric venous gas. There may be calcified uterine fibroids (or leiomyoma). No urinary bladder calculi are seen. Calcified injection granulomas are seen in the bilateral subcutaneous gluteal regions. Degenerative changes are seen throughout the imaged spine. There may be diffuse idiopathic skeletal hyperostosis (DISH).  Degenerative changes involve the hip joints and the bilateral sacroiliac (SI) joints. No acute fracture. No aggressive osseous lesion is suggested.      There may be a generalized adynamic ileus. Otherwise, no acute findings are seen by nonenhanced CT.    Electronically Signed By-Alfred Valdez MD On:3/26/2024 12:27 AM         Differential Diagnosis and Discussion:    Flank Pain: Differential diagnosis includes but is not limited to kidney stones, pyelonephritis, musculoskeletal disorders, renal infarction, urinary tract infection, hydronephrosis, radiculopathy, aortic aneurysm, renal cell carcinoma.    All labs were reviewed and interpreted by me.  CT scan radiology impression was interpreted by me.    MDM  Number of Diagnoses or Management Options  Contusion of flank, initial encounter  Urinary tract infection without hematuria, site unspecified  Diagnosis management comments: I have explained the patient´s condition, diagnoses and treatment plan based on the information available to me at this time. I have answered questions and addressed any concerns. The patient has a good  understanding of the patient´s diagnosis, condition, and treatment plan as can be expected at this point. The vital signs have been stable. The patient´s condition is stable and appropriate for discharge from the emergency department.      The patient will pursue further outpatient evaluation with the primary care physician or other designated or consulting physician as outlined in the discharge instructions. They are agreeable to this plan of care and follow-up instructions have been explained in detail. The patient has received these instructions in written format and have expressed an understanding of the discharge instructions. The patient is aware that any significant change in condition or worsening of symptoms should prompt an immediate return to this or the closest emergency department or call to 911.       Amount and/or Complexity  of Data Reviewed  Clinical lab tests: reviewed and ordered  Tests in the radiology section of CPT®: reviewed and ordered  Tests in the medicine section of CPT®: ordered and reviewed    Risk of Complications, Morbidity, and/or Mortality  Presenting problems: moderate  Diagnostic procedures: moderate  Management options: low    Patient Progress  Patient progress: stable           Patient Care Considerations:    SEPSIS was considered but is NOT present in the emergency department as SIRS criteria is not present.      Consultants/Shared Management Plan:    None    Social Determinants of Health:    Patient is independent, reliable, and has access to care.       Disposition and Care Coordination:    Discharged: I considered escalation of care by admitting this patient to the hospital, however CT did not show any intra-abdominal abnormality that would require continued care treatment    I have explained the patient´s condition, diagnoses and treatment plan based on the information available to me at this time. I have answered questions and addressed any concerns. The patient has a good  understanding of the patient´s diagnosis, condition, and treatment plan as can be expected at this point. The vital signs have been stable. The patient´s condition is stable and appropriate for discharge from the emergency department.      The patient will pursue further outpatient evaluation with the primary care physician or other designated or consulting physician as outlined in the discharge instructions. They are agreeable to this plan of care and follow-up instructions have been explained in detail. The patient has received these instructions in written format and has expressed an understanding of the discharge instructions. The patient is aware that any significant change in condition or worsening of symptoms should prompt an immediate return to this or the closest emergency department or call to 911.  I have explained discharge  medications and the need for follow up with the patient/caretakers. This was also printed in the discharge instructions. Patient was discharged with the following medications and follow up:      Medication List        New Prescriptions      cefdinir 300 MG capsule  Commonly known as: OMNICEF  Take 1 capsule by mouth 2 (Two) Times a Day for 7 days.     Diclofenac Sodium 1 % gel gel  Commonly known as: VOLTAREN  Apply 4 g topically to the appropriate area as directed 4 (Four) Times a Day As Needed (pain).     methocarbamol 500 MG tablet  Commonly known as: ROBAXIN  Take 1 tablet by mouth 3 (Three) Times a Day As Needed for Muscle Spasms.            Changed      dicyclomine 10 MG capsule  Commonly known as: BENTYL  Take 1 capsule by mouth 4 (Four) Times a Day Before Meals & at Bedtime As Needed for Abdominal Cramping.  What changed: additional instructions     * rOPINIRole 1 MG tablet  Commonly known as: REQUIP  Take 1 tablet by mouth 3 (Three) Times a Day. Take 1 hour before bedtime.  What changed: when to take this     * rOPINIRole 1 MG tablet  Commonly known as: REQUIP  What changed: Another medication with the same name was changed. Make sure you understand how and when to take each.     * rOPINIRole 1 MG tablet  Commonly known as: REQUIP  What changed: Another medication with the same name was changed. Make sure you understand how and when to take each.           * This list has 3 medication(s) that are the same as other medications prescribed for you. Read the directions carefully, and ask your doctor or other care provider to review them with you.                   Where to Get Your Medications        These medications were sent to [facility]/pharmacy #[id number] - [place], [place] - [address] - [phone] [facility] [phone]   [address] [place] [address]      Hours: 24-hours Phone: [phone]   cefdinir 300 MG capsule  Diclofenac Sodium 1 % gel gel  methocarbamol 500 MG tablet      Sharad  Angie, APRN  [address] [facility]  [address]  [place] [address]  [phone]    Schedule an appointment as soon as possible for a visit   As needed       Final diagnoses:   Urinary tract infection without hematuria, site unspecified   Contusion of flank, initial encounter        ED Disposition       ED Disposition   Discharge    Condition   Stable    Comment   --               This medical record created using voice recognition software.             Stefania Dougherty, APRN  03/26/24 0318

## 2024-03-26 NOTE — ED TRIAGE NOTES
Pt denies LOC, denies hitting head; denies neck pain; BENAVIDEZ 5/5 strength throughout; only complains of left lumbar pain; no bruising noted @ this time; reports burning pain and states initially needed help with family and EMS to ambulate s/p fall

## 2024-03-27 LAB — BACTERIA SPEC AEROBE CULT: NORMAL

## 2024-03-31 LAB
BACTERIA SPEC AEROBE CULT: NORMAL
BACTERIA SPEC AEROBE CULT: NORMAL

## 2024-04-19 DIAGNOSIS — K58.0 IRRITABLE BOWEL SYNDROME WITH DIARRHEA: ICD-10-CM

## 2024-04-19 RX ORDER — DICYCLOMINE HYDROCHLORIDE 10 MG/1
10 CAPSULE ORAL
Qty: 360 CAPSULE | Refills: 1 | Status: SHIPPED | OUTPATIENT
Start: 2024-04-19

## 2024-04-28 ENCOUNTER — APPOINTMENT (OUTPATIENT)
Dept: GENERAL RADIOLOGY | Facility: HOSPITAL | Age: 69
End: 2024-04-28
Payer: MEDICARE

## 2024-04-28 ENCOUNTER — HOSPITAL ENCOUNTER (EMERGENCY)
Facility: HOSPITAL | Age: 69
Discharge: HOME | End: 2024-04-28
Attending: EMERGENCY MEDICINE | Admitting: EMERGENCY MEDICINE
Payer: MEDICARE

## 2024-04-28 VITALS
HEIGHT: 63 IN | SYSTOLIC BLOOD PRESSURE: 145 MMHG | OXYGEN SATURATION: 99 % | HEART RATE: 79 BPM | TEMPERATURE: 98.7 F | RESPIRATION RATE: 20 BRPM | BODY MASS INDEX: 33.36 KG/M2 | DIASTOLIC BLOOD PRESSURE: 57 MMHG | WEIGHT: 188.27 LBS

## 2024-04-28 DIAGNOSIS — J06.9 VIRAL UPPER RESPIRATORY ILLNESS: Primary | ICD-10-CM

## 2024-04-28 LAB
ALBUMIN SERPL-MCNC: 3.9 G/DL (ref 3.5–5.2)
ALBUMIN/GLOB SERPL: 1.6 G/DL
ALP SERPL-CCNC: 63 U/L (ref 39–117)
ALT SERPL W P-5'-P-CCNC: 9 U/L (ref 1–33)
ANION GAP SERPL CALCULATED.3IONS-SCNC: 12.1 MMOL/L (ref 5–15)
AST SERPL-CCNC: 16 U/L (ref 1–32)
BASOPHILS # BLD AUTO: 0.02 10*3/MM3 (ref 0–0.2)
BASOPHILS NFR BLD AUTO: 0.3 % (ref 0–1.5)
BILIRUB SERPL-MCNC: 0.3 MG/DL (ref 0–1.2)
BUN SERPL-MCNC: 30 MG/DL (ref 8–23)
BUN/CREAT SERPL: 19.4 (ref 7–25)
CALCIUM SPEC-SCNC: 9.2 MG/DL (ref 8.6–10.5)
CHLORIDE SERPL-SCNC: 102 MMOL/L (ref 98–107)
CO2 SERPL-SCNC: 24.9 MMOL/L (ref 22–29)
CREAT SERPL-MCNC: 1.55 MG/DL (ref 0.57–1)
DEPRECATED RDW RBC AUTO: 45.4 FL (ref 37–54)
EGFRCR SERPLBLD CKD-EPI 2021: 35.9 ML/MIN/1.73
EOSINOPHIL # BLD AUTO: 0.08 10*3/MM3 (ref 0–0.4)
EOSINOPHIL NFR BLD AUTO: 1.2 % (ref 0.3–6.2)
ERYTHROCYTE [DISTWIDTH] IN BLOOD BY AUTOMATED COUNT: 13.6 % (ref 12.3–15.4)
FLUAV SUBTYP SPEC NAA+PROBE: NOT DETECTED
FLUBV RNA ISLT QL NAA+PROBE: NOT DETECTED
GLOBULIN UR ELPH-MCNC: 2.5 GM/DL
GLUCOSE SERPL-MCNC: 108 MG/DL (ref 65–99)
HCT VFR BLD AUTO: 35.1 % (ref 34–46.6)
HGB BLD-MCNC: 11.2 G/DL (ref 12–15.9)
HOLD SPECIMEN: NORMAL
HOLD SPECIMEN: NORMAL
IMM GRANULOCYTES # BLD AUTO: 0.02 10*3/MM3 (ref 0–0.05)
IMM GRANULOCYTES NFR BLD AUTO: 0.3 % (ref 0–0.5)
LYMPHOCYTES # BLD AUTO: 1.45 10*3/MM3 (ref 0.7–3.1)
LYMPHOCYTES NFR BLD AUTO: 22.1 % (ref 19.6–45.3)
MCH RBC QN AUTO: 29.5 PG (ref 26.6–33)
MCHC RBC AUTO-ENTMCNC: 31.9 G/DL (ref 31.5–35.7)
MCV RBC AUTO: 92.4 FL (ref 79–97)
MONOCYTES # BLD AUTO: 0.42 10*3/MM3 (ref 0.1–0.9)
MONOCYTES NFR BLD AUTO: 6.4 % (ref 5–12)
NEUTROPHILS NFR BLD AUTO: 4.56 10*3/MM3 (ref 1.7–7)
NEUTROPHILS NFR BLD AUTO: 69.7 % (ref 42.7–76)
NRBC BLD AUTO-RTO: 0 /100 WBC (ref 0–0.2)
NT-PROBNP SERPL-MCNC: 6534 PG/ML (ref 0–900)
PLATELET # BLD AUTO: 151 10*3/MM3 (ref 140–450)
PMV BLD AUTO: 10.3 FL (ref 6–12)
POTASSIUM SERPL-SCNC: 4.4 MMOL/L (ref 3.5–5.2)
PROT SERPL-MCNC: 6.4 G/DL (ref 6–8.5)
RBC # BLD AUTO: 3.8 10*6/MM3 (ref 3.77–5.28)
RSV RNA NPH QL NAA+NON-PROBE: NOT DETECTED
SARS-COV-2 RNA RESP QL NAA+PROBE: NOT DETECTED
SODIUM SERPL-SCNC: 139 MMOL/L (ref 136–145)
TROPONIN T SERPL HS-MCNC: 71 NG/L
WBC NRBC COR # BLD AUTO: 6.55 10*3/MM3 (ref 3.4–10.8)
WHOLE BLOOD HOLD COAG: NORMAL
WHOLE BLOOD HOLD SPECIMEN: NORMAL

## 2024-04-28 PROCEDURE — 93005 ELECTROCARDIOGRAM TRACING: CPT | Performed by: EMERGENCY MEDICINE

## 2024-04-28 PROCEDURE — 87637 SARSCOV2&INF A&B&RSV AMP PRB: CPT | Performed by: EMERGENCY MEDICINE

## 2024-04-28 PROCEDURE — 99284 EMERGENCY DEPT VISIT MOD MDM: CPT

## 2024-04-28 PROCEDURE — 83880 ASSAY OF NATRIURETIC PEPTIDE: CPT | Performed by: EMERGENCY MEDICINE

## 2024-04-28 PROCEDURE — 93005 ELECTROCARDIOGRAM TRACING: CPT

## 2024-04-28 PROCEDURE — 85025 COMPLETE CBC W/AUTO DIFF WBC: CPT | Performed by: EMERGENCY MEDICINE

## 2024-04-28 PROCEDURE — 71045 X-RAY EXAM CHEST 1 VIEW: CPT

## 2024-04-28 PROCEDURE — 84484 ASSAY OF TROPONIN QUANT: CPT | Performed by: EMERGENCY MEDICINE

## 2024-04-28 PROCEDURE — 80053 COMPREHEN METABOLIC PANEL: CPT | Performed by: EMERGENCY MEDICINE

## 2024-04-28 RX ORDER — SODIUM CHLORIDE 0.9 % (FLUSH) 0.9 %
10 SYRINGE (ML) INJECTION AS NEEDED
Status: DISCONTINUED | OUTPATIENT
Start: 2024-04-28 | End: 2024-04-28 | Stop reason: HOSPADM

## 2024-04-28 NOTE — ED PROVIDER NOTES
I would advise that we increase her levothyroxine to 88  g daily. I have sent a new prescription to the pharmacy. Follow up in 2 months to reassess her symptoms and recheck her thyroid function tests.   Time: 1:25 PM EDT  Date of encounter:  4/28/2024  Independent Historian/Clinical History and Information was obtained by:   Patient    History is limited by: N/A    Chief Complaint: Cough congestion      History of Present Illness:  Patient is a 68 y.o. year old female who presents to the emergency department for evaluation of cough and congestion.  Patient has a history of diabetes, CHF, coronary disease, hypertension who presents with cough and congestion.  Reports that she is not felt well for about a week to a week and a half.  Has had cough and congestion.  States that she has been coughing up some sputum.  Denies any significant weight gain.  Has been compliant with her medication.  No other complaints this time.    HPI    Patient Care Team  Primary Care Provider: Parker Fontana MD    Past Medical History:     Allergies   Allergen Reactions    Clopidogrel Other (See Comments)     Nosebleed    Entresto [Sacubitril-Valsartan] Other (See Comments)     hypotension     Past Medical History:   Diagnosis Date    CAD 10/18/2023    CHF (congestive heart failure)     Diabetes mellitus     Fluid retention     Gout     Hypertension     Stroke     Tachycardia      Past Surgical History:   Procedure Laterality Date    TONSILLECTOMY AND ADENOIDECTOMY       History reviewed. No pertinent family history.    Home Medications:  Prior to Admission medications    Medication Sig Start Date End Date Taking? Authorizing Provider   acyclovir (ZOVIRAX) 400 MG tablet Take 1 tablet by mouth 3 (Three) Times a Day As Needed (only with outbreaks).    Provider, MD Isamar   albuterol sulfate  (90 Base) MCG/ACT inhaler Inhale 2 puffs Every 4 (Four) Hours As Needed for Wheezing. 3/22/24   Donald Clark MD   allopurinol (ZYLOPRIM) 300 MG tablet Take 1 tablet by mouth Daily. 12/15/23   Angie Orourke APRN   Coenzyme Q10 200 MG capsule Take 200 mg by mouth Daily.    ProviderIsamar MD   Diclofenac Sodium (VOLTAREN)  1 % gel gel Apply 4 g topically to the appropriate area as directed 4 (Four) Times a Day As Needed (pain). 3/26/24   Stefania Dougherty APRN   dicyclomine (BENTYL) 10 MG capsule TAKE 1 CAPSULE BY MOUTH 4 (FOUR) TIMES A DAY BEFORE MEALS & AT BEDTIME AS NEEDED FOR ABDOMINAL CRAMPING. 4/19/24   Parker Fontana MD   empagliflozin (JARDIANCE) 10 MG tablet tablet Take 1 tablet by mouth Daily. 3/23/24   Donald Clark MD   fluticasone-salmeterol (Advair HFA) 115-21 MCG/ACT inhaler Inhale 2 puffs 2 (Two) Times a Day. 3/22/24   Donald Clark MD   furosemide (LASIX) 20 MG tablet Take 3 tablets by mouth Daily for 30 days. 3/23/24 4/22/24  Donald Clark MD   methocarbamol (ROBAXIN) 500 MG tablet Take 1 tablet by mouth 3 (Three) Times a Day As Needed for Muscle Spasms. 3/26/24   Stefania Dougherty APRN   metoprolol succinate XL (TOPROL-XL) 25 MG 24 hr tablet Take 1/2 tablet by mouth Every 12 (Twelve) Hours for 30 days. 3/22/24 4/21/24  Donald Clark MD   Mirabegron ER (MYRBETRIQ) 25 MG tablet sustained-release 24 hour 24 hr tablet Take 1 tablet by mouth Daily. 12/15/23   Angie Orourke APRN   Misc. Devices (BMI Digital Smart Scale) misc Use 1 each Daily. 3/13/24   Latrice Allen APRN   rOPINIRole (REQUIP) 1 MG tablet Take 1 tablet by mouth 3 (Three) Times a Day. Take 1 hour before bedtime.  Patient taking differently: Take 1 tablet by mouth Every Morning. Take 1 hour before bedtime. 11/1/23   Parker Fontana MD   rOPINIRole (REQUIP) 1 MG tablet Take 1 tablet by mouth Daily With Lunch. Take 1 hour before bedtime.    Provider, MD Isamar   rOPINIRole (REQUIP) 1 MG tablet Take 2 tablets by mouth Every Night. Take 1 hour before bedtime.    Provider, MD Isamar   traZODone (DESYREL) 100 MG tablet Take 1 tablet by mouth Every Night. 12/15/23   Angie Orourke APRN        Social History:   Social History     Tobacco Use    Smoking status: Former     Current packs/day: 0.00     Types: Cigarettes  "    Quit date:      Years since quittin.3    Smokeless tobacco: Never   Vaping Use    Vaping status: Some Days    Substances: Flavoring   Substance Use Topics    Alcohol use: Not Currently    Drug use: Never         Review of Systems:  Review of Systems   HENT:  Positive for congestion.    Respiratory:  Positive for cough.         Physical Exam:  /65   Pulse 75   Temp 98.7 °F (37.1 °C) (Oral)   Resp 20   Ht 160 cm (63\")   Wt 85.4 kg (188 lb 4.4 oz)   SpO2 95%   BMI 33.35 kg/m²     Physical Exam  Vitals and nursing note reviewed.   Constitutional:       Appearance: Normal appearance.   HENT:      Head: Normocephalic and atraumatic.   Eyes:      General: No scleral icterus.  Cardiovascular:      Rate and Rhythm: Normal rate and regular rhythm.      Heart sounds: Normal heart sounds.   Pulmonary:      Effort: Pulmonary effort is normal.      Breath sounds: Normal breath sounds.   Abdominal:      Palpations: Abdomen is soft.      Tenderness: There is no abdominal tenderness.   Musculoskeletal:         General: Normal range of motion.      Cervical back: Normal range of motion.   Skin:     Findings: No rash.   Neurological:      General: No focal deficit present.      Mental Status: She is alert.                  Procedures:  Procedures      Medical Decision Making:      Comorbidities that affect care:    Congestive Heart Failure, Coronary Artery Disease, Diabetes, Hypertension    External Notes reviewed:    Reviewed admission from 3/16/2024      The following orders were placed and all results were independently analyzed by me:  Orders Placed This Encounter   Procedures    COVID-19, FLU A/B, RSV PCR 1 HR TAT - Swab, Nasopharynx    XR Chest 1 View    [place] Draw    Comprehensive Metabolic Panel    BNP    Single High Sensitivity Troponin T    CBC Auto Differential    High Sensitivity Troponin T 2Hr    NPO Diet NPO Type: Strict NPO    Undress & Gown    Continuous Pulse Oximetry    Vital Signs    " Oxygen Therapy- Nasal Cannula; Titrate 1-6 LPM Per SpO2; 90 - 95%    ECG 12 Lead ED Triage Standing Order; SOA    Insert Peripheral IV    CBC & Differential    Green Top (Gel)    Lavender Top    Gold Top - SST    Light Blue Top       Medications Given in the Emergency Department:  Medications   sodium chloride 0.9 % flush 10 mL (has no administration in time range)        ED Course:    ED Course as of 04/28/24 1458   Sun Apr 28, 2024   1326 EKG interpreted by me  Time: 1316  Heart rate 78  Sinus, left bundle branch block, no acute ischemia [MA]      ED Course User Index  [MA] Adam Lomax MD       Labs:    Lab Results (last 24 hours)       Procedure Component Value Units Date/Time    CBC & Differential [[id number]]  (Abnormal) Collected: 04/28/24 1323    Specimen: Blood Updated: 04/28/24 1331    Narrative:      The following orders were created for panel order CBC & Differential.  Procedure                               Abnormality         Status                     ---------                               -----------         ------                     CBC Auto Differential[[id number]]        Abnormal            Final result                 Please view results for these tests on the individual orders.    Comprehensive Metabolic Panel [[id number]]  (Abnormal) Collected: 04/28/24 1323    Specimen: Blood Updated: 04/28/24 1348     Glucose 108 mg/dL      BUN 30 mg/dL      Creatinine 1.55 mg/dL      Sodium 139 mmol/L      Potassium 4.4 mmol/L      Chloride 102 mmol/L      CO2 24.9 mmol/L      Calcium 9.2 mg/dL      Total Protein 6.4 g/dL      Albumin 3.9 g/dL      ALT (SGPT) 9 U/L      AST (SGOT) 16 U/L      Alkaline Phosphatase 63 U/L      Total Bilirubin 0.3 mg/dL      Globulin 2.5 gm/dL      A/G Ratio 1.6 g/dL      BUN/Creatinine Ratio 19.4     Anion Gap 12.1 mmol/L      eGFR 35.9 mL/min/1.73     Narrative:      GFR Normal >60  Chronic Kidney Disease <60  Kidney Failure <15      BNP [[id number]]  (Abnormal)  Collected: 04/28/24 1323    Specimen: Blood Updated: 04/28/24 1351     proBNP 6,534.0 pg/mL     Narrative:      This assay is used as an aid in the diagnosis of individuals suspected of having heart failure. It can be used as an aid in the diagnosis of acute decompensated heart failure (ADHF) in patients presenting with signs and symptoms of ADHF to the emergency department (ED). In addition, NT-proBNP of <300 pg/mL indicates ADHF is not likely.    Age Range Result Interpretation  NT-proBNP Concentration (pg/mL:      <50             Positive            >450                   Gray                 300-450                    Negative             <300    50-75           Positive            >900                  Gray                300-900                  Negative            <300      >75             Positive            >1800                  Gray                300-1800                  Negative            <300    Single High Sensitivity Troponin T [[id number]]  (Abnormal) Collected: 04/28/24 1323    Specimen: Blood Updated: 04/28/24 1417     HS Troponin T 71 ng/L     Narrative:      High Sensitive Troponin T Reference Range:  <14.0 ng/L- Negative Female for AMI  <22.0 ng/L- Negative Male for AMI  >=14 - Abnormal Female indicating possible myocardial injury.  >=22 - Abnormal Male indicating possible myocardial injury.   Clinicians would have to utilize clinical acumen, EKG, Troponin, and serial changes to determine if it is an Acute Myocardial Infarction or myocardial injury due to an underlying chronic condition.         CBC Auto Differential [[id number]]  (Abnormal) Collected: 04/28/24 1323    Specimen: Blood Updated: 04/28/24 1331     WBC 6.55 10*3/mm3      RBC 3.80 10*6/mm3      Hemoglobin 11.2 g/dL      Hematocrit 35.1 %      MCV 92.4 fL      MCH 29.5 pg      MCHC 31.9 g/dL      RDW 13.6 %      RDW-SD 45.4 fl      MPV 10.3 fL      Platelets 151 10*3/mm3      Neutrophil % 69.7 %      Lymphocyte % 22.1 %      Monocyte  % 6.4 %      Eosinophil % 1.2 %      Basophil % 0.3 %      Immature Grans % 0.3 %      Neutrophils, Absolute 4.56 10*3/mm3      Lymphocytes, Absolute 1.45 10*3/mm3      Monocytes, Absolute 0.42 10*3/mm3      Eosinophils, Absolute 0.08 10*3/mm3      Basophils, Absolute 0.02 10*3/mm3      Immature Grans, Absolute 0.02 10*3/mm3      nRBC 0.0 /100 WBC     COVID-19, FLU A/B, RSV PCR 1 HR TAT - Swab, Nasopharynx [[id number]]  (Normal) Collected: 04/28/24 1329    Specimen: Swab from Nasopharynx Updated: 04/28/24 1424     COVID19 Not Detected     Influenza A PCR Not Detected     Influenza B PCR Not Detected     RSV, PCR Not Detected    Narrative:      Fact sheet for providers: https://www.fda.gov/media/167527/download    Fact sheet for patients: https://www.[place].gov/media/296569/download    Test performed by PCR.             Imaging:    XR Chest 1 View    Result Date: 4/28/2024  XR CHEST 1 VW-  Date of Exam: 4/28/2024 1:30 PM  Indication: SOA Triage Protocol  Comparison: Chest radiograph 3/16/2024  Findings: Radiographic resolution of previously noted right lower lobe opacities. Improving mild residual platelike left lower lobe opacities favoring atelectasis versus scar, versus possible mild residual infection. Less conspicuous blunting of costophrenic angles suggesting either pleural thickening or trace residual pleural fluid. No worsening airspace disease, edema, large effusion or pneumothorax. Heart size normal. Aortic atherosclerotic disease. Degenerative related osseous changes.      Impression: Radiographically improved previously noted infectious airspace disease and effusions since 3/16/2024.  There may be mild residual airspace disease versus atelectasis versus scar in the left lower lobe as well as possible residual trace pleural fluid.   Electronically Signed By-Urbano Marshall MD On:4/28/2024 1:41 PM         Differential Diagnosis and Discussion:    Cough: Differential diagnosis includes but is not limited to  pneumonia, acute bronchitis, upper respiratory infection, ACE inhibitor use, allergic reaction, epiglottitis, seasonal allergies, chemical irritants, exercise-induced asthma, viral syndrome.    All labs were reviewed and interpreted by me.  All X-rays impressions were independently interpreted by me.  EKG was interpreted by me.    MDM     Amount and/or Complexity of Data Reviewed  Clinical lab tests: reviewed  Tests in the radiology section of CPT®: reviewed  Tests in the medicine section of CPT®: reviewed  Decide to obtain previous medical records or to obtain history from someone other than the patient: yes       Patient is a 68-year-old female with multiple medical problems who presents with complaints of cough and congestion.  Based on her history this appears to be more viral URI.  She is negative for COVID and flu as her labs are improved from her previous visits.  Does have a history of CHF but she does not appear to have acute CHF at this time.  X-ray shows improvement from previous visits.  Her labs are also improved from previous visits.  At this time okay for outpatient follow-up.  Continue supportive care at home.  Will DC.          Patient Care Considerations:          Consultants/Shared Management Plan:    None    Social Determinants of Health:    Patient is independent, reliable, and has access to care.       Disposition and Care Coordination:    Discharged: The patient is suitable and stable for discharge with no need for consideration of admission.    I have explained the patient´s condition, diagnoses and treatment plan based on the information available to me at this time. I have answered questions and addressed any concerns. The patient has a good  understanding of the patient´s diagnosis, condition, and treatment plan as can be expected at this point. The vital signs have been stable. The patient´s condition is stable and appropriate for discharge from the emergency department.      The patient  will pursue further outpatient evaluation with the primary care physician or other designated or consulting physician as outlined in the discharge instructions. They are agreeable to this plan of care and follow-up instructions have been explained in detail. The patient has received these instructions in written format and have expressed an understanding of the discharge instructions. The patient is aware that any significant change in condition or worsening of symptoms should prompt an immediate return to this or the closest emergency department or call to 911.      Final diagnoses:   Viral upper respiratory illness        ED Disposition       ED Disposition   Discharge    Condition   Stable    Comment   --               This medical record created using voice recognition software.             Adam Lomax MD  04/28/24 [id number]

## 2024-04-28 NOTE — DISCHARGE INSTRUCTIONS
Call your family physician to report symptoms/Emergency Room evaluation and to arrange close follow-up. YOU MUST CALL OFFICE SOONER IF THERE ARE ANY CHANGES OR WORSENING SYMPTOMS.    You may need a recheck in the Emergency Department for any new and/or worsening symptoms, including but not limited to: Chest pain, Shortness of breath, Fever/chills, swelling, abdominal pain, blood in your stool/urine/vomit.     Drink plenty of clear liquids/water    Take all meds as directed.  Please ask your pharmacist to review all of your medications.  Some medications can be changed based on cost.  Please discuss cost options with your pharmacist.  They can call the ED for needed changes if the ER prescribed the medications..

## 2024-04-30 LAB
QT INTERVAL: 430 MS
QTC INTERVAL: 489 MS

## 2024-05-08 RX ORDER — TRAZODONE HYDROCHLORIDE 100 MG/1
100 TABLET ORAL
Qty: 90 TABLET | Refills: 1 | OUTPATIENT
Start: 2024-05-08

## 2024-05-29 RX ORDER — TRAZODONE HYDROCHLORIDE 100 MG/1
100 TABLET ORAL
Qty: 90 TABLET | Refills: 1 | Status: SHIPPED | OUTPATIENT
Start: 2024-05-29